# Patient Record
Sex: FEMALE | Race: WHITE | HISPANIC OR LATINO | Employment: FULL TIME | URBAN - METROPOLITAN AREA
[De-identification: names, ages, dates, MRNs, and addresses within clinical notes are randomized per-mention and may not be internally consistent; named-entity substitution may affect disease eponyms.]

---

## 2017-02-05 ENCOUNTER — APPOINTMENT (OUTPATIENT)
Dept: URGENT CARE | Age: 27
End: 2017-02-05
Payer: OTHER MISCELLANEOUS

## 2017-02-05 ENCOUNTER — TRANSCRIBE ORDERS (OUTPATIENT)
Dept: URGENT CARE | Age: 27
End: 2017-02-05

## 2017-02-05 ENCOUNTER — HOSPITAL ENCOUNTER (OUTPATIENT)
Dept: RADIOLOGY | Age: 27
Discharge: HOME | End: 2017-02-05
Attending: PHYSICIAN ASSISTANT
Payer: OTHER MISCELLANEOUS

## 2017-02-05 DIAGNOSIS — T14.90XA INJURY: ICD-10-CM

## 2017-02-05 DIAGNOSIS — T14.90XA INJURY: Primary | ICD-10-CM

## 2017-02-05 PROCEDURE — G0382 LEV 3 HOSP TYPE B ED VISIT: HCPCS

## 2017-02-05 PROCEDURE — 99283 EMERGENCY DEPT VISIT LOW MDM: CPT

## 2017-02-05 PROCEDURE — 73630 X-RAY EXAM OF FOOT: CPT

## 2017-02-08 ENCOUNTER — APPOINTMENT (OUTPATIENT)
Dept: URGENT CARE | Age: 27
End: 2017-02-08
Payer: OTHER MISCELLANEOUS

## 2017-02-08 PROCEDURE — 99213 OFFICE O/P EST LOW 20 MIN: CPT

## 2017-04-26 ENCOUNTER — APPOINTMENT (EMERGENCY)
Dept: CT IMAGING | Facility: HOSPITAL | Age: 27
End: 2017-04-26
Payer: COMMERCIAL

## 2017-04-26 ENCOUNTER — APPOINTMENT (EMERGENCY)
Dept: ULTRASOUND IMAGING | Facility: HOSPITAL | Age: 27
End: 2017-04-26
Payer: COMMERCIAL

## 2017-04-26 ENCOUNTER — HOSPITAL ENCOUNTER (EMERGENCY)
Facility: HOSPITAL | Age: 27
Discharge: HOME | End: 2017-04-26
Attending: EMERGENCY MEDICINE | Admitting: EMERGENCY MEDICINE
Payer: COMMERCIAL

## 2017-04-26 VITALS
RESPIRATION RATE: 20 BRPM | DIASTOLIC BLOOD PRESSURE: 65 MMHG | TEMPERATURE: 98.8 F | WEIGHT: 190 LBS | OXYGEN SATURATION: 99 % | HEART RATE: 77 BPM | SYSTOLIC BLOOD PRESSURE: 128 MMHG

## 2017-04-26 DIAGNOSIS — N73.0 PID (ACUTE PELVIC INFLAMMATORY DISEASE): ICD-10-CM

## 2017-04-26 DIAGNOSIS — R10.2 PELVIC PAIN: Primary | ICD-10-CM

## 2017-04-26 LAB
ALBUMIN SERPL BCP-MCNC: 3.7 G/DL (ref 3.5–5)
ALP SERPL-CCNC: 44 U/L (ref 46–116)
ALT SERPL W P-5'-P-CCNC: 16 U/L (ref 12–78)
ANION GAP SERPL CALCULATED.3IONS-SCNC: 8 MMOL/L (ref 4–13)
AST SERPL W P-5'-P-CCNC: 13 U/L (ref 5–45)
BACTERIA UR QL AUTO: ABNORMAL /HPF
BASOPHILS # BLD AUTO: 0.03 THOUSANDS/ΜL (ref 0–0.1)
BASOPHILS NFR BLD AUTO: 0 % (ref 0–1)
BILIRUB SERPL-MCNC: 0.38 MG/DL (ref 0.2–1)
BILIRUB UR QL STRIP: ABNORMAL
BUN SERPL-MCNC: 8 MG/DL (ref 5–25)
CALCIUM SERPL-MCNC: 8.5 MG/DL (ref 8.3–10.1)
CHLAMYDIA DNA CVX QL NAA+PROBE: NORMAL
CHLORIDE SERPL-SCNC: 105 MMOL/L (ref 100–108)
CLARITY UR: CLEAR
CLARITY, POC: CLEAR
CO2 SERPL-SCNC: 27 MMOL/L (ref 21–32)
COLOR UR: ABNORMAL
COLOR, POC: NORMAL
CREAT SERPL-MCNC: 0.67 MG/DL (ref 0.6–1.3)
EOSINOPHIL # BLD AUTO: 0.39 THOUSAND/ΜL (ref 0–0.61)
EOSINOPHIL NFR BLD AUTO: 4 % (ref 0–6)
ERYTHROCYTE [DISTWIDTH] IN BLOOD BY AUTOMATED COUNT: 17 % (ref 11.6–15.1)
GFR SERPL CREATININE-BSD FRML MDRD: >60 ML/MIN/1.73SQ M
GLUCOSE SERPL-MCNC: 113 MG/DL (ref 65–140)
GLUCOSE UR STRIP-MCNC: NEGATIVE MG/DL
HCG SERPL QL: NEGATIVE
HCG UR QL: NEGATIVE
HCT VFR BLD AUTO: 37.7 % (ref 34.8–46.1)
HGB BLD-MCNC: 12 G/DL (ref 11.5–15.4)
HGB UR QL STRIP.AUTO: ABNORMAL
KETONES UR STRIP-MCNC: NEGATIVE MG/DL
LACTATE SERPL-SCNC: 1.2 MMOL/L (ref 0.5–2)
LEUKOCYTE ESTERASE UR QL STRIP: NEGATIVE
LIPASE SERPL-CCNC: 170 U/L (ref 73–393)
LYMPHOCYTES # BLD AUTO: 3.67 THOUSANDS/ΜL (ref 0.6–4.47)
LYMPHOCYTES NFR BLD AUTO: 33 % (ref 14–44)
MCH RBC QN AUTO: 22.7 PG (ref 26.8–34.3)
MCHC RBC AUTO-ENTMCNC: 31.8 G/DL (ref 31.4–37.4)
MCV RBC AUTO: 71 FL (ref 82–98)
MONOCYTES # BLD AUTO: 0.77 THOUSAND/ΜL (ref 0.17–1.22)
MONOCYTES NFR BLD AUTO: 7 % (ref 4–12)
MUCOUS THREADS UR QL AUTO: ABNORMAL
N GONORRHOEA DNA GENITAL QL NAA+PROBE: NORMAL
NEUTROPHILS # BLD AUTO: 6.13 THOUSANDS/ΜL (ref 1.85–7.62)
NEUTS SEG NFR BLD AUTO: 56 % (ref 43–75)
NITRITE UR QL STRIP: NEGATIVE
NON-SQ EPI CELLS URNS QL MICRO: ABNORMAL /HPF
NRBC BLD AUTO-RTO: 0 /100 WBCS
PH UR STRIP.AUTO: 5.5 [PH] (ref 4.5–8)
PLATELET # BLD AUTO: 262 THOUSANDS/UL (ref 149–390)
PMV BLD AUTO: 11 FL (ref 8.9–12.7)
POTASSIUM SERPL-SCNC: 3.2 MMOL/L (ref 3.5–5.3)
PROT SERPL-MCNC: 7.2 G/DL (ref 6.4–8.2)
PROT UR STRIP-MCNC: ABNORMAL MG/DL
RBC # BLD AUTO: 5.29 MILLION/UL (ref 3.81–5.12)
RBC #/AREA URNS AUTO: ABNORMAL /HPF
SODIUM SERPL-SCNC: 140 MMOL/L (ref 136–145)
SP GR UR STRIP.AUTO: <=1.005 (ref 1–1.03)
UROBILINOGEN UR QL STRIP.AUTO: 0.2 E.U./DL
WBC # BLD AUTO: 10.99 THOUSAND/UL (ref 4.31–10.16)
WBC #/AREA URNS AUTO: ABNORMAL /HPF

## 2017-04-26 PROCEDURE — 87491 CHLMYD TRACH DNA AMP PROBE: CPT | Performed by: EMERGENCY MEDICINE

## 2017-04-26 PROCEDURE — 36415 COLL VENOUS BLD VENIPUNCTURE: CPT | Performed by: EMERGENCY MEDICINE

## 2017-04-26 PROCEDURE — 85025 COMPLETE CBC W/AUTO DIFF WBC: CPT | Performed by: EMERGENCY MEDICINE

## 2017-04-26 PROCEDURE — 96361 HYDRATE IV INFUSION ADD-ON: CPT

## 2017-04-26 PROCEDURE — 83690 ASSAY OF LIPASE: CPT | Performed by: EMERGENCY MEDICINE

## 2017-04-26 PROCEDURE — 83605 ASSAY OF LACTIC ACID: CPT | Performed by: EMERGENCY MEDICINE

## 2017-04-26 PROCEDURE — 87591 N.GONORRHOEAE DNA AMP PROB: CPT | Performed by: EMERGENCY MEDICINE

## 2017-04-26 PROCEDURE — 87147 CULTURE TYPE IMMUNOLOGIC: CPT

## 2017-04-26 PROCEDURE — 96375 TX/PRO/DX INJ NEW DRUG ADDON: CPT

## 2017-04-26 PROCEDURE — 80053 COMPREHEN METABOLIC PANEL: CPT | Performed by: EMERGENCY MEDICINE

## 2017-04-26 PROCEDURE — 81025 URINE PREGNANCY TEST: CPT | Performed by: EMERGENCY MEDICINE

## 2017-04-26 PROCEDURE — 81002 URINALYSIS NONAUTO W/O SCOPE: CPT | Performed by: EMERGENCY MEDICINE

## 2017-04-26 PROCEDURE — 93976 VASCULAR STUDY: CPT

## 2017-04-26 PROCEDURE — 87086 URINE CULTURE/COLONY COUNT: CPT

## 2017-04-26 PROCEDURE — 74177 CT ABD & PELVIS W/CONTRAST: CPT

## 2017-04-26 PROCEDURE — 76830 TRANSVAGINAL US NON-OB: CPT

## 2017-04-26 PROCEDURE — 96374 THER/PROPH/DIAG INJ IV PUSH: CPT

## 2017-04-26 PROCEDURE — 84703 CHORIONIC GONADOTROPIN ASSAY: CPT | Performed by: EMERGENCY MEDICINE

## 2017-04-26 PROCEDURE — 81001 URINALYSIS AUTO W/SCOPE: CPT

## 2017-04-26 PROCEDURE — 96372 THER/PROPH/DIAG INJ SC/IM: CPT

## 2017-04-26 PROCEDURE — 76856 US EXAM PELVIC COMPLETE: CPT

## 2017-04-26 PROCEDURE — 99284 EMERGENCY DEPT VISIT MOD MDM: CPT

## 2017-04-26 RX ORDER — KETOROLAC TROMETHAMINE 30 MG/ML
30 INJECTION, SOLUTION INTRAMUSCULAR; INTRAVENOUS ONCE
Status: COMPLETED | OUTPATIENT
Start: 2017-04-26 | End: 2017-04-26

## 2017-04-26 RX ORDER — DOXYCYCLINE HYCLATE 100 MG/1
100 CAPSULE ORAL ONCE
Status: COMPLETED | OUTPATIENT
Start: 2017-04-26 | End: 2017-04-26

## 2017-04-26 RX ORDER — CEFTRIAXONE SODIUM 250 MG/1
250 INJECTION, POWDER, FOR SOLUTION INTRAMUSCULAR; INTRAVENOUS ONCE
Status: COMPLETED | OUTPATIENT
Start: 2017-04-26 | End: 2017-04-26

## 2017-04-26 RX ORDER — MORPHINE SULFATE 4 MG/ML
4 INJECTION, SOLUTION INTRAMUSCULAR; INTRAVENOUS ONCE
Status: COMPLETED | OUTPATIENT
Start: 2017-04-26 | End: 2017-04-26

## 2017-04-26 RX ORDER — LIDOCAINE HYDROCHLORIDE 10 MG/ML
0.9 INJECTION, SOLUTION EPIDURAL; INFILTRATION; INTRACAUDAL; PERINEURAL ONCE
Status: COMPLETED | OUTPATIENT
Start: 2017-04-26 | End: 2017-04-26

## 2017-04-26 RX ORDER — TRAMADOL HYDROCHLORIDE 50 MG/1
50 TABLET ORAL EVERY 6 HOURS PRN
Qty: 20 TABLET | Refills: 0 | Status: SHIPPED | OUTPATIENT
Start: 2017-04-26 | End: 2017-05-01

## 2017-04-26 RX ORDER — ONDANSETRON 2 MG/ML
4 INJECTION INTRAMUSCULAR; INTRAVENOUS ONCE
Status: COMPLETED | OUTPATIENT
Start: 2017-04-26 | End: 2017-04-26

## 2017-04-26 RX ORDER — METRONIDAZOLE 500 MG/1
500 TABLET ORAL
Qty: 28 TABLET | Refills: 0 | Status: SHIPPED | OUTPATIENT
Start: 2017-04-26 | End: 2017-05-10

## 2017-04-26 RX ORDER — DOXYCYCLINE HYCLATE 100 MG/1
100 CAPSULE ORAL 2 TIMES DAILY
Qty: 28 CAPSULE | Refills: 0 | Status: SHIPPED | OUTPATIENT
Start: 2017-04-26 | End: 2017-05-10

## 2017-04-26 RX ORDER — IBUPROFEN 800 MG/1
800 TABLET ORAL EVERY 8 HOURS PRN
Qty: 30 TABLET | Refills: 0 | Status: SHIPPED | OUTPATIENT
Start: 2017-04-26 | End: 2017-10-05 | Stop reason: ALTCHOICE

## 2017-04-26 RX ORDER — METRONIDAZOLE 500 MG/1
500 TABLET ORAL ONCE
Status: COMPLETED | OUTPATIENT
Start: 2017-04-26 | End: 2017-04-26

## 2017-04-26 RX ADMIN — MORPHINE SULFATE 4 MG: 4 INJECTION, SOLUTION INTRAMUSCULAR; INTRAVENOUS at 03:38

## 2017-04-26 RX ADMIN — CEFTRIAXONE SODIUM 250 MG: 250 INJECTION, POWDER, FOR SOLUTION INTRAMUSCULAR; INTRAVENOUS at 06:55

## 2017-04-26 RX ADMIN — IOHEXOL 100 ML: 350 INJECTION, SOLUTION INTRAVENOUS at 04:29

## 2017-04-26 RX ADMIN — DOXYCYCLINE 100 MG: 100 CAPSULE ORAL at 06:55

## 2017-04-26 RX ADMIN — ONDANSETRON 4 MG: 2 INJECTION INTRAMUSCULAR; INTRAVENOUS at 03:38

## 2017-04-26 RX ADMIN — KETOROLAC TROMETHAMINE 30 MG: 30 INJECTION, SOLUTION INTRAMUSCULAR at 05:31

## 2017-04-26 RX ADMIN — LIDOCAINE HYDROCHLORIDE 0.9 ML: 10 INJECTION, SOLUTION EPIDURAL; INFILTRATION; INTRACAUDAL; PERINEURAL at 06:55

## 2017-04-26 RX ADMIN — METRONIDAZOLE 500 MG: 500 TABLET ORAL at 06:55

## 2017-04-26 RX ADMIN — SODIUM CHLORIDE 1000 ML: 0.9 INJECTION, SOLUTION INTRAVENOUS at 03:34

## 2017-04-30 LAB — BACTERIA UR CULT: NORMAL

## 2017-10-05 ENCOUNTER — HOSPITAL ENCOUNTER (EMERGENCY)
Facility: HOSPITAL | Age: 27
Discharge: HOME | End: 2017-10-05
Attending: EMERGENCY MEDICINE | Admitting: EMERGENCY MEDICINE
Payer: COMMERCIAL

## 2017-10-05 VITALS
OXYGEN SATURATION: 98 % | RESPIRATION RATE: 18 BRPM | HEART RATE: 74 BPM | DIASTOLIC BLOOD PRESSURE: 82 MMHG | TEMPERATURE: 98.1 F | WEIGHT: 194.22 LBS | SYSTOLIC BLOOD PRESSURE: 130 MMHG

## 2017-10-05 DIAGNOSIS — S76.311A RIGHT HAMSTRING MUSCLE STRAIN, INITIAL ENCOUNTER: Primary | ICD-10-CM

## 2017-10-05 PROCEDURE — 99283 EMERGENCY DEPT VISIT LOW MDM: CPT

## 2017-10-05 RX ORDER — METHOCARBAMOL 500 MG/1
1000 TABLET, FILM COATED ORAL 3 TIMES DAILY PRN
Qty: 30 TABLET | Refills: 0 | Status: SHIPPED | OUTPATIENT
Start: 2017-10-05 | End: 2018-06-27

## 2017-10-05 RX ORDER — IBUPROFEN 600 MG/1
600 TABLET ORAL EVERY 6 HOURS PRN
Qty: 30 TABLET | Refills: 0 | Status: SHIPPED | OUTPATIENT
Start: 2017-10-05 | End: 2018-06-27

## 2017-10-05 NOTE — DISCHARGE INSTRUCTIONS
Hamstring Injury   WHAT YOU NEED TO KNOW:   A hamstring injury is a bruise, strain, or tear to one of your hamstring muscles  Your hamstring muscles are in the back of your thigh and help bend and straighten your leg  DISCHARGE INSTRUCTIONS:   Medicines:   · Medicines  can help decrease pain and swelling  · Take your medicine as directed  Contact your healthcare provider if you think your medicine is not helping or if you have side effects  Tell him of her if you are allergic to any medicine  Keep a list of the medicines, vitamins, and herbs you take  Include the amounts, and when and why you take them  Bring the list or the pill bottles to follow-up visits  Carry your medicine list with you in case of an emergency  Self-care:   · Rest  your hamstring muscles as directed  · You may need to use crutches  until you can put weight on your injured leg without pain  This will help decrease stress and strain on your hamstring muscles  · Apply ice  on the back of your thigh for 15 to 20 minutes every hour or as directed  Use an ice pack, or put crushed ice in a plastic bag  Cover it with a towel  Ice helps prevent tissue damage and decreases swelling and pain  · Wear an elastic bandage  to help decrease swelling  It should be snug but not tight  · Elevate  your leg above the level of your heart as often as you can  This will help decrease swelling and pain  Prop your leg on pillows or blankets to keep it elevated comfortably  · Go to physical therapy  A physical therapist teaches you exercises to help improve movement and strength, and to decrease pain  Prevent another hamstring injury:   · Ask when you can return to your usual activities  You may injure your hamstring muscles more if you start activity too soon  · Warm up and stretch before and after you exercise  This helps loosen your muscles and decrease stress on your hamstring muscles   Slowly increase time, distance, and how often you train  A sudden increase may cause injury  Follow up with your healthcare provider as directed:  Write down your questions so you remember to ask them during your visits  Contact your healthcare provider if:   · You have a fever  · Your signs and symptoms do not improve with treatment  · You have questions or concerns about your condition or care  Return to the emergency department if:   · Your lower leg or foot is pale or blue, and feels cool when you touch it  · You have severe pain  · You cannot bend or straighten your leg  © 2017 [facility] [facility] Information is for End User's use only and may not be sold, redistributed or otherwise used for commercial purposes  All illustrations and images included in CareNotes® are the copyrighted property of A D A M , Inc  or [employer]  The above information is an  only  It is not intended as medical advice for individual conditions or treatments  Talk to your doctor, nurse or pharmacist before following any medical regimen to see if it is safe and effective for you

## 2017-10-05 NOTE — ED NOTES
Patient reports for the past 2 days has had pain in the right posterior thigh       Concepción Vega, RN  10/05/17 7916

## 2017-10-05 NOTE — ED PROVIDER NOTES
History  Chief Complaint   Patient presents with    Leg Pain     Right hamstring painful at rest and upon movement  started upon awakening two days ago  denies back or calf pain, radiation  25-year-old female presents for evaluation of 2 days of right hamstring pain  The patient states that her pain is moderate, nonradiating and worse with flexion of her right leg  The patient has some pain with ambulation  She denies any associated numbness or tingling  She denies associated rash  Denies fevers  Denies any bowel or bladder dysfunction  History provided by:  Patient  Leg Pain   Location:  Leg  Leg location:  R upper leg  Pain details:     Quality:  Aching and sharp    Radiates to:  Does not radiate    Severity:  Moderate    Onset quality:  Gradual    Duration:  2 days    Timing:  Constant    Progression:  Unchanged  Chronicity:  New  Dislocation: no    Prior injury to area:  No  Relieved by:  Nothing  Worsened by:  Flexion and bearing weight  Ineffective treatments:  None tried  Associated symptoms: no back pain, no fatigue, no fever, no muscle weakness, no numbness, no stiffness, no swelling and no tingling        None       Past Medical History:   Diagnosis Date    No known health problems        History reviewed  No pertinent surgical history  History reviewed  No pertinent family history  I have reviewed and agree with the history as documented  Social History   Substance Use Topics    Smoking status: Never Smoker    Smokeless tobacco: Never Used    Alcohol use No        Review of Systems   Constitutional: Negative for chills, fatigue and fever  HENT: Negative for sore throat  Eyes: Negative for visual disturbance  Respiratory: Negative for shortness of breath  Cardiovascular: Negative for chest pain  Gastrointestinal: Negative for abdominal pain, diarrhea, nausea and vomiting  Genitourinary: Negative for difficulty urinating, dysuria and pelvic pain  Musculoskeletal: Positive for gait problem  Negative for back pain and stiffness  Skin: Negative for rash  Neurological: Negative for syncope, weakness and headaches  All other systems reviewed and are negative  Physical Exam  ED Triage Vitals [10/05/17 1001]   Temperature Pulse Respirations Blood Pressure SpO2   98 1 °F (36 7 °C) 74 18 130/82 98 %      Temp Source Heart Rate Source Patient Position - Orthostatic VS BP Location FiO2 (%)   Oral -- -- -- --      Pain Score       6           Physical Exam   Constitutional: She is oriented to person, place, and time  She appears well-developed and well-nourished  No distress  HENT:   Head: Normocephalic and atraumatic  Right Ear: External ear normal    Left Ear: External ear normal    Eyes: Conjunctivae and EOM are normal  Pupils are equal, round, and reactive to light  No scleral icterus  Neck: Normal range of motion  Cardiovascular: Normal rate, regular rhythm and normal heart sounds  Pulmonary/Chest: Effort normal and breath sounds normal  No respiratory distress  Abdominal: Soft  Bowel sounds are normal  There is no tenderness  There is no rebound and no guarding  Musculoskeletal: Normal range of motion  She exhibits no edema  Right upper leg: She exhibits tenderness ( Belly of right hamstring)  She exhibits no bony tenderness and no swelling  Neurological: She is alert and oriented to person, place, and time  Patient with mildly antalgic gait secondary to pain  There is no foot drop or numbness   Skin: Skin is warm and dry  No rash noted  Psychiatric: She has a normal mood and affect  Nursing note and vitals reviewed        ED Medications  Medications - No data to display    Diagnostic Studies  Labs Reviewed - No data to display    No orders to display       Procedures  Procedures      Phone Contacts  ED Phone Contact    ED Course  ED Course                                MDM  Number of Diagnoses or Management Options  Right hamstring muscle strain, initial encounter: new and requires workup  Diagnosis management comments: The patient has isolated pain to the right hamstring  There is no bowel or bladder dysfunction to suggest this spinal cord involvement  There is no signs of sciatica  The patient does not have back pain or associated numbness or tingling  There is no foot drop  There is no rash to suggest cellulitis or shingles  The plan is for discharge and outpatient management  The patient will be prescribed NSAIDs and muscle relaxers  The patient (and any family present) verbalized understanding of the discharge instructions and warnings that would necessitate return to the Emergency Department  All questions were answered prior to discharge  CritCare Time    Disposition  Final diagnoses:   Right hamstring muscle strain, initial encounter     ED Disposition     ED Disposition Condition Comment    Discharge  [facility] discharge to home/self care  Condition at discharge: Stable        Follow-up Information     Follow up With Specialties Details Why [address], [place] Family Medicine In 1 week For further evaluation, if not improved [facility]  [address] [place] [address]  [address] [place] [address]          Patient's Medications   Discharge Prescriptions    IBUPROFEN (MOTRIN) 600 MG TABLET    Take 1 tablet by mouth every 6 (six) hours as needed for moderate pain       Start Date: 10/5/2017 End Date: --       Order Dose: 600 mg       Quantity: 30 tablet    Refills: 0    METHOCARBAMOL (ROBAXIN) 500 MG TABLET    Take 2 tablets by mouth 3 (three) times a day as needed for muscle spasms       Start Date: 10/5/2017 End Date: --       Order Dose: 1,000 mg       Quantity: 30 tablet    Refills: 0     No discharge procedures on file      ED Provider  Electronically Signed by       Dellar Apley, DO  10/05/17 6185

## 2018-01-10 NOTE — MISCELLANEOUS
Message  Dr Edith Cooper spoke directly with pt  K was 3 8 off K supplement  She does not need a K supplement  No need for further follow-up  Active Problems    1  Contraceptives (V25 02)   2  Eczema (692 9) (L30 9)   3  Encounter for routine gynecological examination with Papanicolaou smear of cervix   (V72 31,V76 2) (Z01 419,Z12 4)   4  Screen for STD (sexually transmitted disease) (V74 5) (Z11 3)   5  Screening for depression (V79 0) (Z13 89)    Current Meds   1  Mometasone Furoate 0 1 % External Cream; APPLY SPARINGLY TO THE AFFECTED   AREA(S) TWICE DAILY; Therapy: [id number] to (Evaluate:[id number])  Requested for: [id number]; Last   Rx:[id number] Ordered    Allergies    1  No Known Drug Allergies    2  No Known Environmental Allergies   3   No Known Food Allergies    Signatures   Electronically signed by : Georgie Espinosa, ; Feb 22 2016  2:01PM EST                       (Author)

## 2018-01-17 NOTE — MISCELLANEOUS
Provider Comments  Provider Comments:   Patient was a no show to today's appointment at 1500        Signatures   Electronically signed by : MARIBEL Iniguez; Sep 27 2016  8:33PM EST                       (Author)

## 2018-06-18 ENCOUNTER — OFFICE VISIT (OUTPATIENT)
Dept: OBGYN CLINIC | Facility: CLINIC | Age: 28
End: 2018-06-18
Payer: COMMERCIAL

## 2018-06-18 VITALS
SYSTOLIC BLOOD PRESSURE: 128 MMHG | BODY MASS INDEX: 34.31 KG/M2 | DIASTOLIC BLOOD PRESSURE: 83 MMHG | HEART RATE: 93 BPM | HEIGHT: 64 IN | WEIGHT: 201 LBS

## 2018-06-18 DIAGNOSIS — Z01.419 ENCOUNTER FOR ANNUAL ROUTINE GYNECOLOGICAL EXAMINATION: Primary | ICD-10-CM

## 2018-06-18 DIAGNOSIS — A64 STD (SEXUALLY TRANSMITTED DISEASE): ICD-10-CM

## 2018-06-18 DIAGNOSIS — Z72.51 HIGH RISK SEXUAL BEHAVIOR: ICD-10-CM

## 2018-06-18 PROCEDURE — 87591 N.GONORRHOEAE DNA AMP PROB: CPT | Performed by: NURSE PRACTITIONER

## 2018-06-18 PROCEDURE — 87491 CHLMYD TRACH DNA AMP PROBE: CPT | Performed by: NURSE PRACTITIONER

## 2018-06-18 PROCEDURE — 88175 CYTOPATH C/V AUTO FLUID REDO: CPT | Performed by: NURSE PRACTITIONER

## 2018-06-18 PROCEDURE — 99214 OFFICE O/P EST MOD 30 MIN: CPT | Performed by: NURSE PRACTITIONER

## 2018-06-18 NOTE — PROGRESS NOTES
Assessment          1  Encounter for annual routine gynecological examination  Liquid-based pap, diagnostic   2  High risk sexual behavior  Chlamydia/GC amplified DNA by PCR    HIV 1/2 Antigen/Antibody,Fourth Generation W/Rfl    RPR    Hepatitis B surface antigen   3  STD (sexually transmitted disease)  Chlamydia/GC amplified DNA by PCR    HIV 1/2 Antigen/Antibody,Fourth Generation W/Rfl    RPR    Hepatitis B surface antigen        Plan      All questions answered  Breast self exam technique reviewed and patient encouraged to perform self-exam monthly  Chlamydia specimen  Contraception: none  Discussed healthy lifestyle modifications  Follow up in 1 years  GC specimen  HIV, RPR, Hep B   Complete STD testing as directed, results can take up to 2 weeks  PAP results will be available in 2 week  Folic acid 726 mcg daily while trying to become pregnant  Call with needs or concerns  Return in 1 year  Pt verbalized understanding of all discussed  Nicki Meek is a 28 y o  female who presents for annual exam  Periods are regular every 28-30 days, lasting 3 days  Dysmenorrhea:mild, occurring premenstrually  Cyclic symptoms include none  No intermenstrual bleeding, spotting, or discharge  The patient reports that there is not domestic violence in her life  1 Partner x 4 years  Requested STD testing just in case, pt is asymptomatic  Desires pregnancy, discussed healthy life style and folic acid intake  Current contraception: none  History of abnormal Pap smear: no  Family history of uterine or ovarian cancer: no  Regular self breast exam: yes  History of abnormal mammogram: N/A  Family history of breast cancer: no  History of abnormal lipids: unknown  Menstrual History:  OB History      Para Term  AB Living    6 3 3   2 3    SAB TAB Ectopic Multiple Live Births    1       1         Menarche age: 5  Patient's last menstrual period was 2018 (approximate)    Period Duration (Days): 3-4  Period Pattern: Regular  Menstrual Flow: Heavy, Light (painful)  Menstrual Control: Tampon, Maxi pad    The following portions of the patient's history were reviewed and updated as appropriate: allergies, current medications, past family history, past medical history, past social history, past surgical history and problem list     Review of Systems  Pertinent items are noted in HPI        Objective      /83   Pulse 93   Ht 5' 4" (1 626 m)   Wt 91 2 kg (201 lb)   LMP 05/18/2018 (Approximate)   BMI 34 50 kg/m²     General:   alert and oriented, in no acute distress, alert, appears stated age and cooperative   Heart: regular rate and rhythm, S1, S2 normal, no murmur, click, rub or gallop   Lungs: clear to auscultation bilaterally   Thyroid negative masses   Abdomen: soft, non-tender, without masses or organomegaly   Vulva: Normal, negative lesions or erythema   Vagina: normal mucosa   Cervix: no bleeding following Pap, no cervical motion tenderness and no lesions   Uterus: normal size, normal shape and consistency   Adnexa: normal adnexa   Breasts NT, negative masses, discharge or dimpling

## 2018-06-18 NOTE — PATIENT INSTRUCTIONS
Complete STD testing as directed, results can take up to 2 weeks  PAP results will be available in 2 week  Folic acid 993 mcg daily while trying to become pregnant  Call with needs or concerns  Return in 1 year

## 2018-06-20 LAB
CHLAMYDIA DNA CVX QL NAA+PROBE: NORMAL
N GONORRHOEA DNA GENITAL QL NAA+PROBE: NORMAL

## 2018-06-21 LAB
LAB AP GYN PRIMARY INTERPRETATION: NORMAL
Lab: NORMAL

## 2018-06-27 ENCOUNTER — HOSPITAL ENCOUNTER (EMERGENCY)
Facility: HOSPITAL | Age: 28
Discharge: HOME | End: 2018-06-27
Attending: EMERGENCY MEDICINE | Admitting: EMERGENCY MEDICINE
Payer: COMMERCIAL

## 2018-06-27 ENCOUNTER — APPOINTMENT (EMERGENCY)
Dept: RADIOLOGY | Facility: HOSPITAL | Age: 28
End: 2018-06-27
Payer: COMMERCIAL

## 2018-06-27 VITALS
SYSTOLIC BLOOD PRESSURE: 130 MMHG | HEIGHT: 64 IN | HEART RATE: 95 BPM | DIASTOLIC BLOOD PRESSURE: 80 MMHG | RESPIRATION RATE: 16 BRPM | WEIGHT: 203 LBS | TEMPERATURE: 98.5 F | OXYGEN SATURATION: 100 % | BODY MASS INDEX: 34.66 KG/M2

## 2018-06-27 DIAGNOSIS — M79.674 PAIN OF TOE OF RIGHT FOOT: Primary | ICD-10-CM

## 2018-06-27 PROCEDURE — 99283 EMERGENCY DEPT VISIT LOW MDM: CPT

## 2018-06-27 PROCEDURE — 73660 X-RAY EXAM OF TOE(S): CPT

## 2018-06-27 RX ORDER — ACETAMINOPHEN 325 MG/1
650 TABLET ORAL EVERY 6 HOURS PRN
Qty: 30 TABLET | Refills: 0 | Status: SHIPPED | OUTPATIENT
Start: 2018-06-27 | End: 2020-04-19

## 2018-06-27 RX ORDER — NAPROXEN 375 MG/1
375 TABLET ORAL 2 TIMES DAILY WITH MEALS
Qty: 20 TABLET | Refills: 0 | Status: SHIPPED | OUTPATIENT
Start: 2018-06-27 | End: 2019-06-19 | Stop reason: ALTCHOICE

## 2018-06-27 NOTE — DISCHARGE INSTRUCTIONS
Arthralgia   WHAT YOU NEED TO KNOW:   Arthralgia is pain in one or more joints, with no inflammation  It may be short-term and get better within 6 to 8 weeks  Arthralgia can be an early sign of arthritis  Arthralgia may be caused by a medical condition, such as a hormone disorder or a tumor  It may also be caused by an infection or injury  DISCHARGE INSTRUCTIONS:   Medicines: The following medicines may  be ordered for you:  · Acetaminophen  decreases pain  Ask how much to take and how often to take it  Follow directions  Acetaminophen can cause liver damage if not taken correctly  · NSAIDs  decrease pain and prevent swelling  Ask your healthcare provider which medicine is right for you  Ask how much to take and when to take it  Take as directed  NSAIDs can cause stomach bleeding and kidney problems if not taken correctly  · Pain relief cream  decreases pain  Use this cream as directed  · Take your medicine as directed  Contact your healthcare provider if you think your medicine is not helping or if you have side effects  Tell him of her if you are allergic to any medicine  Keep a list of the medicines, vitamins, and herbs you take  Include the amounts, and when and why you take them  Bring the list or the pill bottles to follow-up visits  Carry your medicine list with you in case of an emergency  Follow up with your healthcare provider or specialist as directed:  Write down your questions so you remember to ask them during your visits  Self-care:   · Apply heat  to help decrease pain  Use a heating pad or heat wrap  Apply heat for 20 to 30 minutes every 2 hours for as many days as directed  · Rest  as much as possible  Avoid activities that cause joint pain  · Apply ice  to help decrease swelling and pain  Ice may also help prevent tissue damage  Use an ice pack, or put crushed ice in a plastic bag   Cover it with a towel and place it on your painful joint for 15 to 20 minutes every hour or as directed  · Support  the joint with a brace or elastic wrap as directed  · Elevate  your joint above the level of your heart as often as you can to help decrease swelling and pain  Prop your painful joint on pillows or blankets to keep it elevated comfortably  · Lose weight  if you are overweight  Extra weight can put pressure on your joints and cause more pain  Ask your healthcare provider how much you should weigh  Ask him to help you create a weight loss plan  · Exercise  regularly to help improve joint movement and to decrease pain  Ask about the best exercise plan for you  Low-impact exercises can help take the pressure off your joints  Examples are walking, swimming, and water aerobics  Physical therapy:  A physical therapist teaches you exercises to help improve movement and strength, and to decrease pain  Ask your healthcare provider if physical therapy is right for you  Contact your healthcare provider or specialist if:   · You have a fever  · You continue to have joint pain that cannot be relieved with heat, ice, or medicine  · You have pain and inflammation around your joint  · You have questions or concerns about your condition or care  Return to the emergency department if:   · You have sudden, severe pain when you move your joint  · You have a fever and shaking chills  · You cannot move your joint  · You lose feeling on the side of your body where you have the painful joint  © 2017 [address] [employer]  Information is for End User's use only and may not be sold, redistributed or otherwise used for commercial purposes  All illustrations and images included in CareNotes® are the copyrighted property of A D A M , Inc  or [employer]  The above information is an  only  It is not intended as medical advice for individual conditions or treatments   Talk to your doctor, nurse or pharmacist before following any medical regimen to see if it is safe and effective for you

## 2018-06-27 NOTE — ED PROVIDER NOTES
Final Diagnosis:  1  Pain of toe of right foot      Chief Complaint   Patient presents with    Toe Pain     Pt reports pain to 2nd and 3rd toes of right foot, reports some swelling  No redness or injury  This is a 28year old female presenting for base of 2nd toe pain  The patient works as a CRNA  On Monday after work she remembers that after she was getting to her car she started knows severe pain at the base of the 2nd toe on the right foot  She has never had similar pain in the past apart from when she had sprains various joints  She did not notice any redness  She does not remember any specific injury  She has no new shoes  She wear sneakers to work which is the same 1 she has been aware for quite a while  She does not think that they are very tight fitting  Denies fevers chills nausea vomiting  She took Motrin for pain with minimal improvement in symptoms  Because of the continued pain especially with walking and bearing weight she has come in for evaluation  Every time she bends the toes she has severe pain  If she has no weight on it she has minimal pain  No history of gout  No history of pseudogout  PMH:  - none  PSH:  - none  No smoking, drinking, drugs  PE:   Vitals:    06/27/18 0954   BP: 130/80   Pulse: 95   Resp: 16   Temp: 98 5 °F (36 9 °C)   TempSrc: Temporal   SpO2: 100%   Weight: 92 1 kg (203 lb)   Height: 5' 4" (1 626 m)   General: VS reviewed  Appears in NAD  awake, alert  Well-nourished, well-developed  Appears stated age  Speaking normally in full sentences  Head: Normocephalic, atraumatic, nontender  Eyes: EOM-I  No diplopia  No hyphema  No subconjunctival hemorrhages  Symmetrical lids  ENT: Atraumatic external nose and ears  MMM  No malocclusion  No stridor  Normal phonation  No drooling  Normal swallowing  Neck: No JVD  CV: No pallor noted  Peripheral pulses +2 throughout  No chest wall tenderness     Lungs:   No tachypnea  No respiratory distress  MSK: FROM   RIGHT:  EHL/FHL/PF/DF/KF/KE/HF/HE 5/5  No saddle anesthesia  2+ patellar reflexes  No tenderness of the 5th metatarsal, no tenderness of fibular head, no cog-sensation with rotation along joint of LisFranc  Base of the second toe: has moderate tenderness with palpation  When I lightly stroke it, there is no pain  There is no redness  Skin: Dry, intact  Neuro: Awake, alert, GCS15, CN II-XII grossly intact  Motor grossly intact  Psychiatric/Behavioral: Appropriate mood and affect   Exam: deferred  A:  - 2nd right toe pain  P:  - XR for fx  - NSAIDs/Tylenol  - Surgical boot  - f/u podiatry  - 13 point ROS was performed and all are normal unless stated in the history above  - Nursing note reviewed  Vitals reviewed  - Orders placed by myself and/or advanced practitioner / resident     - Previous chart was not reviewed  - No language barrier    - History obtained from patient  - There are no limitations to the history obtained  - Critical care time: Not applicable for this patient  ED Course as of Jun 27 1115   Wed Jun 27, 2018   1114 I reviewed all testing with the patient: XR read  I gave oral return precautions for what to return for in addition to the written return precautions  The patient verbalized understanding of the discharge instructions and warnings that would necessitate return to the Emergency Department  I specifically highlighted areas of special concern regarding the written and verbal discharge instructions and return precautions  All questions were answered prior to discharge  Medications - No data to display  XR toe second min 2 views RIGHT   ED Interpretation   The TOE was ordered by me and interpreted by me independently  On my read, it appears:   - no obvious fx      Final Result      No acute osseous abnormality              Workstation performed: [id number]           Orders Placed This Encounter   Procedures    XR toe second min 2 views RIGHT    Apply surgical shoe to affected extremity     Labs Reviewed - No data to display  Time reflects when diagnosis was documented in both MDM as applicable and the Disposition within this note     Time User Action Codes Description Comment    6/27/2018 10:05 AM Timleta Mcbride Add [C48 824] Pain of toe of right foot       ED Disposition     ED Disposition Condition Comment    Discharge  [address] [facility] discharge to home/self care  Condition at discharge: Good        Follow-up Information     Follow up With Specialties Details Why Contact Info Additional [facility] Emergency Department Emergency Medicine Go to If symptoms worsen [address] [facility]  [phone] [place] ED, [address] [place], [place], [address], [facility] Podiatry Call in 1 day To make appointment for re-evaluation in 1 week [address] [place] [address]  [phone]           Patient's Medications   Discharge Prescriptions    ACETAMINOPHEN (TYLENOL) 325 MG TABLET    Take 2 tablets (650 mg total) by mouth every 6 (six) hours as needed for mild pain or fever       Start Date: 6/27/2018 End Date: --       Order Dose: 650 mg       Quantity: 30 tablet    Refills: 0    NAPROXEN (NAPROSYN) 375 MG TABLET    Take 1 tablet (375 mg total) by mouth 2 (two) times a day with meals       Start Date: 6/27/2018 End Date: --       Order Dose: 375 mg       Quantity: 20 tablet    Refills: 0     No discharge procedures on file  None       Portions of the record may have been created with voice recognition software  Occasional wrong word or "sound a like" substitutions may have occurred due to the inherent limitations of voice recognition software  Read the chart carefully and recognize, using context, where substitutions have occurred      Electronically signed by:  Charito Yi MD  06/27/18 9963

## 2019-05-17 ENCOUNTER — APPOINTMENT (OUTPATIENT)
Dept: RADIOLOGY | Age: 29
End: 2019-05-17
Attending: PREVENTIVE MEDICINE
Payer: OTHER MISCELLANEOUS

## 2019-05-17 ENCOUNTER — APPOINTMENT (OUTPATIENT)
Dept: URGENT CARE | Age: 29
End: 2019-05-17
Payer: OTHER MISCELLANEOUS

## 2019-05-17 DIAGNOSIS — M25.512 ACUTE PAIN OF LEFT SHOULDER: Primary | ICD-10-CM

## 2019-05-17 DIAGNOSIS — M25.512 ACUTE PAIN OF LEFT SHOULDER: ICD-10-CM

## 2019-05-17 PROCEDURE — 73030 X-RAY EXAM OF SHOULDER: CPT

## 2019-05-17 PROCEDURE — 99283 EMERGENCY DEPT VISIT LOW MDM: CPT | Performed by: PREVENTIVE MEDICINE

## 2019-05-17 PROCEDURE — G0382 LEV 3 HOSP TYPE B ED VISIT: HCPCS | Performed by: PREVENTIVE MEDICINE

## 2019-05-20 ENCOUNTER — APPOINTMENT (OUTPATIENT)
Dept: URGENT CARE | Age: 29
End: 2019-05-20
Payer: OTHER MISCELLANEOUS

## 2019-05-20 PROCEDURE — 99214 OFFICE O/P EST MOD 30 MIN: CPT | Performed by: PREVENTIVE MEDICINE

## 2019-06-19 ENCOUNTER — ANNUAL EXAM (OUTPATIENT)
Dept: OBGYN CLINIC | Facility: CLINIC | Age: 29
End: 2019-06-19

## 2019-06-19 VITALS
HEART RATE: 84 BPM | DIASTOLIC BLOOD PRESSURE: 81 MMHG | SYSTOLIC BLOOD PRESSURE: 121 MMHG | HEIGHT: 64 IN | WEIGHT: 193.8 LBS | BODY MASS INDEX: 33.09 KG/M2

## 2019-06-19 DIAGNOSIS — Z01.419 ENCOUNTER FOR ANNUAL ROUTINE GYNECOLOGICAL EXAMINATION: Primary | ICD-10-CM

## 2019-06-19 PROCEDURE — 3725F SCREEN DEPRESSION PERFORMED: CPT | Performed by: NURSE PRACTITIONER

## 2019-06-19 PROCEDURE — 99395 PREV VISIT EST AGE 18-39: CPT | Performed by: NURSE PRACTITIONER

## 2020-02-04 ENCOUNTER — HOSPITAL ENCOUNTER (EMERGENCY)
Facility: HOSPITAL | Age: 30
Discharge: HOME | End: 2020-02-04
Attending: EMERGENCY MEDICINE | Admitting: EMERGENCY MEDICINE
Payer: COMMERCIAL

## 2020-02-04 VITALS
TEMPERATURE: 100.3 F | HEART RATE: 114 BPM | RESPIRATION RATE: 19 BRPM | SYSTOLIC BLOOD PRESSURE: 156 MMHG | OXYGEN SATURATION: 99 % | DIASTOLIC BLOOD PRESSURE: 94 MMHG

## 2020-02-04 DIAGNOSIS — R68.89 FLU-LIKE SYMPTOMS: Primary | ICD-10-CM

## 2020-02-04 DIAGNOSIS — R19.7 DIARRHEA: ICD-10-CM

## 2020-02-04 LAB
AMORPH URATE CRY URNS QL MICRO: ABNORMAL /HPF
BACTERIA UR QL AUTO: ABNORMAL /HPF
BILIRUB UR QL STRIP: NEGATIVE
CLARITY UR: CLEAR
COLOR UR: YELLOW
EXT PREG TEST URINE: NEGATIVE
EXT. CONTROL ED NAV: NORMAL
FLUAV RNA NPH QL NAA+PROBE: NORMAL
FLUBV RNA NPH QL NAA+PROBE: NORMAL
GLUCOSE UR STRIP-MCNC: NEGATIVE MG/DL
HGB UR QL STRIP.AUTO: ABNORMAL
KETONES UR STRIP-MCNC: ABNORMAL MG/DL
LEUKOCYTE ESTERASE UR QL STRIP: NEGATIVE
NITRITE UR QL STRIP: NEGATIVE
NON-SQ EPI CELLS URNS QL MICRO: ABNORMAL /HPF
PH UR STRIP.AUTO: 6.5 [PH] (ref 4.5–8)
PROT UR STRIP-MCNC: ABNORMAL MG/DL
RBC #/AREA URNS AUTO: ABNORMAL /HPF
RSV RNA NPH QL NAA+PROBE: NORMAL
SP GR UR STRIP.AUTO: 1.02 (ref 1–1.03)
UROBILINOGEN UR QL STRIP.AUTO: 1 E.U./DL
WBC #/AREA URNS AUTO: ABNORMAL /HPF

## 2020-02-04 PROCEDURE — 99284 EMERGENCY DEPT VISIT MOD MDM: CPT | Performed by: PHYSICIAN ASSISTANT

## 2020-02-04 PROCEDURE — 87631 RESP VIRUS 3-5 TARGETS: CPT | Performed by: PHYSICIAN ASSISTANT

## 2020-02-04 PROCEDURE — 99283 EMERGENCY DEPT VISIT LOW MDM: CPT

## 2020-02-04 PROCEDURE — 81001 URINALYSIS AUTO W/SCOPE: CPT

## 2020-02-04 PROCEDURE — 81025 URINE PREGNANCY TEST: CPT | Performed by: PHYSICIAN ASSISTANT

## 2020-02-04 RX ORDER — DICYCLOMINE HCL 20 MG
20 TABLET ORAL 2 TIMES DAILY
Qty: 20 TABLET | Refills: 0 | Status: SHIPPED | OUTPATIENT
Start: 2020-02-04 | End: 2020-04-19

## 2020-02-04 RX ORDER — ONDANSETRON 4 MG/1
4 TABLET, ORALLY DISINTEGRATING ORAL EVERY 6 HOURS PRN
Qty: 20 TABLET | Refills: 0 | Status: SHIPPED | OUTPATIENT
Start: 2020-02-04 | End: 2020-04-19

## 2020-02-04 RX ORDER — FLUTICASONE PROPIONATE 50 MCG
1 SPRAY, SUSPENSION (ML) NASAL DAILY
Qty: 16 G | Refills: 0 | Status: SHIPPED | OUTPATIENT
Start: 2020-02-04 | End: 2020-04-19

## 2020-02-04 RX ORDER — ACETAMINOPHEN 325 MG/1
650 TABLET ORAL ONCE
Status: COMPLETED | OUTPATIENT
Start: 2020-02-04 | End: 2020-02-04

## 2020-02-04 RX ADMIN — ACETAMINOPHEN 650 MG: 325 TABLET ORAL at 13:15

## 2020-02-04 NOTE — ED PROVIDER NOTES
History  Chief Complaint   Patient presents with    Flu Symptoms     pt c/o subjective fever, body aches, congestion  daughter dx with flu  Patient is a 26-year-old female with no significant past medical history who presents with fever, body aches, congestion and cough for 1 day  Patient states that today she started with diffuse body aches, subjective fever, congestion, dry, nonproductive cough  She denies any stridor, wheezing, shortness of breath, leg pain or swelling, recent travel, chest pain, palpitations  She also notes sore throat, worse with coughing, and better with cold drinks  She denies any stridor, drooling, voice change, throat swelling, neck pain or stiffness  Patient notes intermittent nausea, and 1 episode of nonbloody diarrhea this morning, but denies any vomiting, abdominal pain, urinary changes, recent travel, recent new foods or medications, recent antibiotics  Patient has been able to eat drink without issue  Patient also notes intermittent gradual onset aching headache, but denies any vision changes, neck pain or stiffness, dizziness, lightheadedness, numbness, tingling, weakness  Patient has not tried anything to help alleviate her symptoms  Patient states that her daughter was just diagnosed with flu and has similar symptoms  Patient denies any chills, diaphoresis, ear pain, or rash  Patient did not get the flu shot this year  Prior to Admission Medications   Prescriptions Last Dose Informant Patient Reported?  Taking?   acetaminophen (TYLENOL) 325 mg tablet   No No   Sig: Take 2 tablets (650 mg total) by mouth every 6 (six) hours as needed for mild pain or fever      Facility-Administered Medications: None       Past Medical History:   Diagnosis Date    No known health problems        Past Surgical History:   Procedure Laterality Date    NO PAST SURGERIES         Family History   Problem Relation Age of Onset    Cancer Mother 48        brain cancer     I have reviewed and agree with the history as documented  Social History     Tobacco Use    Smoking status: Never Smoker    Smokeless tobacco: Never Used   Substance Use Topics    Alcohol use: No    Drug use: No        Review of Systems   Constitutional: Positive for fever  Negative for chills and diaphoresis  HENT: Positive for congestion and sore throat  Negative for drooling, ear pain, rhinorrhea, trouble swallowing and voice change  Eyes: Negative for visual disturbance  Respiratory: Positive for cough  Negative for shortness of breath, wheezing and stridor  Cardiovascular: Negative for chest pain, palpitations and leg swelling  Gastrointestinal: Positive for nausea  Negative for abdominal pain, diarrhea and vomiting  Genitourinary: Negative for difficulty urinating, dysuria, frequency, hematuria and urgency  Musculoskeletal: Positive for myalgias  Negative for neck pain and neck stiffness  Skin: Negative for color change, pallor and rash  Neurological: Positive for headaches  Negative for dizziness, weakness, light-headedness and numbness  All other systems reviewed and are negative  Physical Exam  Physical Exam   Constitutional: She is oriented to person, place, and time  She appears well-developed and well-nourished  She is active and cooperative  Non-toxic appearance  She does not have a sickly appearance  She does not appear ill  No distress  Patient appears ill, no acute distress, nontoxic-appearing  HENT:   Head: Normocephalic and atraumatic  Right Ear: Hearing, tympanic membrane, external ear and ear canal normal    Left Ear: Hearing, tympanic membrane, external ear and ear canal normal    Nose: Nose normal    Mouth/Throat: Uvula is midline, oropharynx is clear and moist and mucous membranes are normal  No oropharyngeal exudate  Eyes: Pupils are equal, round, and reactive to light  Conjunctivae and EOM are normal    Neck: Normal range of motion  Neck supple  Cardiovascular: Normal rate, regular rhythm, S1 normal, S2 normal, normal heart sounds, intact distal pulses and normal pulses  Pulmonary/Chest: Effort normal and breath sounds normal  No stridor  No respiratory distress  She has no decreased breath sounds  She has no wheezes  She has no rhonchi  She has no rales  Abdominal: Soft  Normal appearance and bowel sounds are normal  She exhibits no distension  There is no tenderness  Musculoskeletal: Normal range of motion  Lymphadenopathy:     She has no cervical adenopathy  Neurological: She is alert and oriented to person, place, and time  She has normal strength  No cranial nerve deficit or sensory deficit  Coordination normal  GCS eye subscore is 4  GCS verbal subscore is 5  GCS motor subscore is 6  Skin: Skin is warm and dry  Capillary refill takes less than 2 seconds  She is not diaphoretic  Nursing note and vitals reviewed        Vital Signs  ED Triage Vitals [02/04/20 1211]   Temperature Pulse Respirations Blood Pressure SpO2   100 3 °F (37 9 °C) (!) 114 19 156/94 99 %      Temp Source Heart Rate Source Patient Position - Orthostatic VS BP Location FiO2 (%)   Oral Monitor Sitting Right arm --      Pain Score       --           Vitals:    02/04/20 1211   BP: 156/94   Pulse: (!) 114   Patient Position - Orthostatic VS: Sitting         Visual Acuity      ED Medications  Medications   acetaminophen (TYLENOL) tablet 650 mg (650 mg Oral Given 2/4/20 1315)       Diagnostic Studies  Results Reviewed     Procedure Component Value Units Date/Time    Urine Microscopic [[id number]]  (Abnormal) Collected:  02/04/20 1325    Lab Status:  Final result Specimen:  Urine, Clean Catch Updated:  02/04/20 1504     RBC, UA 2-4 /hpf      WBC, UA None Seen /hpf      Epithelial Cells Occasional /hpf      Bacteria, UA Occasional /hpf      AMORPH URATES Occasional /hpf     Influenza A/B and RSV PCR [[id number]]  (Normal) Collected:  02/04/20 1315    Lab Status:  Edited Result - FINAL Specimen:  Nasopharyngeal Swab Updated:  02/04/20 1417     INFLUENZA A PCR None Detected     INFLUENZA B PCR None Detected     RSV PCR None Detected    Narrative:       Called corrected result to [facility]    POCT urinalysis dipstick [[id number]]  (Abnormal) Resulted:  02/04/20 1328    Lab Status:  Final result Specimen:  Urine Updated:  02/04/20 1328    POCT pregnancy, urine [[id number]]  (Normal) Resulted:  02/04/20 1327    Lab Status:  Final result Updated:  02/04/20 1328     EXT PREG TEST UR (Ref: Negative) negative     Control valid    Urine Macroscopic, POC [[id number]]  (Abnormal) Collected:  02/04/20 1325    Lab Status:  Final result Specimen:  Urine Updated:  02/04/20 1326     Color, UA Yellow     Clarity, UA Clear     pH, UA 6 5     Leukocytes, UA Negative     Nitrite, UA Negative     Protein,  (2+) mg/dl      Glucose, UA Negative mg/dl      Ketones, UA Trace mg/dl      Urobilinogen, UA 1 0 E U /dl      Bilirubin, UA Negative     Blood, UA Trace     Specific [place], UA 1 025    Narrative:       CLINITEK RESULT                 No orders to display              Procedures  Procedures         ED Course  ED Course as of Feb 05 2104   Judy Estrada Feb 04, 2020   1335 Patient is just finishing her period   Blood, UA(!): Trace   1414 Informed by lab this is incorrect  Patient is not positive  INFLU B PCR(!): Detected   1441 Patient notes resolution of headache  Patient is tolerating p o  Without issue  Patient is stable for discharge  MDM  Number of Diagnoses or Management Options  Diarrhea:   Flu-like symptoms:   Diagnosis management comments: Discussed likely viral etiology of patient's symptoms and reviewed treatment at home  Provided with Flonase, Zofran, Bentyl, reviewed medication education, encouraged hydration and reviewed BRAT diet  Recommended follow-up with PCP in 5-7 days for persistent symptoms  Reviewed red flags symptoms and strict return to ED instructions  Patient notes understanding and agrees to plan  Disposition  Final diagnoses:   Flu-like symptoms   Diarrhea     Time reflects when diagnosis was documented in both MDM as applicable and the Disposition within this note     Time User Action Codes Description Comment    2/4/2020  2:40 PM Marco Antonio Sandoval Add [R68 89] Flu-like symptoms     2/4/2020  2:41 PM Chong Villanueva, [address] [R19 7] Diarrhea       ED Disposition     ED Disposition Condition Date/Time Comment    Discharge Stable Tue Feb 4, 2020  2:40 PM Binnie Child discharge to home/self care              Follow-up Information     Follow up With Specialties Details Why [address] [facility] Emergency Department Emergency Medicine  If symptoms worsen [facility] [id number] [phone] [place] ED, [address] [facility] , [facility], [address] [facility], [address]    Follow-up with PCP in 5-7 days for persistent symptoms         [address] [facility]   [address] [facility] [address], [address] [facility] [id number]  [address], [address] [facility] [address], [address] [facility], [address] [place] [address], [address]          Discharge Medication List as of 2/4/2020  2:44 PM      START taking these medications    Details   dicyclomine (BENTYL) 20 mg tablet Take 1 tablet (20 mg total) by mouth 2 (two) times a day, Starting Tue 2/4/2020, Print      fluticasone (FLONASE) 50 mcg/act nasal spray 1 spray into each nostril daily, Starting Tue 2/4/2020, Print      ondansetron (ZOFRAN-ODT) 4 mg disintegrating tablet Take 1 tablet (4 mg total) by mouth every 6 (six) hours as needed for nausea or vomiting, Starting Tue 2/4/2020, Print         CONTINUE these medications which have NOT CHANGED    Details   acetaminophen (TYLENOL) 325 mg tablet Take 2 tablets (650 mg total) by mouth every 6 (six) hours as needed for mild pain or fever, Starting Wed 6/27/2018, Print No discharge procedures on file      ED Provider  Electronically Signed by           Yovany Hernandez PA-C  02/05/20 9930

## 2020-02-04 NOTE — DISCHARGE INSTRUCTIONS
Start using nasal spray daily for congestion  Take Zofran as prescribed as needed for nausea and vomiting  Take Bentyl as prescribed as needed for abdominal cramping and diarrhea  Take Motrin or Tylenol for pain and fevers  Salt water gargles, hot tea, cold drinks, throat lozenges or sprays may help alleviate sore throat  Rest and drink plenty of fluids  Slow introduction of foods like broth, bread, rice, and other bland foods  Follow-up with PCP in 5 days for persistent symptoms  Return to ED if symptoms worsen including inability tolerate food or fluid, worsening sore throat, stridor, difficulty breathing, fevers that do not get better with medication, blood in stool or vomit

## 2020-04-19 ENCOUNTER — APPOINTMENT (EMERGENCY)
Dept: ULTRASOUND IMAGING | Facility: HOSPITAL | Age: 30
End: 2020-04-19
Payer: COMMERCIAL

## 2020-04-19 ENCOUNTER — APPOINTMENT (EMERGENCY)
Dept: CT IMAGING | Facility: HOSPITAL | Age: 30
End: 2020-04-19
Payer: COMMERCIAL

## 2020-04-19 ENCOUNTER — HOSPITAL ENCOUNTER (EMERGENCY)
Facility: HOSPITAL | Age: 30
Discharge: HOME | End: 2020-04-19
Attending: EMERGENCY MEDICINE | Admitting: EMERGENCY MEDICINE
Payer: COMMERCIAL

## 2020-04-19 VITALS
OXYGEN SATURATION: 100 % | SYSTOLIC BLOOD PRESSURE: 124 MMHG | TEMPERATURE: 99.2 F | RESPIRATION RATE: 14 BRPM | DIASTOLIC BLOOD PRESSURE: 75 MMHG | HEART RATE: 102 BPM

## 2020-04-19 DIAGNOSIS — N73.9 PELVIC INFLAMMATORY DISEASE (PID): Primary | ICD-10-CM

## 2020-04-19 LAB
ABO GROUP BLD: NORMAL
ALBUMIN SERPL BCP-MCNC: 3.7 G/DL (ref 3.5–5)
ALP SERPL-CCNC: 52 U/L (ref 46–116)
ALT SERPL W P-5'-P-CCNC: 18 U/L (ref 12–78)
ANION GAP SERPL CALCULATED.3IONS-SCNC: 10 MMOL/L (ref 4–13)
AST SERPL W P-5'-P-CCNC: 14 U/L (ref 5–45)
B-HCG SERPL-ACNC: <2 MIU/ML
BACTERIA UR QL AUTO: ABNORMAL /HPF
BASOPHILS # BLD AUTO: 0.05 THOUSANDS/ΜL (ref 0–0.1)
BASOPHILS NFR BLD AUTO: 0 % (ref 0–1)
BILIRUB SERPL-MCNC: 1.07 MG/DL (ref 0.2–1)
BILIRUB UR QL STRIP: NEGATIVE
BLD GP AB SCN SERPL QL: NEGATIVE
BUN SERPL-MCNC: 7 MG/DL (ref 5–25)
CALCIUM SERPL-MCNC: 8.7 MG/DL (ref 8.3–10.1)
CHLORIDE SERPL-SCNC: 100 MMOL/L (ref 100–108)
CLARITY UR: ABNORMAL
CO2 SERPL-SCNC: 26 MMOL/L (ref 21–32)
COLOR UR: ABNORMAL
CREAT SERPL-MCNC: 0.76 MG/DL (ref 0.6–1.3)
EOSINOPHIL # BLD AUTO: 0.01 THOUSAND/ΜL (ref 0–0.61)
EOSINOPHIL NFR BLD AUTO: 0 % (ref 0–6)
ERYTHROCYTE [DISTWIDTH] IN BLOOD BY AUTOMATED COUNT: 17.3 % (ref 11.6–15.1)
GFR SERPL CREATININE-BSD FRML MDRD: 106 ML/MIN/1.73SQ M
GLUCOSE SERPL-MCNC: 108 MG/DL (ref 65–140)
GLUCOSE UR STRIP-MCNC: NEGATIVE MG/DL
HCT VFR BLD AUTO: 37.3 % (ref 34.8–46.1)
HGB BLD-MCNC: 10.7 G/DL (ref 11.5–15.4)
HGB UR QL STRIP.AUTO: ABNORMAL
IMM GRANULOCYTES # BLD AUTO: 0.08 THOUSAND/UL (ref 0–0.2)
IMM GRANULOCYTES NFR BLD AUTO: 0 % (ref 0–2)
KETONES UR STRIP-MCNC: ABNORMAL MG/DL
LEUKOCYTE ESTERASE UR QL STRIP: ABNORMAL
LIPASE SERPL-CCNC: 118 U/L (ref 73–393)
LYMPHOCYTES # BLD AUTO: 1.11 THOUSANDS/ΜL (ref 0.6–4.47)
LYMPHOCYTES NFR BLD AUTO: 6 % (ref 14–44)
MCH RBC QN AUTO: 19.7 PG (ref 26.8–34.3)
MCHC RBC AUTO-ENTMCNC: 28.7 G/DL (ref 31.4–37.4)
MCV RBC AUTO: 69 FL (ref 82–98)
MONOCYTES # BLD AUTO: 0.72 THOUSAND/ΜL (ref 0.17–1.22)
MONOCYTES NFR BLD AUTO: 4 % (ref 4–12)
NEUTROPHILS # BLD AUTO: 16.98 THOUSANDS/ΜL (ref 1.85–7.62)
NEUTS SEG NFR BLD AUTO: 90 % (ref 43–75)
NITRITE UR QL STRIP: POSITIVE
NON-SQ EPI CELLS URNS QL MICRO: ABNORMAL /HPF
NRBC BLD AUTO-RTO: 0 /100 WBCS
PH UR STRIP.AUTO: 7.5 [PH]
PLATELET # BLD AUTO: 317 THOUSANDS/UL (ref 149–390)
PMV BLD AUTO: 11 FL (ref 8.9–12.7)
POTASSIUM SERPL-SCNC: 3.6 MMOL/L (ref 3.5–5.3)
PROT SERPL-MCNC: 8.1 G/DL (ref 6.4–8.2)
PROT UR STRIP-MCNC: ABNORMAL MG/DL
RBC # BLD AUTO: 5.43 MILLION/UL (ref 3.81–5.12)
RBC #/AREA URNS AUTO: ABNORMAL /HPF
RH BLD: NEGATIVE
SODIUM SERPL-SCNC: 136 MMOL/L (ref 136–145)
SP GR UR STRIP.AUTO: 1.02 (ref 1–1.03)
SPECIMEN EXPIRATION DATE: NORMAL
UROBILINOGEN UR QL STRIP.AUTO: 2 E.U./DL
WBC # BLD AUTO: 18.95 THOUSAND/UL (ref 4.31–10.16)
WBC #/AREA URNS AUTO: ABNORMAL /HPF

## 2020-04-19 PROCEDURE — 76830 TRANSVAGINAL US NON-OB: CPT

## 2020-04-19 PROCEDURE — 96375 TX/PRO/DX INJ NEW DRUG ADDON: CPT

## 2020-04-19 PROCEDURE — 87491 CHLMYD TRACH DNA AMP PROBE: CPT | Performed by: PHYSICIAN ASSISTANT

## 2020-04-19 PROCEDURE — 99284 EMERGENCY DEPT VISIT MOD MDM: CPT | Performed by: PHYSICIAN ASSISTANT

## 2020-04-19 PROCEDURE — 36415 COLL VENOUS BLD VENIPUNCTURE: CPT | Performed by: PHYSICIAN ASSISTANT

## 2020-04-19 PROCEDURE — 83690 ASSAY OF LIPASE: CPT | Performed by: PHYSICIAN ASSISTANT

## 2020-04-19 PROCEDURE — 76856 US EXAM PELVIC COMPLETE: CPT

## 2020-04-19 PROCEDURE — 86901 BLOOD TYPING SEROLOGIC RH(D): CPT | Performed by: PHYSICIAN ASSISTANT

## 2020-04-19 PROCEDURE — 74177 CT ABD & PELVIS W/CONTRAST: CPT

## 2020-04-19 PROCEDURE — 96372 THER/PROPH/DIAG INJ SC/IM: CPT

## 2020-04-19 PROCEDURE — 86900 BLOOD TYPING SEROLOGIC ABO: CPT | Performed by: PHYSICIAN ASSISTANT

## 2020-04-19 PROCEDURE — 85025 COMPLETE CBC W/AUTO DIFF WBC: CPT | Performed by: PHYSICIAN ASSISTANT

## 2020-04-19 PROCEDURE — 96374 THER/PROPH/DIAG INJ IV PUSH: CPT

## 2020-04-19 PROCEDURE — 87591 N.GONORRHOEAE DNA AMP PROB: CPT | Performed by: PHYSICIAN ASSISTANT

## 2020-04-19 PROCEDURE — 84702 CHORIONIC GONADOTROPIN TEST: CPT | Performed by: PHYSICIAN ASSISTANT

## 2020-04-19 PROCEDURE — 81001 URINALYSIS AUTO W/SCOPE: CPT | Performed by: EMERGENCY MEDICINE

## 2020-04-19 PROCEDURE — 96361 HYDRATE IV INFUSION ADD-ON: CPT

## 2020-04-19 PROCEDURE — 80053 COMPREHEN METABOLIC PANEL: CPT | Performed by: PHYSICIAN ASSISTANT

## 2020-04-19 PROCEDURE — 99284 EMERGENCY DEPT VISIT MOD MDM: CPT

## 2020-04-19 PROCEDURE — 86850 RBC ANTIBODY SCREEN: CPT | Performed by: PHYSICIAN ASSISTANT

## 2020-04-19 RX ORDER — METRONIDAZOLE 500 MG/1
500 TABLET ORAL EVERY 12 HOURS SCHEDULED
Qty: 28 TABLET | Refills: 0 | Status: SHIPPED | OUTPATIENT
Start: 2020-04-19 | End: 2020-05-03

## 2020-04-19 RX ORDER — ONDANSETRON 4 MG/1
4 TABLET, ORALLY DISINTEGRATING ORAL ONCE
Status: COMPLETED | OUTPATIENT
Start: 2020-04-19 | End: 2020-04-19

## 2020-04-19 RX ORDER — ONDANSETRON 2 MG/ML
4 INJECTION INTRAMUSCULAR; INTRAVENOUS ONCE
Status: COMPLETED | OUTPATIENT
Start: 2020-04-19 | End: 2020-04-19

## 2020-04-19 RX ORDER — NAPROXEN 500 MG/1
500 TABLET ORAL 2 TIMES DAILY PRN
Qty: 30 TABLET | Refills: 0 | Status: SHIPPED | OUTPATIENT
Start: 2020-04-19 | End: 2020-04-29 | Stop reason: SDUPTHER

## 2020-04-19 RX ORDER — ONDANSETRON 4 MG/1
4 TABLET, FILM COATED ORAL EVERY 6 HOURS PRN
Qty: 12 TABLET | Refills: 0 | Status: SHIPPED | OUTPATIENT
Start: 2020-04-19 | End: 2020-07-08

## 2020-04-19 RX ORDER — KETOROLAC TROMETHAMINE 30 MG/ML
15 INJECTION, SOLUTION INTRAMUSCULAR; INTRAVENOUS ONCE
Status: COMPLETED | OUTPATIENT
Start: 2020-04-19 | End: 2020-04-19

## 2020-04-19 RX ORDER — DOXYCYCLINE HYCLATE 100 MG/1
100 CAPSULE ORAL EVERY 12 HOURS SCHEDULED
Qty: 28 CAPSULE | Refills: 0 | Status: SHIPPED | OUTPATIENT
Start: 2020-04-19 | End: 2020-05-03

## 2020-04-19 RX ADMIN — ONDANSETRON 4 MG: 4 TABLET, ORALLY DISINTEGRATING ORAL at 16:49

## 2020-04-19 RX ADMIN — ONDANSETRON 4 MG: 2 INJECTION INTRAMUSCULAR; INTRAVENOUS at 12:19

## 2020-04-19 RX ADMIN — KETOROLAC TROMETHAMINE 15 MG: 30 INJECTION, SOLUTION INTRAMUSCULAR at 16:50

## 2020-04-19 RX ADMIN — CEFTRIAXONE SODIUM 250 MG: 250 INJECTION, POWDER, FOR SOLUTION INTRAMUSCULAR; INTRAVENOUS at 16:37

## 2020-04-19 RX ADMIN — IOHEXOL 100 ML: 350 INJECTION, SOLUTION INTRAVENOUS at 13:10

## 2020-04-19 RX ADMIN — SODIUM CHLORIDE 1000 ML: 0.9 INJECTION, SOLUTION INTRAVENOUS at 12:17

## 2020-04-19 RX ADMIN — MORPHINE SULFATE 2 MG: 2 INJECTION, SOLUTION INTRAMUSCULAR; INTRAVENOUS at 12:21

## 2020-04-20 LAB
C TRACH DNA SPEC QL NAA+PROBE: NEGATIVE
N GONORRHOEA DNA SPEC QL NAA+PROBE: NEGATIVE

## 2020-04-29 ENCOUNTER — TELEMEDICINE (OUTPATIENT)
Dept: OBGYN CLINIC | Facility: CLINIC | Age: 30
End: 2020-04-29

## 2020-04-29 DIAGNOSIS — N73.9 PELVIC INFLAMMATORY DISEASE (PID): ICD-10-CM

## 2020-04-29 DIAGNOSIS — Z31.9 PATIENT DESIRES PREGNANCY: ICD-10-CM

## 2020-04-29 DIAGNOSIS — D50.9 IRON DEFICIENCY ANEMIA, UNSPECIFIED IRON DEFICIENCY ANEMIA TYPE: ICD-10-CM

## 2020-04-29 DIAGNOSIS — N94.6 DYSMENORRHEA: Primary | ICD-10-CM

## 2020-04-29 PROCEDURE — 99213 OFFICE O/P EST LOW 20 MIN: CPT | Performed by: OBSTETRICS & GYNECOLOGY

## 2020-04-29 RX ORDER — PNV NO.95/FERROUS FUM/FOLIC AC 28MG-0.8MG
1 TABLET ORAL DAILY
Qty: 90 TABLET | Refills: 3 | Status: SHIPPED | OUTPATIENT
Start: 2020-04-29 | End: 2020-04-30

## 2020-04-29 RX ORDER — FERROUS SULFATE TAB EC 324 MG (65 MG FE EQUIVALENT) 324 (65 FE) MG
324 TABLET DELAYED RESPONSE ORAL
Qty: 30 TABLET | Refills: 11 | Status: SHIPPED | OUTPATIENT
Start: 2020-04-29 | End: 2021-12-28

## 2020-04-29 RX ORDER — NAPROXEN 500 MG/1
500 TABLET ORAL 2 TIMES DAILY PRN
Qty: 30 TABLET | Refills: 3 | Status: SHIPPED | OUTPATIENT
Start: 2020-04-29 | End: 2021-02-24 | Stop reason: HOSPADM

## 2020-04-30 RX ORDER — PNV,CALCIUM 72/IRON/FOLIC ACID 27 MG-1 MG
TABLET ORAL
Qty: 90 TABLET | Refills: 3 | Status: SHIPPED | OUTPATIENT
Start: 2020-04-30 | End: 2021-12-28

## 2020-07-07 ENCOUNTER — TELEPHONE (OUTPATIENT)
Dept: OBGYN CLINIC | Facility: CLINIC | Age: 30
End: 2020-07-07

## 2020-07-08 ENCOUNTER — ANNUAL EXAM (OUTPATIENT)
Dept: OBGYN CLINIC | Facility: CLINIC | Age: 30
End: 2020-07-08

## 2020-07-08 VITALS
BODY MASS INDEX: 51.91 KG/M2 | HEART RATE: 90 BPM | HEIGHT: 63 IN | SYSTOLIC BLOOD PRESSURE: 126 MMHG | TEMPERATURE: 97.3 F | WEIGHT: 293 LBS | DIASTOLIC BLOOD PRESSURE: 85 MMHG

## 2020-07-08 DIAGNOSIS — Z01.419 ENCOUNTER FOR GYNECOLOGICAL EXAMINATION (GENERAL) (ROUTINE) WITHOUT ABNORMAL FINDINGS: Primary | ICD-10-CM

## 2020-07-08 DIAGNOSIS — D50.9 IRON DEFICIENCY ANEMIA, UNSPECIFIED IRON DEFICIENCY ANEMIA TYPE: ICD-10-CM

## 2020-07-08 PROCEDURE — 99395 PREV VISIT EST AGE 18-39: CPT | Performed by: OBSTETRICS & GYNECOLOGY

## 2020-07-08 NOTE — PROGRESS NOTES
Assessment/Plan:     No problem-specific Assessment & Plan notes found for this encounter  Diagnoses and all orders for this visit:    Encounter for gynecological examination (general) (routine) without abnormal findings    Iron deficiency anemia, unspecified iron deficiency anemia type  -     CBC and differential; Future    Depression Screening Follow-up Plan: Patient's depression screening was positive with a PHQ-2 score of 0  Their PHQ-9 score was 0  Clinically patient does not have depression  No treatment is required  RTO prn or upon pregnancy  Subjective:      Patient ID: Karyn nAgeles is a 26 y o  female presents for annual exam   Her last pap was 06-18-18 and was negative  She has been trying to conceive with a new partner x 3 years  Her periods are regular but painful  Her partner went for semen analysis but it needs to be interpreted per urology  She herself has 3 living children  They will go to [facility] pending urology evaluation  She had STD testing on 04-19-20 which was normal      HPI    The following portions of the patient's history were reviewed and updated as appropriate: allergies, current medications, past family history, past medical history, past social history, past surgical history and problem list     Review of Systems      Objective:      /85   Pulse 90   Temp (!) 97 3 °F (36 3 °C)   Ht 5' 3" (1 6 m)   Wt 133 kg (293 lb 6 4 oz)   LMP 06/11/2020 (Exact Date)   BMI 51 97 kg/m²          Physical Exam   Constitutional: She is oriented to person, place, and time  She appears well-developed and well-nourished  No distress  HENT:   Head: Normocephalic  Neck: No thyromegaly present  Cardiovascular: Normal rate, regular rhythm and normal heart sounds  No murmur heard  Pulmonary/Chest: Effort normal and breath sounds normal  No respiratory distress  She has no wheezes  She has no rales  She exhibits no tenderness   No breast tenderness, discharge or bleeding  Abdominal: Soft  Bowel sounds are normal  She exhibits no distension and no mass  There is no tenderness  There is no rebound and no guarding  Genitourinary: Uterus normal  Rectal exam shows no external hemorrhoid  No breast tenderness, discharge or bleeding  No labial fusion  There is no rash, tenderness, lesion or injury on the right labia  There is no rash, tenderness, lesion or injury on the left labia  Cervix exhibits no motion tenderness, no discharge and no friability  Right adnexum displays no mass, no tenderness and no fullness  Left adnexum displays no mass, no tenderness and no fullness  Musculoskeletal: She exhibits no edema  Lymphadenopathy:        Right: No inguinal adenopathy present  Left: No inguinal adenopathy present  Neurological: She is alert and oriented to person, place, and time  Skin: Skin is warm and dry  Psychiatric: She has a normal mood and affect  Her behavior is normal  Judgment and thought content normal    Nursing note and vitals reviewed

## 2020-07-20 ENCOUNTER — HOSPITAL ENCOUNTER (EMERGENCY)
Facility: HOSPITAL | Age: 30
Discharge: HOME | End: 2020-07-20
Attending: EMERGENCY MEDICINE | Admitting: EMERGENCY MEDICINE
Payer: COMMERCIAL

## 2020-07-20 VITALS
DIASTOLIC BLOOD PRESSURE: 81 MMHG | RESPIRATION RATE: 16 BRPM | BODY MASS INDEX: 35.03 KG/M2 | HEART RATE: 98 BPM | SYSTOLIC BLOOD PRESSURE: 139 MMHG | TEMPERATURE: 97.8 F | WEIGHT: 197.75 LBS | OXYGEN SATURATION: 96 %

## 2020-07-20 DIAGNOSIS — R10.31 RIGHT LOWER QUADRANT ABDOMINAL PAIN: Primary | ICD-10-CM

## 2020-07-20 LAB
BACTERIA UR QL AUTO: ABNORMAL /HPF
BILIRUB UR QL STRIP: NEGATIVE
CLARITY UR: CLEAR
COLOR UR: YELLOW
COLOR, POC: YELLOW
EXT PREG TEST URINE: NORMAL
EXT. CONTROL ED NAV: NORMAL
GLUCOSE UR STRIP-MCNC: NEGATIVE MG/DL
HGB UR QL STRIP.AUTO: ABNORMAL
KETONES UR STRIP-MCNC: NEGATIVE MG/DL
LEUKOCYTE ESTERASE UR QL STRIP: NEGATIVE
NITRITE UR QL STRIP: NEGATIVE
NON-SQ EPI CELLS URNS QL MICRO: ABNORMAL /HPF
PH UR STRIP.AUTO: 6.5 [PH] (ref 4.5–8)
PROT UR STRIP-MCNC: NEGATIVE MG/DL
RBC #/AREA URNS AUTO: ABNORMAL /HPF
SP GR UR STRIP.AUTO: >=1.03 (ref 1–1.03)
UROBILINOGEN UR QL STRIP.AUTO: 0.2 E.U./DL
WBC #/AREA URNS AUTO: ABNORMAL /HPF

## 2020-07-20 PROCEDURE — 81001 URINALYSIS AUTO W/SCOPE: CPT

## 2020-07-20 PROCEDURE — 99284 EMERGENCY DEPT VISIT MOD MDM: CPT

## 2020-07-20 PROCEDURE — 99282 EMERGENCY DEPT VISIT SF MDM: CPT | Performed by: EMERGENCY MEDICINE

## 2020-07-20 PROCEDURE — 81025 URINE PREGNANCY TEST: CPT | Performed by: EMERGENCY MEDICINE

## 2020-07-20 RX ORDER — MORPHINE SULFATE 4 MG/ML
4 INJECTION, SOLUTION INTRAMUSCULAR; INTRAVENOUS ONCE
Status: DISCONTINUED | OUTPATIENT
Start: 2020-07-20 | End: 2020-07-20

## 2020-07-20 NOTE — ED PROVIDER NOTES
History  Chief Complaint   Patient presents with    Abdominal Pain     c/o sudden onset RLQ abdominal pain  denies n/v/d  last BM this morning  27-year-old female presents to the ED for approximately 1-2 hours of sudden onset abdominal pain she describes as the right lower quadrant and periumbilical area rated as a 10/10 and sharp  She said it is made worse with movement or sitting forward  States she has never had pain like this before  States this pain is not similar to her prior ovarian pathology she has had including cysts  States last menstrual period ended 4 days ago and she is usually regular  Denies any fever, night sweats, chills, sore throat, cough, chest pain, shortness of breath, nausea vomiting, diarrhea constipation dysuria, hematuria  Patient denies any prior abdominal surgeries  Not take anything for the discomfort at home  Prior to Admission Medications   Prescriptions Last Dose Informant Patient Reported? Taking? Prenatal Vit-Fe Fumarate-FA (PREPLUS) 27-1 MG TABS   No No   Sig: TAKE 1 TABLET BY MOUTH EVERY DAY   ferrous sulfate 324 (65 Fe) mg   No No   Sig: Take 1 tablet (324 mg total) by mouth daily before breakfast   naproxen (NAPROSYN) 500 mg tablet   No No   Sig: Take 1 tablet (500 mg total) by mouth 2 (two) times a day as needed for moderate pain      Facility-Administered Medications: None       Past Medical History:   Diagnosis Date    No known health problems        Past Surgical History:   Procedure Laterality Date    NO PAST SURGERIES         Family History   Problem Relation Age of Onset    Cancer Mother 48        brain cancer     I have reviewed and agree with the history as documented      E-Cigarette/Vaping     E-Cigarette/Vaping Substances     Social History     Tobacco Use    Smoking status: Never Smoker    Smokeless tobacco: Never Used   Substance Use Topics    Alcohol use: No    Drug use: No        Review of Systems   Gastrointestinal: Positive for abdominal pain  Negative for abdominal distention, anal bleeding, blood in stool, constipation, diarrhea, nausea and vomiting  All other systems reviewed and are negative  Physical Exam  ED Triage Vitals [07/20/20 1817]   Temperature Pulse Respirations Blood Pressure SpO2   97 8 °F (36 6 °C) 98 16 139/81 96 %      Temp Source Heart Rate Source Patient Position - Orthostatic VS BP Location FiO2 (%)   Temporal Monitor Standing Right arm --      Pain Score       --             Orthostatic Vital Signs  Vitals:    07/20/20 1817   BP: 139/81   Pulse: 98   Patient Position - Orthostatic VS: Standing       Physical Exam   Constitutional: She is oriented to person, place, and time  She appears well-developed and well-nourished  Non-toxic appearance  She does not appear ill  No distress  HENT:   Head: Normocephalic and atraumatic  Right Ear: External ear normal    Left Ear: External ear normal    Nose: Nose normal    Mouth/Throat: Oropharynx is clear and moist  No oropharyngeal exudate  Eyes: Pupils are equal, round, and reactive to light  Conjunctivae and EOM are normal  Right eye exhibits no discharge  Left eye exhibits no discharge  No scleral icterus  Neck: Normal range of motion  Neck supple  No JVD present  No tracheal deviation present  Cardiovascular: Normal rate, regular rhythm, normal heart sounds and intact distal pulses  Exam reveals no gallop and no friction rub  No murmur heard  Pulmonary/Chest: Effort normal and breath sounds normal  No stridor  No respiratory distress  She has no wheezes  She has no rales  Abdominal: Soft  Normal appearance and bowel sounds are normal  She exhibits no distension and no mass  There is no hepatosplenomegaly  There is tenderness in the right lower quadrant, periumbilical area and suprapubic area  There is tenderness at McBurney's point  There is no rigidity, no rebound, no guarding, no CVA tenderness and negative Delgado's sign     Musculoskeletal: Normal range of motion  She exhibits no edema, tenderness or deformity  Neurological: She is alert and oriented to person, place, and time  No cranial nerve deficit or sensory deficit  She exhibits normal muscle tone  Skin: Skin is warm and dry  No rash noted  She is not diaphoretic  No erythema  No pallor  Psychiatric: She has a normal mood and affect  Her behavior is normal  Judgment and thought content normal    Nursing note and vitals reviewed        ED Medications  Medications - No data to display    Diagnostic Studies  Results Reviewed     Procedure Component Value Units Date/Time    Urine Microscopic [[id number]]  (Abnormal) Collected:  07/20/20 1900    Lab Status:  Final result Specimen:  Urine, Clean Catch Updated:  07/20/20 1914     RBC, UA 1-2 /hpf      WBC, UA 0-1 /hpf      Epithelial Cells Occasional /hpf      Bacteria, UA Occasional /hpf     POCT pregnancy, urine [[id number]]  (Normal) Resulted:  07/20/20 1904    Lab Status:  Final result Updated:  07/20/20 1904     EXT PREG TEST UR (Ref: Negative) NEG (-)     Control Valid    POCT urinalysis dipstick [[id number]]  (Normal) Resulted:  07/20/20 1904    Lab Status:  Final result Updated:  07/20/20 1904     Color, UA yellow    Urine Macroscopic, POC [[id number]]  (Abnormal) Collected:  07/20/20 1900    Lab Status:  Final result Specimen:  Urine Updated:  07/20/20 1901     Color, UA Yellow     Clarity, UA Clear     pH, UA 6 5     Leukocytes, UA Negative     Nitrite, UA Negative     Protein, UA Negative mg/dl      Glucose, UA Negative mg/dl      Ketones, UA Negative mg/dl      Urobilinogen, UA 0 2 E U /dl      Bilirubin, UA Negative     Blood, UA Trace     Specific Gravity, UA >=1 030    Narrative:       CLINITEK RESULT                 No orders to display         Procedures  Procedures      ED Course  ED Course as of Jul 21 0058   Mon Jul 20, 2020   7678 Prior to medication administration patient's pain completely resolved at this time, will monitor for a little while      1928 Patient continuing to not have pain at this time, will discharge with strict return precautions to the ED          US AUDIT      Most Recent Value   Initial Alcohol Screen: US AUDIT-C    1  How often do you have a drink containing alcohol?  0 Filed at: 07/20/2020 1817   2  How many drinks containing alcohol do you have on a typical day you are drinking? 0 Filed at: 07/20/2020 1817   3b  FEMALE Any Age, or MALE 65+: How often do you have 4 or more drinks on one occassion? 0 Filed at: 07/20/2020 1817   Audit-C Score  0 Filed at: 07/20/2020 1817                  VILMA/DAST-10      Most Recent Value   How many times in the past year have you    Used an illegal drug or used a prescription medication for non-medical reasons? Never Filed at: 07/20/2020 1817                              [facility]  Number of Diagnoses or Management Options  Right lower quadrant abdominal pain:   Diagnosis management comments: Will check abdominal labs, UA, U preg, CT abdomen pelvis to evaluate for possible appendicitis  Treat patient's pain  She is not nauseous at this time  Initial workup changed as patient had complete resolution of symptoms prior to starting IV line  Patient evaluated for approximately 1 more hour in the ED watching for return of symptoms  Discussed return precautions with patient patient's   Disposition  Final diagnoses:   Right lower quadrant abdominal pain     Time reflects when diagnosis was documented in both MDM as applicable and the Disposition within this note     Time User Action Codes Description Comment    7/20/2020  7:28 PM Keena Joshi Add [R10 31] Right lower quadrant abdominal pain       ED Disposition     ED Disposition Condition Date/Time Comment    Discharge Stable Mon Jul 20, 2020  7:28 PM Matthew Rowley discharge to home/self care              Follow-up Information     Follow up With Specialties Details Why Contact Info Additional Information    [employer]  Call For a Primary Care Doctor [address] [facility] Emergency Department Emergency Medicine Go to  As needed, If symptoms worsen [facility] [id number] [phone] [place] ED, [address]  , [place], [place], [address]          Discharge Medication List as of 7/20/2020  7:29 PM      CONTINUE these medications which have NOT CHANGED    Details   ferrous sulfate 324 (65 Fe) mg Take 1 tablet (324 mg total) by mouth daily before breakfast, Starting Wed 4/29/2020, Normal      naproxen (NAPROSYN) 500 mg tablet Take 1 tablet (500 mg total) by mouth 2 (two) times a day as needed for moderate pain, Starting Wed 4/29/2020, Normal      Prenatal Vit-Fe Fumarate-FA (PREPLUS) 27-1 MG TABS TAKE 1 TABLET BY MOUTH EVERY DAY, Normal           No discharge procedures on file  PDMP Review     None           ED Provider  Attending physically available and evaluated Natasha Arriaga I managed the patient along with the ED Attending      Electronically Signed by         Smiona Strong DO  07/21/20 3213

## 2020-07-20 NOTE — DISCHARGE INSTRUCTIONS
If you develop any new or worsening symptoms please immediately return to your nearest emergency department

## 2020-07-20 NOTE — ED ATTENDING ATTESTATION
7/20/2020  Rg VANCE DO, saw and evaluated the patient  I have discussed the patient with the resident/non-physician practitioner and agree with the resident's/non-physician practitioner's findings, Plan of Care, and MDM as documented in the resident's/non-physician practitioner's note, except where noted  All available labs and Radiology studies were reviewed  I was present for key portions of any procedure(s) performed by the resident/non-physician practitioner and I was immediately available to provide assistance  At this point I agree with the current assessment done in the Emergency Department  I have conducted an independent evaluation of this patient a history and physical is as follows:    ED Course         Critical Care Time  Procedures    58-year-old female presents to the emergency department complaining of sudden onset of right lower quadrant pain and feeling like her abdomen was hard around the umbilicus  No nausea or vomiting  No diarrhea  No urinary complaints  On my exam, the patient now states the pain is completely gone  She has absolutely no abdominal tenderness on my exam at this point  Patient is agreeable for observation to make sure her pain does not return  Will check urine to rule out UTI/pregnancy  Possibly this could have been a kidney stone that has now passed  Highly doubt acute appendicitis or ruptured cyst or ovarian torsion given that her pain is completely gone  This could also have been a hernia that she reduced but states she never had a hernia before  Will reassess

## 2020-10-04 ENCOUNTER — HOSPITAL ENCOUNTER (EMERGENCY)
Facility: HOSPITAL | Age: 30
Discharge: HOME | End: 2020-10-04
Attending: EMERGENCY MEDICINE | Admitting: EMERGENCY MEDICINE
Payer: COMMERCIAL

## 2020-10-04 ENCOUNTER — APPOINTMENT (EMERGENCY)
Dept: CT IMAGING | Facility: HOSPITAL | Age: 30
End: 2020-10-04
Payer: COMMERCIAL

## 2020-10-04 ENCOUNTER — APPOINTMENT (EMERGENCY)
Dept: ULTRASOUND IMAGING | Facility: HOSPITAL | Age: 30
End: 2020-10-04
Payer: COMMERCIAL

## 2020-10-04 VITALS
HEART RATE: 87 BPM | DIASTOLIC BLOOD PRESSURE: 63 MMHG | RESPIRATION RATE: 16 BRPM | TEMPERATURE: 98 F | OXYGEN SATURATION: 100 % | BODY MASS INDEX: 35.03 KG/M2 | WEIGHT: 197.75 LBS | SYSTOLIC BLOOD PRESSURE: 117 MMHG

## 2020-10-04 DIAGNOSIS — R93.89 ABNORMAL CT SCAN: ICD-10-CM

## 2020-10-04 DIAGNOSIS — N83.209 OVARIAN CYST: ICD-10-CM

## 2020-10-04 DIAGNOSIS — N73.0 PID (ACUTE PELVIC INFLAMMATORY DISEASE): Primary | ICD-10-CM

## 2020-10-04 LAB
ALBUMIN SERPL BCP-MCNC: 4.2 G/DL (ref 3.5–5)
ALP SERPL-CCNC: 49 U/L (ref 46–116)
ALT SERPL W P-5'-P-CCNC: 22 U/L (ref 12–78)
ANION GAP SERPL CALCULATED.3IONS-SCNC: 11 MMOL/L (ref 4–13)
AST SERPL W P-5'-P-CCNC: 17 U/L (ref 5–45)
BACTERIA UR QL AUTO: ABNORMAL /HPF
BASOPHILS # BLD AUTO: 0.05 THOUSANDS/ΜL (ref 0–0.1)
BASOPHILS NFR BLD AUTO: 0 % (ref 0–1)
BILIRUB SERPL-MCNC: 0.58 MG/DL (ref 0.2–1)
BILIRUB UR QL STRIP: ABNORMAL
BUN SERPL-MCNC: 7 MG/DL (ref 5–25)
CALCIUM SERPL-MCNC: 8.6 MG/DL (ref 8.3–10.1)
CHLORIDE SERPL-SCNC: 101 MMOL/L (ref 100–108)
CLARITY UR: ABNORMAL
CO2 SERPL-SCNC: 25 MMOL/L (ref 21–32)
COLOR UR: ABNORMAL
CREAT SERPL-MCNC: 0.72 MG/DL (ref 0.6–1.3)
EOSINOPHIL # BLD AUTO: 0.02 THOUSAND/ΜL (ref 0–0.61)
EOSINOPHIL NFR BLD AUTO: 0 % (ref 0–6)
ERYTHROCYTE [DISTWIDTH] IN BLOOD BY AUTOMATED COUNT: 17.4 % (ref 11.6–15.1)
EXT PREG TEST URINE: NORMAL
EXT. CONTROL ED NAV: NORMAL
GFR SERPL CREATININE-BSD FRML MDRD: 113 ML/MIN/1.73SQ M
GLUCOSE SERPL-MCNC: 107 MG/DL (ref 65–140)
GLUCOSE UR STRIP-MCNC: NEGATIVE MG/DL
HCT VFR BLD AUTO: 36.4 % (ref 34.8–46.1)
HGB BLD-MCNC: 10.3 G/DL (ref 11.5–15.4)
HGB UR QL STRIP.AUTO: ABNORMAL
IMM GRANULOCYTES # BLD AUTO: 0.06 THOUSAND/UL (ref 0–0.2)
IMM GRANULOCYTES NFR BLD AUTO: 1 % (ref 0–2)
KETONES UR STRIP-MCNC: NEGATIVE MG/DL
LEUKOCYTE ESTERASE UR QL STRIP: ABNORMAL
LIPASE SERPL-CCNC: 96 U/L (ref 73–393)
LYMPHOCYTES # BLD AUTO: 1.42 THOUSANDS/ΜL (ref 0.6–4.47)
LYMPHOCYTES NFR BLD AUTO: 11 % (ref 14–44)
MCH RBC QN AUTO: 19.1 PG (ref 26.8–34.3)
MCHC RBC AUTO-ENTMCNC: 28.3 G/DL (ref 31.4–37.4)
MCV RBC AUTO: 67 FL (ref 82–98)
MONOCYTES # BLD AUTO: 0.59 THOUSAND/ΜL (ref 0.17–1.22)
MONOCYTES NFR BLD AUTO: 5 % (ref 4–12)
NEUTROPHILS # BLD AUTO: 10.93 THOUSANDS/ΜL (ref 1.85–7.62)
NEUTS SEG NFR BLD AUTO: 83 % (ref 43–75)
NITRITE UR QL STRIP: NEGATIVE
NON-SQ EPI CELLS URNS QL MICRO: ABNORMAL /HPF
NRBC BLD AUTO-RTO: 0 /100 WBCS
PH UR STRIP.AUTO: 5.5 [PH]
PLATELET # BLD AUTO: 306 THOUSANDS/UL (ref 149–390)
PMV BLD AUTO: 11.4 FL (ref 8.9–12.7)
POTASSIUM SERPL-SCNC: 3.6 MMOL/L (ref 3.5–5.3)
PROT SERPL-MCNC: 8.2 G/DL (ref 6.4–8.2)
PROT UR STRIP-MCNC: ABNORMAL MG/DL
RBC # BLD AUTO: 5.4 MILLION/UL (ref 3.81–5.12)
RBC #/AREA URNS AUTO: ABNORMAL /HPF
SODIUM SERPL-SCNC: 137 MMOL/L (ref 136–145)
SP GR UR STRIP.AUTO: >=1.03 (ref 1–1.03)
UROBILINOGEN UR QL STRIP.AUTO: 0.2 E.U./DL
WBC # BLD AUTO: 13.07 THOUSAND/UL (ref 4.31–10.16)
WBC #/AREA URNS AUTO: ABNORMAL /HPF

## 2020-10-04 PROCEDURE — 96374 THER/PROPH/DIAG INJ IV PUSH: CPT

## 2020-10-04 PROCEDURE — 99284 EMERGENCY DEPT VISIT MOD MDM: CPT

## 2020-10-04 PROCEDURE — 81025 URINE PREGNANCY TEST: CPT | Performed by: PHYSICIAN ASSISTANT

## 2020-10-04 PROCEDURE — 96372 THER/PROPH/DIAG INJ SC/IM: CPT

## 2020-10-04 PROCEDURE — 99285 EMERGENCY DEPT VISIT HI MDM: CPT | Performed by: EMERGENCY MEDICINE

## 2020-10-04 PROCEDURE — G1004 CDSM NDSC: HCPCS

## 2020-10-04 PROCEDURE — 76856 US EXAM PELVIC COMPLETE: CPT

## 2020-10-04 PROCEDURE — 87086 URINE CULTURE/COLONY COUNT: CPT | Performed by: PHYSICIAN ASSISTANT

## 2020-10-04 PROCEDURE — 81001 URINALYSIS AUTO W/SCOPE: CPT | Performed by: PHYSICIAN ASSISTANT

## 2020-10-04 PROCEDURE — 74177 CT ABD & PELVIS W/CONTRAST: CPT

## 2020-10-04 PROCEDURE — 36415 COLL VENOUS BLD VENIPUNCTURE: CPT | Performed by: PHYSICIAN ASSISTANT

## 2020-10-04 PROCEDURE — 96361 HYDRATE IV INFUSION ADD-ON: CPT

## 2020-10-04 PROCEDURE — 96375 TX/PRO/DX INJ NEW DRUG ADDON: CPT

## 2020-10-04 PROCEDURE — 85025 COMPLETE CBC W/AUTO DIFF WBC: CPT | Performed by: PHYSICIAN ASSISTANT

## 2020-10-04 PROCEDURE — 76830 TRANSVAGINAL US NON-OB: CPT

## 2020-10-04 PROCEDURE — 83690 ASSAY OF LIPASE: CPT | Performed by: PHYSICIAN ASSISTANT

## 2020-10-04 PROCEDURE — NC001 PR NO CHARGE: Performed by: OBSTETRICS & GYNECOLOGY

## 2020-10-04 PROCEDURE — 80053 COMPREHEN METABOLIC PANEL: CPT | Performed by: PHYSICIAN ASSISTANT

## 2020-10-04 RX ORDER — KETOROLAC TROMETHAMINE 30 MG/ML
15 INJECTION, SOLUTION INTRAMUSCULAR; INTRAVENOUS ONCE
Status: COMPLETED | OUTPATIENT
Start: 2020-10-04 | End: 2020-10-04

## 2020-10-04 RX ORDER — DOXYCYCLINE HYCLATE 100 MG/1
100 TABLET, DELAYED RELEASE ORAL 2 TIMES DAILY
Qty: 28 TABLET | Refills: 0 | Status: SHIPPED | OUTPATIENT
Start: 2020-10-04 | End: 2020-10-18

## 2020-10-04 RX ORDER — ONDANSETRON 2 MG/ML
4 INJECTION INTRAMUSCULAR; INTRAVENOUS ONCE
Status: COMPLETED | OUTPATIENT
Start: 2020-10-04 | End: 2020-10-04

## 2020-10-04 RX ORDER — METRONIDAZOLE 500 MG/1
500 TABLET ORAL EVERY 12 HOURS SCHEDULED
Qty: 28 TABLET | Refills: 0 | Status: SHIPPED | OUTPATIENT
Start: 2020-10-04 | End: 2020-10-18

## 2020-10-04 RX ORDER — OXYCODONE HYDROCHLORIDE 5 MG/1
5 TABLET ORAL ONCE
Status: DISCONTINUED | OUTPATIENT
Start: 2020-10-04 | End: 2020-10-05 | Stop reason: HOSPADM

## 2020-10-04 RX ORDER — METRONIDAZOLE 500 MG/1
500 TABLET ORAL ONCE
Status: COMPLETED | OUTPATIENT
Start: 2020-10-04 | End: 2020-10-04

## 2020-10-04 RX ORDER — DOXYCYCLINE HYCLATE 100 MG/1
100 CAPSULE ORAL ONCE
Status: COMPLETED | OUTPATIENT
Start: 2020-10-04 | End: 2020-10-04

## 2020-10-04 RX ADMIN — KETOROLAC TROMETHAMINE 15 MG: 30 INJECTION, SOLUTION INTRAMUSCULAR at 16:34

## 2020-10-04 RX ADMIN — SODIUM CHLORIDE 1000 ML: 0.9 INJECTION, SOLUTION INTRAVENOUS at 16:38

## 2020-10-04 RX ADMIN — METRONIDAZOLE 500 MG: 500 TABLET, FILM COATED ORAL at 21:50

## 2020-10-04 RX ADMIN — IOHEXOL 100 ML: 350 INJECTION, SOLUTION INTRAVENOUS at 17:55

## 2020-10-04 RX ADMIN — ONDANSETRON 4 MG: 2 INJECTION INTRAMUSCULAR; INTRAVENOUS at 16:33

## 2020-10-04 RX ADMIN — DOXYCYCLINE 100 MG: 100 CAPSULE ORAL at 21:50

## 2020-10-04 RX ADMIN — LIDOCAINE HYDROCHLORIDE 250 MG: 10 INJECTION, SOLUTION EPIDURAL; INFILTRATION; INTRACAUDAL; PERINEURAL at 21:52

## 2020-10-04 RX ADMIN — MORPHINE SULFATE 2 MG: 2 INJECTION, SOLUTION INTRAMUSCULAR; INTRAVENOUS at 16:33

## 2020-10-05 LAB — BACTERIA UR CULT: NORMAL

## 2020-10-06 LAB
C TRACH DNA SPEC QL NAA+PROBE: ABNORMAL
N GONORRHOEA DNA SPEC QL NAA+PROBE: ABNORMAL

## 2020-10-21 ENCOUNTER — OFFICE VISIT (OUTPATIENT)
Dept: OBGYN CLINIC | Facility: CLINIC | Age: 30
End: 2020-10-21

## 2020-10-21 VITALS
HEART RATE: 98 BPM | TEMPERATURE: 98.4 F | SYSTOLIC BLOOD PRESSURE: 133 MMHG | BODY MASS INDEX: 35.14 KG/M2 | WEIGHT: 198.4 LBS | DIASTOLIC BLOOD PRESSURE: 88 MMHG

## 2020-10-21 DIAGNOSIS — N70.11 HYDROSALPINX: ICD-10-CM

## 2020-10-21 DIAGNOSIS — N89.8 VAGINAL DISCHARGE: Primary | ICD-10-CM

## 2020-10-21 DIAGNOSIS — N83.201 HEMORRHAGIC CYST OF RIGHT OVARY: ICD-10-CM

## 2020-10-21 PROCEDURE — 99213 OFFICE O/P EST LOW 20 MIN: CPT | Performed by: OBSTETRICS & GYNECOLOGY

## 2020-10-21 PROCEDURE — 87591 N.GONORRHOEAE DNA AMP PROB: CPT | Performed by: STUDENT IN AN ORGANIZED HEALTH CARE EDUCATION/TRAINING PROGRAM

## 2020-10-21 PROCEDURE — 87070 CULTURE OTHR SPECIMN AEROBIC: CPT | Performed by: STUDENT IN AN ORGANIZED HEALTH CARE EDUCATION/TRAINING PROGRAM

## 2020-10-21 PROCEDURE — 87106 FUNGI IDENTIFICATION YEAST: CPT | Performed by: STUDENT IN AN ORGANIZED HEALTH CARE EDUCATION/TRAINING PROGRAM

## 2020-10-21 PROCEDURE — 87491 CHLMYD TRACH DNA AMP PROBE: CPT | Performed by: STUDENT IN AN ORGANIZED HEALTH CARE EDUCATION/TRAINING PROGRAM

## 2020-10-23 LAB
C TRACH DNA SPEC QL NAA+PROBE: NEGATIVE
N GONORRHOEA DNA SPEC QL NAA+PROBE: NEGATIVE

## 2020-10-25 LAB
BACTERIA GENITAL AEROBE CULT: ABNORMAL
BACTERIA GENITAL AEROBE CULT: ABNORMAL

## 2020-10-26 ENCOUNTER — TELEPHONE (OUTPATIENT)
Dept: OBGYN CLINIC | Facility: CLINIC | Age: 30
End: 2020-10-26

## 2020-10-26 DIAGNOSIS — B37.9 CANDIDA GLABRATA INFECTION: Primary | ICD-10-CM

## 2020-10-26 RX ORDER — FLUCONAZOLE 200 MG/1
200 TABLET ORAL DAILY
Qty: 14 TABLET | Refills: 0 | Status: SHIPPED | OUTPATIENT
Start: 2020-10-26 | End: 2020-11-09

## 2020-11-04 ENCOUNTER — APPOINTMENT (EMERGENCY)
Dept: CT IMAGING | Facility: HOSPITAL | Age: 30
DRG: 532 | End: 2020-11-04
Payer: COMMERCIAL

## 2020-11-04 ENCOUNTER — APPOINTMENT (INPATIENT)
Dept: RADIOLOGY | Facility: HOSPITAL | Age: 30
DRG: 532 | End: 2020-11-04
Payer: COMMERCIAL

## 2020-11-04 ENCOUNTER — HOSPITAL ENCOUNTER (INPATIENT)
Facility: HOSPITAL | Age: 30
LOS: 2 days | Discharge: HOME | DRG: 532 | End: 2020-11-06
Attending: EMERGENCY MEDICINE | Admitting: OBSTETRICS & GYNECOLOGY
Payer: COMMERCIAL

## 2020-11-04 DIAGNOSIS — R10.9 ABDOMINAL PAIN: ICD-10-CM

## 2020-11-04 DIAGNOSIS — N94.9 ADNEXAL CYST: ICD-10-CM

## 2020-11-04 DIAGNOSIS — R50.9 FEVER: Primary | ICD-10-CM

## 2020-11-04 DIAGNOSIS — N73.9 PID (PELVIC INFLAMMATORY DISEASE): ICD-10-CM

## 2020-11-04 LAB
ALBUMIN SERPL BCP-MCNC: 3.8 G/DL (ref 3.5–5)
ALP SERPL-CCNC: 52 U/L (ref 46–116)
ALT SERPL W P-5'-P-CCNC: 18 U/L (ref 12–78)
ANION GAP SERPL CALCULATED.3IONS-SCNC: 8 MMOL/L (ref 4–13)
APTT PPP: 35 SECONDS (ref 23–37)
AST SERPL W P-5'-P-CCNC: 10 U/L (ref 5–45)
BACTERIA UR QL AUTO: ABNORMAL /HPF
BASOPHILS # BLD AUTO: 0.05 THOUSANDS/ΜL (ref 0–0.1)
BASOPHILS NFR BLD AUTO: 0 % (ref 0–1)
BILIRUB DIRECT SERPL-MCNC: 0.16 MG/DL (ref 0–0.2)
BILIRUB SERPL-MCNC: 0.85 MG/DL (ref 0.2–1)
BILIRUB UR QL STRIP: NEGATIVE
BUN SERPL-MCNC: 6 MG/DL (ref 5–25)
CALCIUM SERPL-MCNC: 8.4 MG/DL (ref 8.3–10.1)
CHLORIDE SERPL-SCNC: 101 MMOL/L (ref 100–108)
CLARITY UR: CLEAR
CO2 SERPL-SCNC: 26 MMOL/L (ref 21–32)
COLOR UR: YELLOW
CREAT SERPL-MCNC: 0.56 MG/DL (ref 0.6–1.3)
EOSINOPHIL # BLD AUTO: 0.08 THOUSAND/ΜL (ref 0–0.61)
EOSINOPHIL NFR BLD AUTO: 1 % (ref 0–6)
ERYTHROCYTE [DISTWIDTH] IN BLOOD BY AUTOMATED COUNT: 17 % (ref 11.6–15.1)
EXT PREG TEST URINE: NEGATIVE
EXT. CONTROL ED NAV: NORMAL
GFR SERPL CREATININE-BSD FRML MDRD: 125 ML/MIN/1.73SQ M
GLUCOSE SERPL-MCNC: 96 MG/DL (ref 65–140)
GLUCOSE UR STRIP-MCNC: NEGATIVE MG/DL
HCT VFR BLD AUTO: 33.7 % (ref 34.8–46.1)
HGB BLD-MCNC: 9.7 G/DL (ref 11.5–15.4)
HGB UR QL STRIP.AUTO: ABNORMAL
IMM GRANULOCYTES # BLD AUTO: 0.08 THOUSAND/UL (ref 0–0.2)
IMM GRANULOCYTES NFR BLD AUTO: 1 % (ref 0–2)
INR PPP: 1.05 (ref 0.84–1.19)
KETONES UR STRIP-MCNC: NEGATIVE MG/DL
LACTATE SERPL-SCNC: 1 MMOL/L (ref 0.5–2)
LEUKOCYTE ESTERASE UR QL STRIP: ABNORMAL
LYMPHOCYTES # BLD AUTO: 1.36 THOUSANDS/ΜL (ref 0.6–4.47)
LYMPHOCYTES NFR BLD AUTO: 10 % (ref 14–44)
MCH RBC QN AUTO: 19.2 PG (ref 26.8–34.3)
MCHC RBC AUTO-ENTMCNC: 28.8 G/DL (ref 31.4–37.4)
MCV RBC AUTO: 67 FL (ref 82–98)
MONOCYTES # BLD AUTO: 0.81 THOUSAND/ΜL (ref 0.17–1.22)
MONOCYTES NFR BLD AUTO: 6 % (ref 4–12)
NEUTROPHILS # BLD AUTO: 11.75 THOUSANDS/ΜL (ref 1.85–7.62)
NEUTS SEG NFR BLD AUTO: 82 % (ref 43–75)
NITRITE UR QL STRIP: NEGATIVE
NON-SQ EPI CELLS URNS QL MICRO: ABNORMAL /HPF
NRBC BLD AUTO-RTO: 0 /100 WBCS
PH UR STRIP.AUTO: 7 [PH] (ref 4.5–8)
PLATELET # BLD AUTO: 269 THOUSANDS/UL (ref 149–390)
PMV BLD AUTO: 10.7 FL (ref 8.9–12.7)
POTASSIUM SERPL-SCNC: 3.3 MMOL/L (ref 3.5–5.3)
PROCALCITONIN SERPL-MCNC: <0.05 NG/ML
PROT SERPL-MCNC: 8.1 G/DL (ref 6.4–8.2)
PROT UR STRIP-MCNC: NEGATIVE MG/DL
PROTHROMBIN TIME: 13.5 SECONDS (ref 11.6–14.5)
RBC # BLD AUTO: 5.06 MILLION/UL (ref 3.81–5.12)
RBC #/AREA URNS AUTO: ABNORMAL /HPF
SARS-COV-2 RNA RESP QL NAA+PROBE: NEGATIVE
SODIUM SERPL-SCNC: 135 MMOL/L (ref 136–145)
SP GR UR STRIP.AUTO: 1.02 (ref 1–1.03)
UROBILINOGEN UR QL STRIP.AUTO: 0.2 E.U./DL
WBC # BLD AUTO: 14.13 THOUSAND/UL (ref 4.31–10.16)
WBC #/AREA URNS AUTO: ABNORMAL /HPF

## 2020-11-04 PROCEDURE — G1004 CDSM NDSC: HCPCS

## 2020-11-04 PROCEDURE — 80048 BASIC METABOLIC PNL TOTAL CA: CPT | Performed by: EMERGENCY MEDICINE

## 2020-11-04 PROCEDURE — 96360 HYDRATION IV INFUSION INIT: CPT

## 2020-11-04 PROCEDURE — 87086 URINE CULTURE/COLONY COUNT: CPT

## 2020-11-04 PROCEDURE — 99285 EMERGENCY DEPT VISIT HI MDM: CPT

## 2020-11-04 PROCEDURE — 87040 BLOOD CULTURE FOR BACTERIA: CPT | Performed by: EMERGENCY MEDICINE

## 2020-11-04 PROCEDURE — 96365 THER/PROPH/DIAG IV INF INIT: CPT

## 2020-11-04 PROCEDURE — 83605 ASSAY OF LACTIC ACID: CPT | Performed by: EMERGENCY MEDICINE

## 2020-11-04 PROCEDURE — 71045 X-RAY EXAM CHEST 1 VIEW: CPT

## 2020-11-04 PROCEDURE — 87491 CHLMYD TRACH DNA AMP PROBE: CPT | Performed by: EMERGENCY MEDICINE

## 2020-11-04 PROCEDURE — 99285 EMERGENCY DEPT VISIT HI MDM: CPT | Performed by: EMERGENCY MEDICINE

## 2020-11-04 PROCEDURE — 80076 HEPATIC FUNCTION PANEL: CPT | Performed by: EMERGENCY MEDICINE

## 2020-11-04 PROCEDURE — 74177 CT ABD & PELVIS W/CONTRAST: CPT

## 2020-11-04 PROCEDURE — 85025 COMPLETE CBC W/AUTO DIFF WBC: CPT | Performed by: EMERGENCY MEDICINE

## 2020-11-04 PROCEDURE — 85610 PROTHROMBIN TIME: CPT | Performed by: EMERGENCY MEDICINE

## 2020-11-04 PROCEDURE — 84145 PROCALCITONIN (PCT): CPT | Performed by: EMERGENCY MEDICINE

## 2020-11-04 PROCEDURE — 81025 URINE PREGNANCY TEST: CPT | Performed by: EMERGENCY MEDICINE

## 2020-11-04 PROCEDURE — 96361 HYDRATE IV INFUSION ADD-ON: CPT

## 2020-11-04 PROCEDURE — 81001 URINALYSIS AUTO W/SCOPE: CPT

## 2020-11-04 PROCEDURE — 87635 SARS-COV-2 COVID-19 AMP PRB: CPT | Performed by: EMERGENCY MEDICINE

## 2020-11-04 PROCEDURE — 87591 N.GONORRHOEAE DNA AMP PROB: CPT | Performed by: EMERGENCY MEDICINE

## 2020-11-04 PROCEDURE — 96375 TX/PRO/DX INJ NEW DRUG ADDON: CPT

## 2020-11-04 PROCEDURE — 85730 THROMBOPLASTIN TIME PARTIAL: CPT | Performed by: EMERGENCY MEDICINE

## 2020-11-04 PROCEDURE — NC001 PR NO CHARGE: Performed by: OBSTETRICS & GYNECOLOGY

## 2020-11-04 PROCEDURE — 36415 COLL VENOUS BLD VENIPUNCTURE: CPT | Performed by: EMERGENCY MEDICINE

## 2020-11-04 RX ORDER — SODIUM CHLORIDE, SODIUM LACTATE, POTASSIUM CHLORIDE, CALCIUM CHLORIDE 600; 310; 30; 20 MG/100ML; MG/100ML; MG/100ML; MG/100ML
125 INJECTION, SOLUTION INTRAVENOUS CONTINUOUS
Status: DISCONTINUED | OUTPATIENT
Start: 2020-11-04 | End: 2020-11-06 | Stop reason: HOSPADM

## 2020-11-04 RX ORDER — DOXYCYCLINE HYCLATE 100 MG/1
100 CAPSULE ORAL EVERY 12 HOURS SCHEDULED
Status: DISCONTINUED | OUTPATIENT
Start: 2020-11-05 | End: 2020-11-05

## 2020-11-04 RX ORDER — KETOROLAC TROMETHAMINE 30 MG/ML
15 INJECTION, SOLUTION INTRAMUSCULAR; INTRAVENOUS ONCE
Status: COMPLETED | OUTPATIENT
Start: 2020-11-04 | End: 2020-11-04

## 2020-11-04 RX ORDER — MORPHINE SULFATE 4 MG/ML
8 INJECTION, SOLUTION INTRAMUSCULAR; INTRAVENOUS ONCE
Status: COMPLETED | OUTPATIENT
Start: 2020-11-04 | End: 2020-11-04

## 2020-11-04 RX ADMIN — IOHEXOL 100 ML: 350 INJECTION, SOLUTION INTRAVENOUS at 18:53

## 2020-11-04 RX ADMIN — MORPHINE SULFATE 4 MG: 4 INJECTION INTRAVENOUS at 17:12

## 2020-11-04 RX ADMIN — SODIUM CHLORIDE 1000 ML: 0.9 INJECTION, SOLUTION INTRAVENOUS at 17:11

## 2020-11-04 RX ADMIN — SODIUM CHLORIDE, SODIUM LACTATE, POTASSIUM CHLORIDE, AND CALCIUM CHLORIDE 125 ML/HR: .6; .31; .03; .02 INJECTION, SOLUTION INTRAVENOUS at 21:51

## 2020-11-04 RX ADMIN — SODIUM CHLORIDE 3 G: 9 INJECTION, SOLUTION INTRAVENOUS at 20:39

## 2020-11-04 RX ADMIN — KETOROLAC TROMETHAMINE 15 MG: 30 INJECTION, SOLUTION INTRAMUSCULAR at 18:11

## 2020-11-04 RX ADMIN — DOXYCYCLINE 100 MG: 100 INJECTION, POWDER, LYOPHILIZED, FOR SOLUTION INTRAVENOUS at 21:07

## 2020-11-05 LAB
BACTERIA UR CULT: NORMAL
BASOPHILS # BLD AUTO: 0.04 THOUSANDS/ΜL (ref 0–0.1)
BASOPHILS NFR BLD AUTO: 0 % (ref 0–1)
C TRACH DNA SPEC QL NAA+PROBE: NEGATIVE
EOSINOPHIL # BLD AUTO: 0.04 THOUSAND/ΜL (ref 0–0.61)
EOSINOPHIL NFR BLD AUTO: 0 % (ref 0–6)
ERYTHROCYTE [DISTWIDTH] IN BLOOD BY AUTOMATED COUNT: 16.9 % (ref 11.6–15.1)
HCT VFR BLD AUTO: 33.3 % (ref 34.8–46.1)
HGB BLD-MCNC: 9.5 G/DL (ref 11.5–15.4)
IMM GRANULOCYTES # BLD AUTO: 0.03 THOUSAND/UL (ref 0–0.2)
IMM GRANULOCYTES NFR BLD AUTO: 0 % (ref 0–2)
LYMPHOCYTES # BLD AUTO: 1.36 THOUSANDS/ΜL (ref 0.6–4.47)
LYMPHOCYTES NFR BLD AUTO: 10 % (ref 14–44)
MCH RBC QN AUTO: 19.2 PG (ref 26.8–34.3)
MCHC RBC AUTO-ENTMCNC: 28.5 G/DL (ref 31.4–37.4)
MCV RBC AUTO: 67 FL (ref 82–98)
MONOCYTES # BLD AUTO: 0.75 THOUSAND/ΜL (ref 0.17–1.22)
MONOCYTES NFR BLD AUTO: 6 % (ref 4–12)
N GONORRHOEA DNA SPEC QL NAA+PROBE: NEGATIVE
NEUTROPHILS # BLD AUTO: 10.99 THOUSANDS/ΜL (ref 1.85–7.62)
NEUTS SEG NFR BLD AUTO: 84 % (ref 43–75)
NRBC BLD AUTO-RTO: 0 /100 WBCS
PLATELET # BLD AUTO: 251 THOUSANDS/UL (ref 149–390)
PMV BLD AUTO: 10.5 FL (ref 8.9–12.7)
PROCALCITONIN SERPL-MCNC: <0.05 NG/ML
RBC # BLD AUTO: 4.95 MILLION/UL (ref 3.81–5.12)
WBC # BLD AUTO: 13.21 THOUSAND/UL (ref 4.31–10.16)

## 2020-11-05 PROCEDURE — 99232 SBSQ HOSP IP/OBS MODERATE 35: CPT | Performed by: OBSTETRICS & GYNECOLOGY

## 2020-11-05 PROCEDURE — 85025 COMPLETE CBC W/AUTO DIFF WBC: CPT | Performed by: OBSTETRICS & GYNECOLOGY

## 2020-11-05 PROCEDURE — 84145 PROCALCITONIN (PCT): CPT | Performed by: EMERGENCY MEDICINE

## 2020-11-05 RX ORDER — ACETAMINOPHEN 325 MG/1
650 TABLET ORAL EVERY 6 HOURS PRN
Status: DISCONTINUED | OUTPATIENT
Start: 2020-11-04 | End: 2020-11-06 | Stop reason: HOSPADM

## 2020-11-05 RX ORDER — OXYCODONE HYDROCHLORIDE 5 MG/1
5 TABLET ORAL EVERY 4 HOURS PRN
Status: DISCONTINUED | OUTPATIENT
Start: 2020-11-04 | End: 2020-11-06 | Stop reason: HOSPADM

## 2020-11-05 RX ORDER — OXYCODONE HYDROCHLORIDE 10 MG/1
10 TABLET ORAL EVERY 4 HOURS PRN
Status: DISCONTINUED | OUTPATIENT
Start: 2020-11-04 | End: 2020-11-06 | Stop reason: HOSPADM

## 2020-11-05 RX ORDER — ONDANSETRON 2 MG/ML
4 INJECTION INTRAMUSCULAR; INTRAVENOUS EVERY 4 HOURS PRN
Status: DISCONTINUED | OUTPATIENT
Start: 2020-11-05 | End: 2020-11-06 | Stop reason: HOSPADM

## 2020-11-05 RX ORDER — DOXYCYCLINE HYCLATE 100 MG/1
100 CAPSULE ORAL EVERY 12 HOURS SCHEDULED
Status: DISCONTINUED | OUTPATIENT
Start: 2020-11-05 | End: 2020-11-06 | Stop reason: HOSPADM

## 2020-11-05 RX ORDER — IBUPROFEN 600 MG/1
600 TABLET ORAL EVERY 6 HOURS PRN
Status: DISCONTINUED | OUTPATIENT
Start: 2020-11-05 | End: 2020-11-06 | Stop reason: HOSPADM

## 2020-11-05 RX ADMIN — IBUPROFEN 600 MG: 600 TABLET ORAL at 09:49

## 2020-11-05 RX ADMIN — CEFOXITIN SODIUM 2000 MG: 1 POWDER, FOR SOLUTION INTRAVENOUS at 02:06

## 2020-11-05 RX ADMIN — DOXYCYCLINE 100 MG: 100 CAPSULE ORAL at 09:49

## 2020-11-05 RX ADMIN — OXYCODONE HYDROCHLORIDE 10 MG: 10 TABLET ORAL at 05:37

## 2020-11-05 RX ADMIN — IRON SUCROSE 200 MG: 20 INJECTION, SOLUTION INTRAVENOUS at 12:53

## 2020-11-05 RX ADMIN — DOXYCYCLINE 100 MG: 100 CAPSULE ORAL at 01:55

## 2020-11-05 RX ADMIN — ONDANSETRON 4 MG: 2 INJECTION INTRAMUSCULAR; INTRAVENOUS at 03:23

## 2020-11-05 RX ADMIN — OXYCODONE HYDROCHLORIDE 5 MG: 5 TABLET ORAL at 00:15

## 2020-11-05 RX ADMIN — CEFOXITIN SODIUM 2000 MG: 1 POWDER, FOR SOLUTION INTRAVENOUS at 09:49

## 2020-11-05 RX ADMIN — DOXYCYCLINE 100 MG: 100 CAPSULE ORAL at 20:19

## 2020-11-05 RX ADMIN — CEFOXITIN SODIUM 2000 MG: 1 POWDER, FOR SOLUTION INTRAVENOUS at 13:48

## 2020-11-05 RX ADMIN — SODIUM CHLORIDE, SODIUM LACTATE, POTASSIUM CHLORIDE, AND CALCIUM CHLORIDE 125 ML/HR: .6; .31; .03; .02 INJECTION, SOLUTION INTRAVENOUS at 05:47

## 2020-11-05 RX ADMIN — CEFOXITIN SODIUM 2000 MG: 1 POWDER, FOR SOLUTION INTRAVENOUS at 20:17

## 2020-11-06 VITALS
HEART RATE: 99 BPM | SYSTOLIC BLOOD PRESSURE: 123 MMHG | OXYGEN SATURATION: 98 % | TEMPERATURE: 98.8 F | RESPIRATION RATE: 18 BRPM | WEIGHT: 199.08 LBS | DIASTOLIC BLOOD PRESSURE: 70 MMHG | BODY MASS INDEX: 35.26 KG/M2

## 2020-11-06 LAB
BASOPHILS # BLD AUTO: 0.04 THOUSANDS/ΜL (ref 0–0.1)
BASOPHILS NFR BLD AUTO: 0 % (ref 0–1)
EOSINOPHIL # BLD AUTO: 0.06 THOUSAND/ΜL (ref 0–0.61)
EOSINOPHIL NFR BLD AUTO: 1 % (ref 0–6)
ERYTHROCYTE [DISTWIDTH] IN BLOOD BY AUTOMATED COUNT: 16.9 % (ref 11.6–15.1)
HCT VFR BLD AUTO: 29.7 % (ref 34.8–46.1)
HGB BLD-MCNC: 8.3 G/DL (ref 11.5–15.4)
IMM GRANULOCYTES # BLD AUTO: 0.04 THOUSAND/UL (ref 0–0.2)
IMM GRANULOCYTES NFR BLD AUTO: 0 % (ref 0–2)
LYMPHOCYTES # BLD AUTO: 1.82 THOUSANDS/ΜL (ref 0.6–4.47)
LYMPHOCYTES NFR BLD AUTO: 15 % (ref 14–44)
MCH RBC QN AUTO: 18.9 PG (ref 26.8–34.3)
MCHC RBC AUTO-ENTMCNC: 27.9 G/DL (ref 31.4–37.4)
MCV RBC AUTO: 68 FL (ref 82–98)
MONOCYTES # BLD AUTO: 0.73 THOUSAND/ΜL (ref 0.17–1.22)
MONOCYTES NFR BLD AUTO: 6 % (ref 4–12)
NEUTROPHILS # BLD AUTO: 9.23 THOUSANDS/ΜL (ref 1.85–7.62)
NEUTS SEG NFR BLD AUTO: 78 % (ref 43–75)
NRBC BLD AUTO-RTO: 0 /100 WBCS
PLATELET # BLD AUTO: 253 THOUSANDS/UL (ref 149–390)
PMV BLD AUTO: 11 FL (ref 8.9–12.7)
RBC # BLD AUTO: 4.39 MILLION/UL (ref 3.81–5.12)
WBC # BLD AUTO: 11.92 THOUSAND/UL (ref 4.31–10.16)

## 2020-11-06 PROCEDURE — NC001 PR NO CHARGE: Performed by: OBSTETRICS & GYNECOLOGY

## 2020-11-06 PROCEDURE — 99238 HOSP IP/OBS DSCHRG MGMT 30/<: CPT | Performed by: OBSTETRICS & GYNECOLOGY

## 2020-11-06 PROCEDURE — 85025 COMPLETE CBC W/AUTO DIFF WBC: CPT | Performed by: OBSTETRICS & GYNECOLOGY

## 2020-11-06 RX ADMIN — CEFOXITIN SODIUM 2000 MG: 1 POWDER, FOR SOLUTION INTRAVENOUS at 09:00

## 2020-11-06 RX ADMIN — IBUPROFEN 600 MG: 600 TABLET ORAL at 10:06

## 2020-11-06 RX ADMIN — DOXYCYCLINE 100 MG: 100 CAPSULE ORAL at 09:00

## 2020-11-06 RX ADMIN — SODIUM CHLORIDE, SODIUM LACTATE, POTASSIUM CHLORIDE, AND CALCIUM CHLORIDE 125 ML/HR: .6; .31; .03; .02 INJECTION, SOLUTION INTRAVENOUS at 01:59

## 2020-11-06 RX ADMIN — IRON SUCROSE 200 MG: 20 INJECTION, SOLUTION INTRAVENOUS at 10:02

## 2020-11-06 RX ADMIN — OXYCODONE HYDROCHLORIDE 10 MG: 10 TABLET ORAL at 00:36

## 2020-11-06 RX ADMIN — CEFOXITIN SODIUM 2000 MG: 1 POWDER, FOR SOLUTION INTRAVENOUS at 01:59

## 2020-11-09 LAB
BACTERIA BLD CULT: NORMAL
BACTERIA BLD CULT: NORMAL

## 2020-11-13 ENCOUNTER — OFFICE VISIT (OUTPATIENT)
Dept: OBGYN CLINIC | Facility: CLINIC | Age: 30
End: 2020-11-13

## 2020-11-13 ENCOUNTER — TELEPHONE (OUTPATIENT)
Dept: OTHER | Facility: OTHER | Age: 30
End: 2020-11-13

## 2020-11-13 VITALS
TEMPERATURE: 98.6 F | HEART RATE: 74 BPM | DIASTOLIC BLOOD PRESSURE: 87 MMHG | SYSTOLIC BLOOD PRESSURE: 114 MMHG | WEIGHT: 196.8 LBS | BODY MASS INDEX: 34.86 KG/M2

## 2020-11-13 DIAGNOSIS — N80.9 ENDOMETRIOSIS: Primary | ICD-10-CM

## 2020-11-13 DIAGNOSIS — N73.9 PID (PELVIC INFLAMMATORY DISEASE): ICD-10-CM

## 2020-11-13 PROCEDURE — 99204 OFFICE O/P NEW MOD 45 MIN: CPT | Performed by: STUDENT IN AN ORGANIZED HEALTH CARE EDUCATION/TRAINING PROGRAM

## 2020-11-13 RX ORDER — NORETHINDRONE ACETATE AND ETHINYL ESTRADIOL 1MG-20(21)
1 KIT ORAL DAILY
Qty: 28 TABLET | Refills: 5 | Status: SHIPPED | OUTPATIENT
Start: 2020-11-13 | End: 2021-02-24 | Stop reason: HOSPADM

## 2020-11-13 RX ORDER — DOXYCYCLINE 100 MG/1
100 TABLET ORAL 2 TIMES DAILY
Qty: 28 TABLET | Refills: 0 | Status: SHIPPED | OUTPATIENT
Start: 2020-11-13 | End: 2020-11-27

## 2020-11-13 RX ORDER — METRONIDAZOLE 500 MG/1
500 TABLET ORAL 2 TIMES DAILY
Qty: 28 TABLET | Refills: 0 | Status: SHIPPED | OUTPATIENT
Start: 2020-11-13 | End: 2020-11-27

## 2020-12-01 ENCOUNTER — TELEPHONE (OUTPATIENT)
Dept: OBGYN CLINIC | Facility: CLINIC | Age: 30
End: 2020-12-01

## 2020-12-04 ENCOUNTER — OFFICE VISIT (OUTPATIENT)
Dept: OBGYN CLINIC | Facility: CLINIC | Age: 30
End: 2020-12-04

## 2020-12-04 VITALS
WEIGHT: 196 LBS | DIASTOLIC BLOOD PRESSURE: 70 MMHG | SYSTOLIC BLOOD PRESSURE: 143 MMHG | BODY MASS INDEX: 34.72 KG/M2 | HEART RATE: 90 BPM

## 2020-12-04 DIAGNOSIS — R10.2 PELVIC PAIN: Primary | ICD-10-CM

## 2020-12-04 PROCEDURE — 99214 OFFICE O/P EST MOD 30 MIN: CPT | Performed by: STUDENT IN AN ORGANIZED HEALTH CARE EDUCATION/TRAINING PROGRAM

## 2020-12-05 ENCOUNTER — HOSPITAL ENCOUNTER (EMERGENCY)
Facility: HOSPITAL | Age: 30
Discharge: HOME | End: 2020-12-05
Attending: EMERGENCY MEDICINE
Payer: COMMERCIAL

## 2020-12-05 ENCOUNTER — HOSPITAL ENCOUNTER (OUTPATIENT)
Dept: ULTRASOUND IMAGING | Facility: HOSPITAL | Age: 30
Discharge: HOME | End: 2020-12-05
Attending: STUDENT IN AN ORGANIZED HEALTH CARE EDUCATION/TRAINING PROGRAM
Payer: COMMERCIAL

## 2020-12-05 VITALS
RESPIRATION RATE: 16 BRPM | HEART RATE: 89 BPM | BODY MASS INDEX: 34.72 KG/M2 | SYSTOLIC BLOOD PRESSURE: 126 MMHG | WEIGHT: 196 LBS | DIASTOLIC BLOOD PRESSURE: 74 MMHG | TEMPERATURE: 98.1 F | OXYGEN SATURATION: 100 %

## 2020-12-05 DIAGNOSIS — N83.201 HEMORRHAGIC CYST OF RIGHT OVARY: ICD-10-CM

## 2020-12-05 DIAGNOSIS — N94.89 ADNEXAL MASS: Primary | ICD-10-CM

## 2020-12-05 DIAGNOSIS — N70.11 HYDROSALPINX: ICD-10-CM

## 2020-12-05 DIAGNOSIS — R10.2 PELVIC PAIN: ICD-10-CM

## 2020-12-05 LAB
ANION GAP SERPL CALCULATED.3IONS-SCNC: 12 MMOL/L (ref 4–13)
BASOPHILS # BLD AUTO: 0.03 THOUSANDS/ΜL (ref 0–0.1)
BASOPHILS NFR BLD AUTO: 0 % (ref 0–1)
BUN SERPL-MCNC: 8 MG/DL (ref 5–25)
CALCIUM SERPL-MCNC: 8.7 MG/DL (ref 8.3–10.1)
CHLORIDE SERPL-SCNC: 103 MMOL/L (ref 100–108)
CO2 SERPL-SCNC: 22 MMOL/L (ref 21–32)
CREAT SERPL-MCNC: 0.65 MG/DL (ref 0.6–1.3)
EOSINOPHIL # BLD AUTO: 0.05 THOUSAND/ΜL (ref 0–0.61)
EOSINOPHIL NFR BLD AUTO: 1 % (ref 0–6)
ERYTHROCYTE [DISTWIDTH] IN BLOOD BY AUTOMATED COUNT: 22.8 % (ref 11.6–15.1)
GFR SERPL CREATININE-BSD FRML MDRD: 119 ML/MIN/1.73SQ M
GLUCOSE SERPL-MCNC: 87 MG/DL (ref 65–140)
HCT VFR BLD AUTO: 36.2 % (ref 34.8–46.1)
HGB BLD-MCNC: 10.8 G/DL (ref 11.5–15.4)
IMM GRANULOCYTES # BLD AUTO: 0.04 THOUSAND/UL (ref 0–0.2)
IMM GRANULOCYTES NFR BLD AUTO: 0 % (ref 0–2)
LYMPHOCYTES # BLD AUTO: 1.69 THOUSANDS/ΜL (ref 0.6–4.47)
LYMPHOCYTES NFR BLD AUTO: 16 % (ref 14–44)
MCH RBC QN AUTO: 21 PG (ref 26.8–34.3)
MCHC RBC AUTO-ENTMCNC: 29.8 G/DL (ref 31.4–37.4)
MCV RBC AUTO: 70 FL (ref 82–98)
MONOCYTES # BLD AUTO: 0.63 THOUSAND/ΜL (ref 0.17–1.22)
MONOCYTES NFR BLD AUTO: 6 % (ref 4–12)
NEUTROPHILS # BLD AUTO: 8.22 THOUSANDS/ΜL (ref 1.85–7.62)
NEUTS SEG NFR BLD AUTO: 77 % (ref 43–75)
NRBC BLD AUTO-RTO: 0 /100 WBCS
PLATELET # BLD AUTO: 268 THOUSANDS/UL (ref 149–390)
PMV BLD AUTO: 11.5 FL (ref 8.9–12.7)
POTASSIUM SERPL-SCNC: 3.2 MMOL/L (ref 3.5–5.3)
RBC # BLD AUTO: 5.15 MILLION/UL (ref 3.81–5.12)
SODIUM SERPL-SCNC: 137 MMOL/L (ref 136–145)
WBC # BLD AUTO: 10.66 THOUSAND/UL (ref 4.31–10.16)

## 2020-12-05 PROCEDURE — 96374 THER/PROPH/DIAG INJ IV PUSH: CPT

## 2020-12-05 PROCEDURE — 99244 OFF/OP CNSLTJ NEW/EST MOD 40: CPT | Performed by: OBSTETRICS & GYNECOLOGY

## 2020-12-05 PROCEDURE — 85025 COMPLETE CBC W/AUTO DIFF WBC: CPT | Performed by: EMERGENCY MEDICINE

## 2020-12-05 PROCEDURE — 80048 BASIC METABOLIC PNL TOTAL CA: CPT | Performed by: EMERGENCY MEDICINE

## 2020-12-05 PROCEDURE — 96376 TX/PRO/DX INJ SAME DRUG ADON: CPT

## 2020-12-05 PROCEDURE — 99284 EMERGENCY DEPT VISIT MOD MDM: CPT

## 2020-12-05 PROCEDURE — 36415 COLL VENOUS BLD VENIPUNCTURE: CPT | Performed by: EMERGENCY MEDICINE

## 2020-12-05 PROCEDURE — 76830 TRANSVAGINAL US NON-OB: CPT

## 2020-12-05 PROCEDURE — 76856 US EXAM PELVIC COMPLETE: CPT

## 2020-12-05 PROCEDURE — 96375 TX/PRO/DX INJ NEW DRUG ADDON: CPT

## 2020-12-05 PROCEDURE — 99285 EMERGENCY DEPT VISIT HI MDM: CPT | Performed by: EMERGENCY MEDICINE

## 2020-12-05 RX ORDER — MORPHINE SULFATE 4 MG/ML
4 INJECTION, SOLUTION INTRAMUSCULAR; INTRAVENOUS ONCE
Status: COMPLETED | OUTPATIENT
Start: 2020-12-05 | End: 2020-12-05

## 2020-12-05 RX ORDER — OXYCODONE HYDROCHLORIDE 5 MG/1
5 TABLET ORAL EVERY 4 HOURS PRN
Qty: 9 TABLET | Refills: 0 | Status: SHIPPED | OUTPATIENT
Start: 2020-12-05 | End: 2020-12-08

## 2020-12-05 RX ORDER — CHLORHEXIDINE GLUCONATE 4 G/100ML
2 SOLUTION TOPICAL DAILY PRN
Qty: 120 ML | Refills: 0 | Status: SHIPPED | OUTPATIENT
Start: 2020-12-05 | End: 2020-12-07

## 2020-12-05 RX ORDER — KETOROLAC TROMETHAMINE 30 MG/ML
15 INJECTION, SOLUTION INTRAMUSCULAR; INTRAVENOUS ONCE
Status: COMPLETED | OUTPATIENT
Start: 2020-12-05 | End: 2020-12-05

## 2020-12-05 RX ADMIN — MORPHINE SULFATE 4 MG: 4 INJECTION INTRAVENOUS at 13:13

## 2020-12-05 RX ADMIN — KETOROLAC TROMETHAMINE 15 MG: 30 INJECTION, SOLUTION INTRAMUSCULAR at 13:09

## 2020-12-05 RX ADMIN — MORPHINE SULFATE 4 MG: 4 INJECTION INTRAVENOUS at 17:03

## 2020-12-07 ENCOUNTER — ANESTHESIA (OUTPATIENT)
Dept: PERIOP | Facility: HOSPITAL | Age: 30
End: 2020-12-07
Payer: COMMERCIAL

## 2020-12-07 ENCOUNTER — ANESTHESIA EVENT (OUTPATIENT)
Dept: PERIOP | Facility: HOSPITAL | Age: 30
End: 2020-12-07
Payer: COMMERCIAL

## 2020-12-07 ENCOUNTER — HOSPITAL ENCOUNTER (OUTPATIENT)
Facility: HOSPITAL | Age: 30
Setting detail: OUTPATIENT SURGERY
Discharge: HOME | End: 2020-12-07
Attending: OBSTETRICS & GYNECOLOGY | Admitting: OBSTETRICS & GYNECOLOGY
Payer: COMMERCIAL

## 2020-12-07 VITALS
BODY MASS INDEX: 34.73 KG/M2 | HEIGHT: 63 IN | SYSTOLIC BLOOD PRESSURE: 148 MMHG | OXYGEN SATURATION: 97 % | WEIGHT: 196 LBS | HEART RATE: 95 BPM | RESPIRATION RATE: 18 BRPM | DIASTOLIC BLOOD PRESSURE: 89 MMHG | TEMPERATURE: 99.7 F

## 2020-12-07 VITALS — HEART RATE: 112 BPM

## 2020-12-07 DIAGNOSIS — N70.11 HYDROSALPINX: ICD-10-CM

## 2020-12-07 DIAGNOSIS — N83.201 HEMORRHAGIC CYST OF RIGHT OVARY: ICD-10-CM

## 2020-12-07 DIAGNOSIS — R10.2 PELVIC PAIN: ICD-10-CM

## 2020-12-07 DIAGNOSIS — N94.89 ADNEXAL MASS: ICD-10-CM

## 2020-12-07 PROBLEM — N80.9 ENDOMETRIOSIS: Status: ACTIVE | Noted: 2020-12-07

## 2020-12-07 LAB
EXT PREGNANCY TEST URINE: NEGATIVE
EXT. CONTROL: NORMAL

## 2020-12-07 PROCEDURE — 88305 TISSUE EXAM BY PATHOLOGIST: CPT | Performed by: PATHOLOGY

## 2020-12-07 PROCEDURE — 81025 URINE PREGNANCY TEST: CPT | Performed by: STUDENT IN AN ORGANIZED HEALTH CARE EDUCATION/TRAINING PROGRAM

## 2020-12-07 PROCEDURE — 58661 LAPAROSCOPY REMOVE ADNEXA: CPT | Performed by: OBSTETRICS & GYNECOLOGY

## 2020-12-07 PROCEDURE — 99214 OFFICE O/P EST MOD 30 MIN: CPT | Performed by: OBSTETRICS & GYNECOLOGY

## 2020-12-07 RX ORDER — CEFAZOLIN SODIUM 2 G/50ML
2000 SOLUTION INTRAVENOUS ONCE
Status: COMPLETED | OUTPATIENT
Start: 2020-12-07 | End: 2020-12-07

## 2020-12-07 RX ORDER — PROPOFOL 10 MG/ML
INJECTION, EMULSION INTRAVENOUS AS NEEDED
Status: DISCONTINUED | OUTPATIENT
Start: 2020-12-07 | End: 2020-12-07

## 2020-12-07 RX ORDER — SODIUM CHLORIDE 9 MG/ML
125 INJECTION, SOLUTION INTRAVENOUS CONTINUOUS
Status: DISCONTINUED | OUTPATIENT
Start: 2020-12-07 | End: 2020-12-07 | Stop reason: HOSPADM

## 2020-12-07 RX ORDER — IBUPROFEN 600 MG/1
600 TABLET ORAL EVERY 6 HOURS PRN
Status: DISCONTINUED | OUTPATIENT
Start: 2020-12-07 | End: 2020-12-07 | Stop reason: HOSPADM

## 2020-12-07 RX ORDER — MIDAZOLAM HYDROCHLORIDE 2 MG/2ML
INJECTION, SOLUTION INTRAMUSCULAR; INTRAVENOUS AS NEEDED
Status: DISCONTINUED | OUTPATIENT
Start: 2020-12-07 | End: 2020-12-07

## 2020-12-07 RX ORDER — OXYCODONE HYDROCHLORIDE 5 MG/1
10 TABLET ORAL EVERY 4 HOURS PRN
Status: DISCONTINUED | OUTPATIENT
Start: 2020-12-07 | End: 2020-12-07 | Stop reason: HOSPADM

## 2020-12-07 RX ORDER — OXYCODONE HYDROCHLORIDE 5 MG/1
5 TABLET ORAL EVERY 4 HOURS PRN
Status: DISCONTINUED | OUTPATIENT
Start: 2020-12-07 | End: 2020-12-07 | Stop reason: HOSPADM

## 2020-12-07 RX ORDER — KETOROLAC TROMETHAMINE 30 MG/ML
INJECTION, SOLUTION INTRAMUSCULAR; INTRAVENOUS AS NEEDED
Status: DISCONTINUED | OUTPATIENT
Start: 2020-12-07 | End: 2020-12-07

## 2020-12-07 RX ORDER — MEPERIDINE HYDROCHLORIDE 25 MG/ML
12.5 INJECTION INTRAMUSCULAR; INTRAVENOUS; SUBCUTANEOUS
Status: DISCONTINUED | OUTPATIENT
Start: 2020-12-07 | End: 2020-12-07 | Stop reason: HOSPADM

## 2020-12-07 RX ORDER — MAGNESIUM HYDROXIDE 1200 MG/15ML
LIQUID ORAL AS NEEDED
Status: DISCONTINUED | OUTPATIENT
Start: 2020-12-07 | End: 2020-12-07 | Stop reason: HOSPADM

## 2020-12-07 RX ORDER — ONDANSETRON 2 MG/ML
4 INJECTION INTRAMUSCULAR; INTRAVENOUS ONCE AS NEEDED
Status: COMPLETED | OUTPATIENT
Start: 2020-12-07 | End: 2020-12-07

## 2020-12-07 RX ORDER — FENTANYL CITRATE 50 UG/ML
INJECTION, SOLUTION INTRAMUSCULAR; INTRAVENOUS AS NEEDED
Status: DISCONTINUED | OUTPATIENT
Start: 2020-12-07 | End: 2020-12-07

## 2020-12-07 RX ORDER — LIDOCAINE HYDROCHLORIDE 10 MG/ML
INJECTION, SOLUTION EPIDURAL; INFILTRATION; INTRACAUDAL; PERINEURAL AS NEEDED
Status: DISCONTINUED | OUTPATIENT
Start: 2020-12-07 | End: 2020-12-07

## 2020-12-07 RX ORDER — ONDANSETRON 2 MG/ML
INJECTION INTRAMUSCULAR; INTRAVENOUS AS NEEDED
Status: DISCONTINUED | OUTPATIENT
Start: 2020-12-07 | End: 2020-12-07

## 2020-12-07 RX ORDER — ACETAMINOPHEN 325 MG/1
650 TABLET ORAL EVERY 6 HOURS PRN
Status: DISCONTINUED | OUTPATIENT
Start: 2020-12-07 | End: 2020-12-07 | Stop reason: HOSPADM

## 2020-12-07 RX ORDER — ROCURONIUM BROMIDE 10 MG/ML
INJECTION, SOLUTION INTRAVENOUS AS NEEDED
Status: DISCONTINUED | OUTPATIENT
Start: 2020-12-07 | End: 2020-12-07

## 2020-12-07 RX ORDER — OXYCODONE HYDROCHLORIDE AND ACETAMINOPHEN 5; 325 MG/1; MG/1
1 TABLET ORAL EVERY 6 HOURS PRN
Qty: 10 TABLET | Refills: 0 | Status: SHIPPED | OUTPATIENT
Start: 2020-12-07 | End: 2021-02-24 | Stop reason: HOSPADM

## 2020-12-07 RX ORDER — ONDANSETRON 2 MG/ML
4 INJECTION INTRAMUSCULAR; INTRAVENOUS EVERY 6 HOURS PRN
Status: DISCONTINUED | OUTPATIENT
Start: 2020-12-07 | End: 2020-12-07 | Stop reason: HOSPADM

## 2020-12-07 RX ORDER — HYDROMORPHONE HCL/PF 1 MG/ML
0.5 SYRINGE (ML) INJECTION
Status: DISCONTINUED | OUTPATIENT
Start: 2020-12-07 | End: 2020-12-07 | Stop reason: HOSPADM

## 2020-12-07 RX ORDER — DEXAMETHASONE SODIUM PHOSPHATE 10 MG/ML
INJECTION, SOLUTION INTRAMUSCULAR; INTRAVENOUS AS NEEDED
Status: DISCONTINUED | OUTPATIENT
Start: 2020-12-07 | End: 2020-12-07

## 2020-12-07 RX ORDER — FENTANYL CITRATE/PF 50 MCG/ML
25 SYRINGE (ML) INJECTION
Status: DISCONTINUED | OUTPATIENT
Start: 2020-12-07 | End: 2020-12-07

## 2020-12-07 RX ADMIN — SODIUM CHLORIDE: 0.9 INJECTION, SOLUTION INTRAVENOUS at 11:28

## 2020-12-07 RX ADMIN — DEXAMETHASONE SODIUM PHOSPHATE 8 MG: 10 INJECTION, SOLUTION INTRAMUSCULAR; INTRAVENOUS at 11:35

## 2020-12-07 RX ADMIN — CEFAZOLIN SODIUM 2000 MG: 2 SOLUTION INTRAVENOUS at 11:23

## 2020-12-07 RX ADMIN — HYDROMORPHONE HYDROCHLORIDE 0.5 MG: 1 INJECTION, SOLUTION INTRAMUSCULAR; INTRAVENOUS; SUBCUTANEOUS at 13:47

## 2020-12-07 RX ADMIN — PROPOFOL 200 MG: 10 INJECTION, EMULSION INTRAVENOUS at 11:35

## 2020-12-07 RX ADMIN — SODIUM CHLORIDE: 0.9 INJECTION, SOLUTION INTRAVENOUS at 12:03

## 2020-12-07 RX ADMIN — LIDOCAINE HYDROCHLORIDE 100 MG: 10 INJECTION, SOLUTION EPIDURAL; INFILTRATION; INTRACAUDAL; PERINEURAL at 11:35

## 2020-12-07 RX ADMIN — MIDAZOLAM 4 MG: 1 INJECTION INTRAMUSCULAR; INTRAVENOUS at 11:31

## 2020-12-07 RX ADMIN — FENTANYL CITRATE 50 MCG: 50 INJECTION, SOLUTION INTRAMUSCULAR; INTRAVENOUS at 11:47

## 2020-12-07 RX ADMIN — ROCURONIUM BROMIDE 30 MG: 10 INJECTION, SOLUTION INTRAVENOUS at 11:35

## 2020-12-07 RX ADMIN — ONDANSETRON 4 MG: 2 INJECTION INTRAMUSCULAR; INTRAVENOUS at 13:29

## 2020-12-07 RX ADMIN — FENTANYL CITRATE 50 MCG: 50 INJECTION, SOLUTION INTRAMUSCULAR; INTRAVENOUS at 12:18

## 2020-12-07 RX ADMIN — ACETAMINOPHEN 650 MG: 325 TABLET ORAL at 14:25

## 2020-12-07 RX ADMIN — ONDANSETRON 4 MG: 2 INJECTION INTRAMUSCULAR; INTRAVENOUS at 11:35

## 2020-12-07 RX ADMIN — SODIUM CHLORIDE 125 ML/HR: 0.9 INJECTION, SOLUTION INTRAVENOUS at 13:42

## 2020-12-07 RX ADMIN — KETOROLAC TROMETHAMINE 30 MG: 30 INJECTION, SOLUTION INTRAMUSCULAR at 12:56

## 2020-12-07 RX ADMIN — FENTANYL CITRATE 50 MCG: 50 INJECTION, SOLUTION INTRAMUSCULAR; INTRAVENOUS at 11:35

## 2020-12-07 RX ADMIN — FENTANYL CITRATE 50 MCG: 50 INJECTION, SOLUTION INTRAMUSCULAR; INTRAVENOUS at 12:49

## 2020-12-08 ENCOUNTER — HOSPITAL ENCOUNTER (EMERGENCY)
Facility: HOSPITAL | Age: 30
Discharge: HOME | End: 2020-12-08
Attending: EMERGENCY MEDICINE | Admitting: EMERGENCY MEDICINE
Payer: COMMERCIAL

## 2020-12-08 ENCOUNTER — TELEPHONE (OUTPATIENT)
Dept: OBGYN CLINIC | Facility: CLINIC | Age: 30
End: 2020-12-08

## 2020-12-08 VITALS
OXYGEN SATURATION: 98 % | SYSTOLIC BLOOD PRESSURE: 127 MMHG | TEMPERATURE: 99.2 F | RESPIRATION RATE: 18 BRPM | HEART RATE: 125 BPM | BODY MASS INDEX: 35.66 KG/M2 | DIASTOLIC BLOOD PRESSURE: 93 MMHG | WEIGHT: 201.28 LBS

## 2020-12-08 DIAGNOSIS — G89.18 POST-OPERATIVE PAIN: Primary | ICD-10-CM

## 2020-12-08 DIAGNOSIS — R10.9 ABDOMINAL PAIN: ICD-10-CM

## 2020-12-08 LAB
ATRIAL RATE: 128 BPM
BACTERIA UR QL AUTO: ABNORMAL /HPF
BILIRUB UR QL STRIP: NEGATIVE
CLARITY UR: ABNORMAL
COLOR UR: ABNORMAL
EXT PREG TEST URINE: NEGATIVE
EXT. CONTROL ED NAV: NORMAL
GLUCOSE UR STRIP-MCNC: ABNORMAL MG/DL
HGB UR QL STRIP.AUTO: ABNORMAL
KETONES UR STRIP-MCNC: ABNORMAL MG/DL
LEUKOCYTE ESTERASE UR QL STRIP: ABNORMAL
NITRITE UR QL STRIP: POSITIVE
NON-SQ EPI CELLS URNS QL MICRO: ABNORMAL /HPF
P AXIS: 73 DEGREES
PH UR STRIP.AUTO: 8 [PH]
PR INTERVAL: 146 MS
PROT UR STRIP-MCNC: ABNORMAL MG/DL
QRS AXIS: 74 DEGREES
QRSD INTERVAL: 86 MS
QT INTERVAL: 280 MS
QTC INTERVAL: 408 MS
RBC #/AREA URNS AUTO: ABNORMAL /HPF
SP GR UR STRIP.AUTO: 1.02 (ref 1–1.03)
T WAVE AXIS: 0 DEGREES
UROBILINOGEN UR QL STRIP.AUTO: 4 E.U./DL
VENTRICULAR RATE: 128 BPM
WBC #/AREA URNS AUTO: ABNORMAL /HPF

## 2020-12-08 PROCEDURE — 99285 EMERGENCY DEPT VISIT HI MDM: CPT

## 2020-12-08 PROCEDURE — 81025 URINE PREGNANCY TEST: CPT | Performed by: EMERGENCY MEDICINE

## 2020-12-08 PROCEDURE — 93005 ELECTROCARDIOGRAM TRACING: CPT

## 2020-12-08 PROCEDURE — 99285 EMERGENCY DEPT VISIT HI MDM: CPT | Performed by: EMERGENCY MEDICINE

## 2020-12-08 PROCEDURE — 93010 ELECTROCARDIOGRAM REPORT: CPT

## 2020-12-08 PROCEDURE — 81001 URINALYSIS AUTO W/SCOPE: CPT | Performed by: EMERGENCY MEDICINE

## 2020-12-08 RX ORDER — ONDANSETRON 2 MG/ML
4 INJECTION INTRAMUSCULAR; INTRAVENOUS ONCE
Status: DISCONTINUED | OUTPATIENT
Start: 2020-12-08 | End: 2020-12-08 | Stop reason: HOSPADM

## 2020-12-08 RX ORDER — MORPHINE SULFATE 4 MG/ML
4 INJECTION, SOLUTION INTRAMUSCULAR; INTRAVENOUS ONCE
Status: DISCONTINUED | OUTPATIENT
Start: 2020-12-08 | End: 2020-12-08 | Stop reason: HOSPADM

## 2020-12-10 ENCOUNTER — TELEPHONE (OUTPATIENT)
Dept: GYNECOLOGY | Facility: CLINIC | Age: 30
End: 2020-12-10

## 2020-12-10 ENCOUNTER — TELEPHONE (OUTPATIENT)
Dept: OBGYN CLINIC | Facility: CLINIC | Age: 30
End: 2020-12-10

## 2020-12-11 ENCOUNTER — DOCUMENTATION (OUTPATIENT)
Dept: GYNECOLOGY | Facility: CLINIC | Age: 30
End: 2020-12-11

## 2020-12-11 ENCOUNTER — OFFICE VISIT (OUTPATIENT)
Dept: OBGYN CLINIC | Facility: CLINIC | Age: 30
End: 2020-12-11

## 2020-12-11 VITALS
BODY MASS INDEX: 33.48 KG/M2 | DIASTOLIC BLOOD PRESSURE: 93 MMHG | WEIGHT: 189 LBS | HEART RATE: 93 BPM | SYSTOLIC BLOOD PRESSURE: 142 MMHG

## 2020-12-11 DIAGNOSIS — Z48.89 ENCOUNTER FOR POSTOPERATIVE WOUND CHECK: Primary | ICD-10-CM

## 2020-12-11 PROCEDURE — 99024 POSTOP FOLLOW-UP VISIT: CPT | Performed by: STUDENT IN AN ORGANIZED HEALTH CARE EDUCATION/TRAINING PROGRAM

## 2020-12-28 ENCOUNTER — TELEPHONE (OUTPATIENT)
Dept: OBGYN CLINIC | Facility: CLINIC | Age: 30
End: 2020-12-28

## 2021-01-14 ENCOUNTER — OFFICE VISIT (OUTPATIENT)
Dept: OBGYN CLINIC | Facility: CLINIC | Age: 31
End: 2021-01-14

## 2021-01-14 VITALS
SYSTOLIC BLOOD PRESSURE: 120 MMHG | HEART RATE: 100 BPM | WEIGHT: 194 LBS | BODY MASS INDEX: 34.37 KG/M2 | DIASTOLIC BLOOD PRESSURE: 79 MMHG

## 2021-01-14 DIAGNOSIS — Z48.89 ENCOUNTER FOR POSTOPERATIVE WOUND CHECK: Primary | ICD-10-CM

## 2021-01-14 PROCEDURE — 99024 POSTOP FOLLOW-UP VISIT: CPT | Performed by: STUDENT IN AN ORGANIZED HEALTH CARE EDUCATION/TRAINING PROGRAM

## 2021-01-14 NOTE — PROGRESS NOTES
Urogynecology - Follow-up clinic note  Ann Smith   [id number]    Kiswahili-speaking: no Phone  used: no    Chief complaint: Post Op Check    HPI: 31 y o  [id number] presents as post op from Diagnostic Laparoscopy, Right Salpingoophorectomy, Left ovarian cystectomy, cystoscopy on 12/7/20  Patient reports feeling very well  Denies any pain during menses  States that her only symptom during menses was bloating and denies any heavy vaginal bleeding  Ambulating without difficulty, denies any voiding or defecation dysfunction  Medical history and medication list reviewed and no significant changes since last visit  yes      Physical exam:  Abdomen: soft, non-tender, without masses or organomegaly Trocar incisions clean, dry and intact, well healed  Pelvic: BUS normal  Introitus normal  Normal appearing vaginal epithelium  Extremities: No edema, No calf tenderness and No venous stasis  Neurologic:  alert, oriented, normal speech, no focal findings or movement disorder noted   Vulvovaginal atrophy:  no none  Urethral caruncle:  no none      Assessment:  31 y o  with history of Endometriosis s/p  Diagnostic Laparoscopy, Right Salpingoophorectomy, Left ovarian cystectomy, cystoscopy on 12/7/20 doing well    Plan:  -Patient doing well, physical restrictions lifted for patient  -Pelvic rest restrictions lifted for patient  -Patient desires future fertility, in the setting of severe endometriosis, patient advised to follow up with [facility] specialist for fertility consultation    -Information to Dr Norma Lira given to patient     Follow up when clinically indicated      Kat Joyce MD

## 2021-02-22 ENCOUNTER — APPOINTMENT (EMERGENCY)
Dept: ULTRASOUND IMAGING | Facility: HOSPITAL | Age: 31
DRG: 532 | End: 2021-02-22
Payer: COMMERCIAL

## 2021-02-22 ENCOUNTER — APPOINTMENT (EMERGENCY)
Dept: CT IMAGING | Facility: HOSPITAL | Age: 31
DRG: 532 | End: 2021-02-22
Payer: COMMERCIAL

## 2021-02-22 ENCOUNTER — HOSPITAL ENCOUNTER (EMERGENCY)
Facility: HOSPITAL | Age: 31
Discharge: HOME | DRG: 532 | End: 2021-02-22
Admitting: EMERGENCY MEDICINE
Payer: COMMERCIAL

## 2021-02-22 VITALS
DIASTOLIC BLOOD PRESSURE: 89 MMHG | HEART RATE: 90 BPM | TEMPERATURE: 97.9 F | OXYGEN SATURATION: 100 % | RESPIRATION RATE: 16 BRPM | SYSTOLIC BLOOD PRESSURE: 128 MMHG

## 2021-02-22 DIAGNOSIS — D64.9 ANEMIA: ICD-10-CM

## 2021-02-22 DIAGNOSIS — K42.9 UMBILICAL HERNIA: ICD-10-CM

## 2021-02-22 DIAGNOSIS — N80.9 ENDOMETRIOMA: ICD-10-CM

## 2021-02-22 DIAGNOSIS — N83.202 LEFT OVARIAN CYST: Primary | ICD-10-CM

## 2021-02-22 DIAGNOSIS — D21.9 LEIOMYOMA: ICD-10-CM

## 2021-02-22 DIAGNOSIS — E87.6 HYPOKALEMIA: ICD-10-CM

## 2021-02-22 LAB
ANION GAP SERPL CALCULATED.3IONS-SCNC: 9 MMOL/L (ref 4–13)
BACTERIA UR QL AUTO: ABNORMAL /HPF
BASOPHILS # BLD AUTO: 0.04 THOUSANDS/ΜL (ref 0–0.1)
BASOPHILS NFR BLD AUTO: 0 % (ref 0–1)
BILIRUB UR QL STRIP: NEGATIVE
BUN SERPL-MCNC: 5 MG/DL (ref 5–25)
CALCIUM SERPL-MCNC: 8.4 MG/DL (ref 8.3–10.1)
CHLORIDE SERPL-SCNC: 104 MMOL/L (ref 100–108)
CLARITY UR: CLEAR
CO2 SERPL-SCNC: 24 MMOL/L (ref 21–32)
COLOR UR: YELLOW
CREAT SERPL-MCNC: 0.57 MG/DL (ref 0.6–1.3)
EOSINOPHIL # BLD AUTO: 0.12 THOUSAND/ΜL (ref 0–0.61)
EOSINOPHIL NFR BLD AUTO: 1 % (ref 0–6)
ERYTHROCYTE [DISTWIDTH] IN BLOOD BY AUTOMATED COUNT: 15.9 % (ref 11.6–15.1)
EXT PREG TEST URINE: NEGATIVE
EXT. CONTROL ED NAV: NORMAL
GFR SERPL CREATININE-BSD FRML MDRD: 124 ML/MIN/1.73SQ M
GLUCOSE SERPL-MCNC: 105 MG/DL (ref 65–140)
GLUCOSE UR STRIP-MCNC: NEGATIVE MG/DL
HCT VFR BLD AUTO: 34.6 % (ref 34.8–46.1)
HGB BLD-MCNC: 9.9 G/DL (ref 11.5–15.4)
HGB UR QL STRIP.AUTO: ABNORMAL
IMM GRANULOCYTES # BLD AUTO: 0.03 THOUSAND/UL (ref 0–0.2)
IMM GRANULOCYTES NFR BLD AUTO: 0 % (ref 0–2)
KETONES UR STRIP-MCNC: NEGATIVE MG/DL
LEUKOCYTE ESTERASE UR QL STRIP: ABNORMAL
LYMPHOCYTES # BLD AUTO: 2.79 THOUSANDS/ΜL (ref 0.6–4.47)
LYMPHOCYTES NFR BLD AUTO: 31 % (ref 14–44)
MCH RBC QN AUTO: 20.1 PG (ref 26.8–34.3)
MCHC RBC AUTO-ENTMCNC: 28.6 G/DL (ref 31.4–37.4)
MCV RBC AUTO: 70 FL (ref 82–98)
MONOCYTES # BLD AUTO: 0.65 THOUSAND/ΜL (ref 0.17–1.22)
MONOCYTES NFR BLD AUTO: 7 % (ref 4–12)
NEUTROPHILS # BLD AUTO: 5.46 THOUSANDS/ΜL (ref 1.85–7.62)
NEUTS SEG NFR BLD AUTO: 61 % (ref 43–75)
NITRITE UR QL STRIP: NEGATIVE
NON-SQ EPI CELLS URNS QL MICRO: ABNORMAL /HPF
NRBC BLD AUTO-RTO: 0 /100 WBCS
PH UR STRIP.AUTO: 6.5 [PH] (ref 4.5–8)
PLATELET # BLD AUTO: 390 THOUSANDS/UL (ref 149–390)
PMV BLD AUTO: 11.4 FL (ref 8.9–12.7)
POTASSIUM SERPL-SCNC: 3.3 MMOL/L (ref 3.5–5.3)
PROT UR STRIP-MCNC: NEGATIVE MG/DL
RBC # BLD AUTO: 4.93 MILLION/UL (ref 3.81–5.12)
RBC #/AREA URNS AUTO: ABNORMAL /HPF
SODIUM SERPL-SCNC: 137 MMOL/L (ref 136–145)
SP GR UR STRIP.AUTO: 1.02 (ref 1–1.03)
UROBILINOGEN UR QL STRIP.AUTO: 0.2 E.U./DL
WBC # BLD AUTO: 9.09 THOUSAND/UL (ref 4.31–10.16)
WBC #/AREA URNS AUTO: ABNORMAL /HPF

## 2021-02-22 PROCEDURE — 99243 OFF/OP CNSLTJ NEW/EST LOW 30: CPT | Performed by: OBSTETRICS & GYNECOLOGY

## 2021-02-22 PROCEDURE — G1004 CDSM NDSC: HCPCS

## 2021-02-22 PROCEDURE — 76856 US EXAM PELVIC COMPLETE: CPT

## 2021-02-22 PROCEDURE — 76830 TRANSVAGINAL US NON-OB: CPT

## 2021-02-22 PROCEDURE — 36415 COLL VENOUS BLD VENIPUNCTURE: CPT | Performed by: PHYSICIAN ASSISTANT

## 2021-02-22 PROCEDURE — 96374 THER/PROPH/DIAG INJ IV PUSH: CPT

## 2021-02-22 PROCEDURE — 81001 URINALYSIS AUTO W/SCOPE: CPT

## 2021-02-22 PROCEDURE — 96375 TX/PRO/DX INJ NEW DRUG ADDON: CPT

## 2021-02-22 PROCEDURE — 81025 URINE PREGNANCY TEST: CPT | Performed by: PHYSICIAN ASSISTANT

## 2021-02-22 PROCEDURE — 80048 BASIC METABOLIC PNL TOTAL CA: CPT | Performed by: PHYSICIAN ASSISTANT

## 2021-02-22 PROCEDURE — 74177 CT ABD & PELVIS W/CONTRAST: CPT

## 2021-02-22 PROCEDURE — 99284 EMERGENCY DEPT VISIT MOD MDM: CPT

## 2021-02-22 PROCEDURE — 85025 COMPLETE CBC W/AUTO DIFF WBC: CPT | Performed by: PHYSICIAN ASSISTANT

## 2021-02-22 PROCEDURE — 99285 EMERGENCY DEPT VISIT HI MDM: CPT | Performed by: PHYSICIAN ASSISTANT

## 2021-02-22 RX ORDER — NAPROXEN 500 MG/1
500 TABLET ORAL 2 TIMES DAILY WITH MEALS
Qty: 30 TABLET | Refills: 0 | Status: SHIPPED | OUTPATIENT
Start: 2021-02-22 | End: 2021-02-24 | Stop reason: HOSPADM

## 2021-02-22 RX ORDER — KETOROLAC TROMETHAMINE 30 MG/ML
15 INJECTION, SOLUTION INTRAMUSCULAR; INTRAVENOUS ONCE
Status: COMPLETED | OUTPATIENT
Start: 2021-02-22 | End: 2021-02-22

## 2021-02-22 RX ORDER — HYDROMORPHONE HCL/PF 1 MG/ML
1 SYRINGE (ML) INJECTION ONCE
Status: DISCONTINUED | OUTPATIENT
Start: 2021-02-22 | End: 2021-02-22

## 2021-02-22 RX ORDER — HYDROMORPHONE HCL/PF 1 MG/ML
1 SYRINGE (ML) INJECTION ONCE
Status: COMPLETED | OUTPATIENT
Start: 2021-02-22 | End: 2021-02-22

## 2021-02-22 RX ORDER — ACETAMINOPHEN 325 MG/1
650 TABLET ORAL ONCE
Status: COMPLETED | OUTPATIENT
Start: 2021-02-22 | End: 2021-02-22

## 2021-02-22 RX ADMIN — HYDROMORPHONE HYDROCHLORIDE 1 MG: 1 INJECTION, SOLUTION INTRAMUSCULAR; INTRAVENOUS; SUBCUTANEOUS at 07:10

## 2021-02-22 RX ADMIN — KETOROLAC TROMETHAMINE 15 MG: 30 INJECTION, SOLUTION INTRAMUSCULAR; INTRAVENOUS at 11:08

## 2021-02-22 RX ADMIN — ACETAMINOPHEN 650 MG: 325 TABLET ORAL at 12:06

## 2021-02-22 RX ADMIN — IOHEXOL 100 ML: 350 INJECTION, SOLUTION INTRAVENOUS at 08:03

## 2021-02-22 NOTE — CONSULTS
Consultation - Gynecology  Ann Smith 31 y o  female MRN: [id number]  Unit/Bed#: ED 15 Encounter: [id number]      Inpatient consult to Obstetrics / Gynecology  Consult performed by: Ubaldo Frazier MD  Consult ordered by: Debi Rodriguez PA-C            Chief Complaint   Patient presents with    Abdominal Pain     pt reports lower abdominal pain that started a few days ago  History of Present Illness   Physician Requesting Consult: No att  providers found  Reason for Consult / Principal Problem: pelvic pain   Subspeciality: General GYN  HPI: Ann Smith is a 31 y o  female w/ hx endometriosis and recurrent ovarian cysts, recently s/p RSO in Dec 2020, who presents with severe pelvic pain for the last few days  She denies fever, chills, nausea, vomiting  LMP was Feb 4 2020  She denies current vaginal bleeding or irregular bleeding since her surgery on 12/7/2020  Surgery was for large ovarian cyst that was causing her pain; RSO was performed  Final pathology returned as hemorrhagic cyst but endometriosis was also appreciated at the time of her surgery  Since surgery she notes her pain improved she did not have any problems until the last few days  She is worried as she has been TTC for 6 years now with her current partner and now that she only has a left tube and ovary remaining, she is concerned about her future fertility  She does have an upcoming appt with Dr Margrett Sacks for infertility evaluation  Her partner previously had a sperm count that was low  She has had 3 prior term SVDs with a different FOB  For the last 6 years she has been TTC - efforts have been inconsistent within this time as intercourse is painful for her  Review of Systems   Constitutional: Negative for appetite change, chills and fever  Respiratory: Negative for shortness of breath  Cardiovascular: Negative for chest pain  Gastrointestinal: Positive for abdominal pain  Negative for nausea and vomiting  Genitourinary: Positive for pelvic pain (suprapubic)  Negative for difficulty urinating and vaginal bleeding (minimal)  Psychiatric/Behavioral: Negative for behavioral problems  Historical Information   Past Medical History:   Diagnosis Date    No known health problems      Past Surgical History:   Procedure Laterality Date    CYSTOSCOPY N/A 2020    Procedure: CYSTOSCOPY;  Surgeon: Ivory Devries DO;  [facility];  Service: Gynecology    NO PAST SURGERIES      OOPHORECTOMY Right 2020    Procedure: Nidhi Peels;  Surgeon: Ivory Devries DO;  [facility];  Service: Gynecology    PELVIC LAPAROSCOPY Bilateral 2020    Procedure: CYSTECTOMY OVARIAN, LAPAROSCOPIC;  Surgeon: Ivory Devries DO;  [facility];  Service: Gynecology    MI LAP,DIAGNOSTIC ABDOMEN N/A 2020    Procedure: LAPAROSCOPY DIAGNOSTIC;  Surgeon: Ivory Devries DO;  [facility];  Service: Gynecology    MI LAP,RMV  ADNEXAL STRUCTURE Right 2020    Procedure: SALPINGECTOMY, LAPAROSCOPIC;  Surgeon: Ivory Devries DO;  [facility];  Service: Gynecology     OB History    Para Term  AB Living   5 3 3   2 3   SAB TAB Ectopic Multiple Live Births   1       3      # Outcome Date GA Lbr Giles/2nd Weight Sex Delivery Anes PTL Lv   5 Term            4 Term            3 Term            2 AB            1 SAB              Family History   Problem Relation Age of Onset    Cancer Mother 48        brain cancer     Social History   Social History     Substance and Sexual Activity   Alcohol Use No     Social History     Substance and Sexual Activity   Drug Use No     Social History     Tobacco Use   Smoking Status Never Smoker   Smokeless Tobacco Never Used       Meds/Allergies   No current facility-administered medications for this encounter            No Known Allergies    Objective   Vitals: Blood pressure 128/89, pulse 90, temperature 97 9 °F (36 6 °C), temperature source Oral, resp  rate 16, SpO2 100 %  There is no height or weight on file to calculate BMI  No intake or output data in the 24 hours ending 02/22/21 1235    Invasive Devices     Peripheral Intravenous Line            Peripheral IV 02/22/21 Left Antecubital less than 1 day                Physical Exam  Vitals signs reviewed  Constitutional:       General: She is in acute distress  Appearance: She is obese  HENT:      Head: Normocephalic and atraumatic  Eyes:      Conjunctiva/sclera: Conjunctivae normal    Cardiovascular:      Rate and Rhythm: Normal rate  Pulmonary:      Effort: Pulmonary effort is normal  No respiratory distress  Abdominal:      General: There is no distension  Palpations: There is no mass  Tenderness: There is abdominal tenderness (suprapubic)  There is no guarding  Musculoskeletal: Normal range of motion  Skin:     General: Skin is warm and dry  Neurological:      General: No focal deficit present  Mental Status: She is alert and oriented to person, place, and time  Mental status is at baseline     Psychiatric:         Mood and Affect: Mood normal          Behavior: Behavior normal          Lab Results:   Recent Results (from the past 24 hour(s))   CBC and differential    Collection Time: 02/22/21  7:06 AM   Result Value Ref Range    WBC 9 09 4 31 - 10 16 Thousand/uL    RBC 4 93 3 81 - 5 12 Million/uL    Hemoglobin 9 9 (L) 11 5 - 15 4 g/dL    Hematocrit 34 6 (L) 34 8 - 46 1 %    MCV 70 (L) 82 - 98 fL    MCH 20 1 (L) 26 8 - 34 3 pg    MCHC 28 6 (L) 31 4 - 37 4 g/dL    RDW 15 9 (H) 11 6 - 15 1 %    MPV 11 4 8 9 - 12 7 fL    Platelets 440 040 - 057 Thousands/uL    nRBC 0 /100 WBCs    Neutrophils Relative 61 43 - 75 %    Immat GRANS % 0 0 - 2 %    Lymphocytes Relative 31 14 - 44 %    Monocytes Relative 7 4 - 12 %    Eosinophils Relative 1 0 - 6 %    Basophils Relative 0 0 - 1 %    Neutrophils Absolute 5 46 1 85 - 7 62 Thousands/µL Immature Grans Absolute 0 03 0 00 - 0 20 Thousand/uL    Lymphocytes Absolute 2 79 0 60 - 4 47 Thousands/µL    Monocytes Absolute 0 65 0 17 - 1 22 Thousand/µL    Eosinophils Absolute 0 12 0 00 - 0 61 Thousand/µL    Basophils Absolute 0 04 0 00 - 0 10 Thousands/µL   Basic metabolic panel    Collection Time: 02/22/21  7:06 AM   Result Value Ref Range    Sodium 137 136 - 145 mmol/L    Potassium 3 3 (L) 3 5 - 5 3 mmol/L    Chloride 104 100 - 108 mmol/L    CO2 24 21 - 32 mmol/L    ANION GAP 9 4 - 13 mmol/L    BUN 5 5 - 25 mg/dL    Creatinine 0 57 (L) 0 60 - 1 30 mg/dL    Glucose 105 65 - 140 mg/dL    Calcium 8 4 8 3 - 10 1 mg/dL    eGFR 124 ml/min/1 73sq m   POCT pregnancy, urine    Collection Time: 02/22/21  7:09 AM   Result Value Ref Range    EXT PREG TEST UR (Ref: Negative) negative     Control valid    Urine Macroscopic, POC    Collection Time: 02/22/21  7:20 AM   Result Value Ref Range    Color, UA Yellow     Clarity, UA Clear     pH, UA 6 5 4 5 - 8 0    Leukocytes, UA Small (A) Negative    Nitrite, UA Negative Negative    Protein, UA Negative Negative mg/dl    Glucose, UA Negative Negative mg/dl    Ketones, UA Negative Negative mg/dl    Urobilinogen, UA 0 2 0 2, 1 0 E U /dl E U /dl    Bilirubin, UA Negative Negative    Blood, UA Trace (A) Negative    Specific Gravity, UA 1 025 1 003 - 1 030   Urine Microscopic    Collection Time: 02/22/21  7:20 AM   Result Value Ref Range    RBC, UA None Seen None Seen, 2-4 /hpf    WBC, UA 4-10 (A) None Seen, 2-4 /hpf    Epithelial Cells Occasional None Seen, Occasional /hpf    Bacteria, UA Occasional None Seen, Occasional /hpf       Imaging:   CT abd pelvis 2/22/2021:  "FINDINGS:     ABDOMEN     LOWER CHEST:  No clinically significant abnormality identified in the visualized lower chest      LIVER/BILIARY TREE:  Unremarkable      GALLBLADDER:  No calcified gallstones   No pericholecystic inflammatory change      SPLEEN:  Unremarkable      PANCREAS:  Unremarkable      ADRENAL GLANDS: Unremarkable      KIDNEYS/URETERS:  Unremarkable  No hydronephrosis      STOMACH AND BOWEL:  Unremarkable      APPENDIX:  No findings to suggest appendicitis      ABDOMINOPELVIC CAVITY:  No ascites  No pneumoperitoneum  No lymphadenopathy      VESSELS:  Unremarkable for patient's age      PELVIS     REPRODUCTIVE ORGANS:  Multiple leiomyomata are stable demonstrating various enhancing pattern  Patient is status post right salpingectomy and oophorectomy      The left ovary has a dominant 4 7 x 2 8 x 3 9 cm cyst   There is enhancement of the surrounding ovarian tissue  Free fluid is noted within the left adnexa, between the ovary in the uterus      URINARY BLADDER:  Unremarkable      ABDOMINAL WALL/INGUINAL REGIONS:  There is a small fat-containing umbilical hernia      OSSEOUS STRUCTURES:  No acute fracture or destructive osseous lesion      IMPRESSION:     4 7 x 2 8 x 3 9 simple appearing left ovarian cyst with adjacent free fluid  While this is likely a simple appearing cyst, given the patient's symptoms, the possibility of infection or ovarian torsion should be considered "    TVUS 2/22/2021:  "FINDINGS:     UTERUS:  Position: Anteverted and anteflexed  Size: 11 8 x 6 9 x 7 7 cm  Myometrium: Normal contour and echogenicity  3 9 x 3 1 x 3 9 cm intramural leiomyoma in the anterior corpus  Cervix: Normal in appearance      ENDOMETRIUM:    Thickness: Normal in thickness, measuring 10 mm  in maximal thickness  Echotexture: Normal and homogenous in echogenicity with no evidence of endometrial mass or fluid collection      OVARIES/ADNEXA:  Right ovary:  Absent      Left ovary:  5 8 x 2 9 x 4 9 cm  No suspicious left ovarian abnormality  3 6 x 2 7 x 4 2 cm simple cyst   Doppler flow within normal limits      OTHER FINDINGS:  Two adjacent similar-appearing ovoid masses abutting the anterior surface of the lower uterine segment, on either side of the midline    The right-sided mass measures 3 4 x 3 3 x 3 9 cm   The left-sided mass measures 3 2 x 2 2 x 3 6 cm  Both masses are   avascular and each contains innumerable low level echoes, typical of endometriomas      FREE FLUID: Small amount of free fluid adjacent to the uterine fundus      IMPRESSION:  1   3 6 x 2 7 x 4 2 cm simple left ovarian cyst   2   No evidence of left adnexal torsion  3   3 1 x 3 9 cm anterior uterine leiomyoma  4   Two masses, typical of endometriomas, adjacent to the anterior wall of the lower uterine segment  These measure 3 4 x 3 3 x 3 9 cm and 3 2 x 2 2 x 3 6 cm"    Imaging Studies: I have personally reviewed pertinent reports  EKG, Pathology, and Other Studies: I have personally reviewed pertinent reports  Assessment/Plan     Assessment:  Mauricio Watts is a 31 y o  [id number] female w/ hx endoemtriosis, recurrent ovarian cysts, and s/p recent laparoscopic RSO, who presents with severe pelvic pain  Imaging suggests evidence of two new likely endometriomas anterior to the uterus, posterior to the bladder, correlating to her suprapubic pain  Other than suprapubic tenderness, abdominal exam is benign  She does not have a surgical abdomen  Ovarian torsion has been ruled out by imaging  Surgical management not indicated at this time  Given timing of pain in relation to LMP and new ultrasound findings, this is likely endometriosis pain, which should subside over the next few days  She does have an upcoming appointment with our [facility] specialist, Dr Peña Roberto  Encouraged her to keep this appt  Discussed that she may provide better guidance about removal v conservative management of endometriomas in the context of infertility  Discussed that she would be able to perform any future surgery as well to aid in increasing chance of fertility  Plan:  - D/c with oral pain meds per ED physician  - Will task Shaunaskyler MARTINEZ  15  office to have her f/u in our office in 1-2 weeks  - Encouraged her to keep appt with Dr Peña Roberto   Recommend her partner consider undergoing another sperm count in preparation for consultation with Dr Margrett Sacks   - Discussed return precautions to return to ED  Pt knows to call clinic number if symptoms persist/worsen  Code Status: Prior    Counseling / Coordination of Care  Total floor / unit time spent today20 minutes  minutes  Greater than 50% of total time was spent with the patient and / or family counseling and / or coordination of care       Ubaldo Frazeir MD  2/22/2021  12:35 PM no claudication/no palpitations/no chest pain/no peripheral edema/no dyspnea on exertion

## 2021-02-22 NOTE — DISCHARGE INSTRUCTIONS
DISCHARGE INSTRUCTIONS:    FOLLOW UP WITH YOUR PRIMARY CARE PROVIDER OR THE [address] [place] [address]  MAKE AN APPOINTMENT TO BE SEEN  TAKE MEDICATION AS PRESCRIBED  IF RASH, SHORTNESS OF BREATH OR TROUBLE SWALLOWING, STOP TAKING THE MEDICATION AND BE SEEN  REST AND DRINK PLENTY OF FLUIDS  FOLLOW UP WITH OBGYN  IF SYMPTOMS WORSEN OR NEW SYMPTOMS ARISE, RETURN TO THE ER TO BE SEEN

## 2021-02-22 NOTE — ED NOTES
Spoke with ultrasound, tech states patient will have her ultrasound in approx  15-30 more min  Patient made aware       Zan Almonte RN  02/22/21 1002

## 2021-02-22 NOTE — Clinical Note
Daniel Riggs was seen and treated in our emergency department on 2/22/2021  Diagnosis:     Luz Johnson  may return to work on return date  She may return on this date: 02/23/2021         If you have any questions or concerns, please don't hesitate to call        Kailey Smith RN    ______________________________           _______________          _______________  Hospital Representative                              Date                                Time

## 2021-02-22 NOTE — Clinical Note
Isabella Land was seen and treated in our emergency department on 2/22/2021  Diagnosis:     Rishi Marte  may return to work on return date  She may return on this date: 02/23/2021         If you have any questions or concerns, please don't hesitate to call        Ingrid Mathur RN    ______________________________           _______________          _______________  Hospital Representative                              Date                                Time

## 2021-02-22 NOTE — ED PROVIDER NOTES
History  Chief Complaint   Patient presents with    Abdominal Pain     pt reports lower abdominal pain that started a few days ago       30y  o female with PMH of ovarian cysts and endometriosis presents to the ER for lower abdominal pain for a few days  Patient normally takes Tylenol for pain but it has not been helping  She describes her pain as sharp and radiating to her low back  Pain is constant  She denies fever, chills, URI symptoms, chest pain, dyspnea, N/V/D, urinary symptoms, vaginal discharge, weakness or paresthesias  Patient states she has experienced pain like this in the past  She had her right ovary removed in December  History provided by:  Patient   used: No        Prior to Admission Medications   Prescriptions Last Dose Informant Patient Reported? Taking?    Prenatal Vit-Fe Fumarate-FA (PREPLUS) 27-1 MG TABS   No No   Sig: TAKE 1 TABLET BY MOUTH EVERY DAY   Patient not taking: Reported on 12/5/2020   ferrous sulfate 324 (65 Fe) mg   No No   Sig: Take 1 tablet (324 mg total) by mouth daily before breakfast   Patient not taking: Reported on 12/11/2020   naproxen (NAPROSYN) 500 mg tablet   No No   Sig: Take 1 tablet (500 mg total) by mouth 2 (two) times a day as needed for moderate pain   Patient not taking: Reported on 11/13/2020   norethindrone-ethinyl estradiol (JUNEL FE 1/20) 1-20 MG-MCG per tablet   No No   Sig: Take 1 tablet by mouth daily for 28 days Use as Directed   Patient not taking: Reported on 12/11/2020   oxyCODONE-acetaminophen (PERCOCET) 5-325 mg per tablet   No No   Sig: Take 1 tablet by mouth every 6 (six) hours as needed for moderate pain or severe pain for up to 10 dosesMax Daily Amount: 4 tablets   Patient not taking: Reported on 12/11/2020      Facility-Administered Medications: None       Past Medical History:   Diagnosis Date    No known health problems        Past Surgical History:   Procedure Laterality Date    CYSTOSCOPY N/A 12/7/2020    Procedure: CYSTOSCOPY;  Surgeon: Devan Hardy DO;  [facility];  Service: Gynecology    NO PAST SURGERIES      OOPHORECTOMY Right 12/7/2020    Procedure: Basil Matsu;  Surgeon: Devan Hardy DO;  [facility];  Service: Gynecology    PELVIC LAPAROSCOPY Bilateral 12/7/2020    Procedure: CYSTECTOMY OVARIAN, LAPAROSCOPIC;  Surgeon: Devan Hardy DO;  [facility];  Service: Gynecology    VT LAP,DIAGNOSTIC ABDOMEN N/A 12/7/2020    Procedure: LAPAROSCOPY DIAGNOSTIC;  Surgeon: Devan Hardy DO;  [facility];  Service: Gynecology    VT LAP,RMV  ADNEXAL STRUCTURE Right 12/7/2020    Procedure: SALPINGECTOMY, LAPAROSCOPIC;  Surgeon: Devan Hardy DO;  [facility];  Service: Gynecology       Family History   Problem Relation Age of Onset    Cancer Mother 48        brain cancer     I have reviewed and agree with the history as documented  E-Cigarette/Vaping    E-Cigarette Use Never User      E-Cigarette/Vaping Substances    Nicotine No     THC No     Flavoring No      Social History     Tobacco Use    Smoking status: Never Smoker    Smokeless tobacco: Never Used   Substance Use Topics    Alcohol use: No    Drug use: No       Review of Systems   Constitutional: Negative for activity change, appetite change, chills and fever  HENT: Negative for congestion, drooling, ear discharge, ear pain, facial swelling, rhinorrhea and sore throat  Eyes: Negative for redness  Respiratory: Negative for shortness of breath  Cardiovascular: Negative for chest pain  Gastrointestinal: Positive for abdominal pain  Negative for diarrhea, nausea and vomiting  Genitourinary: Negative for dysuria, frequency, hematuria, urgency, vaginal bleeding and vaginal discharge  Musculoskeletal: Negative for neck stiffness  Skin: Negative for rash  Allergic/Immunologic: Negative for food allergies  Neurological: Negative for weakness and numbness  Physical Exam  Physical Exam  Vitals signs and nursing note reviewed  Constitutional:       General: She is not in acute distress  Appearance: She is not toxic-appearing  HENT:      Head: Normocephalic and atraumatic  Eyes:      Conjunctiva/sclera: Conjunctivae normal    Neck:      Musculoskeletal: Normal range of motion and neck supple  Trachea: No tracheal deviation  Cardiovascular:      Rate and Rhythm: Normal rate and regular rhythm  Heart sounds: Normal heart sounds, S1 normal and S2 normal  No murmur  No friction rub  No gallop  Pulmonary:      Effort: Pulmonary effort is normal  No respiratory distress  Breath sounds: Normal breath sounds  No decreased breath sounds, wheezing, rhonchi or rales  Chest:      Chest wall: No tenderness  Abdominal:      General: Bowel sounds are normal  There is no distension  Palpations: Abdomen is soft  Tenderness: There is abdominal tenderness in the right lower quadrant, suprapubic area and left lower quadrant  There is no guarding or rebound  Skin:     General: Skin is warm and dry  Findings: No rash  Neurological:      Mental Status: She is alert  GCS: GCS eye subscore is 4  GCS verbal subscore is 5  GCS motor subscore is 6           Vital Signs  ED Triage Vitals   Temperature Pulse Respirations Blood Pressure SpO2   02/22/21 0629 02/22/21 0626 02/22/21 0626 02/22/21 0626 02/22/21 0626   97 9 °F (36 6 °C) 97 18 (!) 163/121 100 %      Temp Source Heart Rate Source Patient Position - Orthostatic VS BP Location FiO2 (%)   02/22/21 0629 02/22/21 0626 02/22/21 0626 02/22/21 0626 --   Oral Monitor Lying Right arm       Pain Score       02/22/21 0710       Worst Possible Pain           Vitals:    02/22/21 0626 02/22/21 0740 02/22/21 0959   BP: (!) 163/121 122/75 128/89   Pulse: 97 75 90   Patient Position - Orthostatic VS: Lying           Visual Acuity      ED Medications  Medications   HYDROmorphone (DILAUDID) injection 1 mg (1 mg Intravenous Given 2/22/21 0710)   iohexol (OMNIPAQUE) 350 MG/ML injection (SINGLE-DOSE) 100 mL (100 mL Intravenous Given 2/22/21 0803)   ketorolac (TORADOL) injection 15 mg (15 mg Intravenous Given 2/22/21 1108)   acetaminophen (TYLENOL) tablet 650 mg (650 mg Oral Given 2/22/21 1206)       Diagnostic Studies  Results Reviewed     Procedure Component Value Units Date/Time    Urine Microscopic [[id number]]  (Abnormal) Collected: 02/22/21 0720    Lab Status: Final result Specimen: Urine, Clean Catch Updated: 02/22/21 0749     RBC, UA None Seen /hpf      WBC, UA 4-10 /hpf      Epithelial Cells Occasional /hpf      Bacteria, UA Occasional /hpf     Basic metabolic panel [[id number]]  (Abnormal) Collected: 02/22/21 0706    Lab Status: Final result Specimen: Blood from Arm, Left Updated: 02/22/21 0727     Sodium 137 mmol/L      Potassium 3 3 mmol/L      Chloride 104 mmol/L      CO2 24 mmol/L      ANION GAP 9 mmol/L      BUN 5 mg/dL      Creatinine 0 57 mg/dL      Glucose 105 mg/dL      Calcium 8 4 mg/dL      eGFR 124 ml/min/1 73sq m     Narrative:      [place] guidelines for Chronic Kidney Disease (CKD):     Stage 1 with normal or high GFR (GFR > 90 mL/min/1 73 square meters)    Stage 2 Mild CKD (GFR = 60-89 mL/min/1 73 square meters)    Stage 3A Moderate CKD (GFR = 45-59 mL/min/1 73 square meters)    Stage 3B Moderate CKD (GFR = 30-44 mL/min/1 73 square meters)    Stage 4 Severe CKD (GFR = 15-29 mL/min/1 73 square meters)    Stage 5 End Stage CKD (GFR <15 mL/min/1 73 square meters)  Note: GFR calculation is accurate only with a steady state creatinine    Urine Macroscopic, POC [[id number]]  (Abnormal) Collected: 02/22/21 0720    Lab Status: Final result Specimen: Urine Updated: 02/22/21 0721     Color, UA Yellow     Clarity, UA Clear     pH, UA 6 5     Leukocytes, UA Small     Nitrite, UA Negative     Protein, UA Negative mg/dl      Glucose, UA Negative mg/dl      Ketones, UA Negative mg/dl      Urobilinogen, UA 0 2 E U /dl      Bilirubin, UA Negative     Blood, UA Trace     Specific [place], UA 1 025    Narrative:      CLINITEK RESULT    CBC and differential [[id number]]  (Abnormal) Collected: 02/22/21 0706    Lab Status: Final result Specimen: Blood from Arm, Left Updated: 02/22/21 0715     WBC 9 09 Thousand/uL      RBC 4 93 Million/uL      Hemoglobin 9 9 g/dL      Hematocrit 34 6 %      MCV 70 fL      MCH 20 1 pg      MCHC 28 6 g/dL      RDW 15 9 %      MPV 11 4 fL      Platelets 325 Thousands/uL      nRBC 0 /100 WBCs      Neutrophils Relative 61 %      Immat GRANS % 0 %      Lymphocytes Relative 31 %      Monocytes Relative 7 %      Eosinophils Relative 1 %      Basophils Relative 0 %      Neutrophils Absolute 5 46 Thousands/µL      Immature Grans Absolute 0 03 Thousand/uL      Lymphocytes Absolute 2 79 Thousands/µL      Monocytes Absolute 0 65 Thousand/µL      Eosinophils Absolute 0 12 Thousand/µL      Basophils Absolute 0 04 Thousands/µL     POCT pregnancy, urine [[id number]]  (Normal) Resulted: 02/22/21 0709    Lab Status: Final result Updated: 02/22/21 0712     EXT PREG TEST UR (Ref: Negative) negative     Control valid                 US pelvis complete w transvaginal   Final Result by Whitney Jackson MD (02/22 2958)       1   3 6 x 2 7 x 4 2 cm simple left ovarian cyst    2   No evidence of left adnexal torsion  3   3 1 x 3 9 cm anterior uterine leiomyoma  4   Two masses, typical of endometriomas, adjacent to the anterior wall of the lower uterine segment  These measure 3 4 x 3 3 x 3 9 cm and 3 2 x 2 2 x 3 6 cm                      Workstation performed: [id number]         CT abdomen pelvis with contrast   Final Result by Jen Trujillo MD (02/22 8348)      4 7 x 2 8 x 3 9 simple appearing left ovarian cyst with adjacent free fluid    While this is likely a simple appearing cyst, given the patient's symptoms, the possibility of infection or ovarian torsion should be considered  The study was marked in [facility] for immediate notification  Workstation performed: [id number]                    Procedures  Procedures         ED Course  ED Course as of Feb 22 1259   Mon Feb 22, 2021   5517 Consistent with previous labs  Hemoglobin(!): 9 9   0842 Will check US   CT abdomen pelvis with contrast   6922 Informed patient of lab and imaging findings this far  Patient is very concerned since this did happen to her previously and it resulted in an oophorectomy of her right side  1016 Patient reports pain returning  Will give Toradol  216 [facility] text sent to OB  1154 OB here to see patient  MDM  Number of Diagnoses or Management Options  Anemia: new and requires workup  Endometrioma: new and requires workup  Hypokalemia: new and requires workup  Left ovarian cyst: new and requires workup  Leiomyoma: new and requires workup  Umbilical hernia: new and requires workup  Diagnosis management comments: DDX consists of but not limited to: ovarian cysts, abdominal pathology    Will check labs and imaging  Will give Dilaudid for pain  9988 Consistent with previous labs  - Hemoglobin(!): 9 9 - Patient has a history of anemia  9811 Will check US - CT abdomen pelvis with contrast    3404 Informed patient of lab and imaging findings this far  Patient is very concerned since this did happen to her previously and it resulted in an oophorectomy of her right side  1016 Patient reports pain returning  Will give Toradol  216 [facility] text sent to OB  1154 OB here to see patient  Patient seen by OB  Plan is to discharge home with pain medication and patient can follow up as an outpatient  Informed patient of lab and imaging findings and plan  Patient agreeable  Will discharge  The management plan was discussed in detail with the patient at bedside and all questions were answered  Prior to discharge, we provided both verbal and written instructions  We discussed with the patient the signs and symptoms for which to return to the emergency department  All questions were answered and patient was comfortable with the plan of care and discharged to home  Instructed the patient to follow up with the primary care provider and/or specialist provided and their written instructions  The patient verbalized understanding of our discussion and plan of care, and agrees to return to the Emergency Department for concerns and progression of illness  At discharge, I instructed the patient to:  -follow up with pcp  -take Naproxen as prescribed for pain  -rest and drink plenty of fluids  -follow up with obgyn  -return to the ER if symptoms worsened or new symptoms arose  Patient agreed to this plan and was stable at time of discharge             Amount and/or Complexity of Data Reviewed  Clinical lab tests: ordered and reviewed  Tests in the radiology section of CPT®: ordered and reviewed  Review and summarize past medical records: yes  Discuss the patient with other providers: yes    Patient Progress  Patient progress: stable      Disposition  Final diagnoses:   Left ovarian cyst   Leiomyoma   Endometrioma   Umbilical hernia   Hypokalemia   Anemia     Time reflects when diagnosis was documented in both MDM as applicable and the Disposition within this note     Time User Action Codes Description Comment    2/22/2021 12:29 PM Blair Dc A Add [Y99 235] Left ovarian cyst     2/22/2021 12:29 PM Blair Dc A Add [D21 9] Leiomyoma     2/22/2021 12:29 PM Blair Dc A Add [N80 9] Endometrioma     2/22/2021 12:29 PM Blair Dc A Add [Z28 6] Umbilical hernia     5/97/1091 12:29 PM Blair Dc A Add [E87 6] Hypokalemia     2/22/2021 12:29 PM Blair Dc A Add [D64 9] Anemia       ED Disposition     ED Disposition Condition Date/Time Comment    Discharge Stable Mon Feb 22, 2021 12:29 PM Marian Stacy discharge to home/self care  Follow-up Information     Follow up With Specialties Details Why Contact Info Additional Information    Naresh Oviedo MD Family Medicine Schedule an appointment as soon as possible for a visit   [address] [facility] [id number] Esparza Schedule an appointment as soon as possible for a visit  with obyn [address], [address] [facility] [address]  [address], [address], [address] [place], [place], [address]          Discharge Medication List as of 2/22/2021 12:30 PM      START taking these medications    Details   !! naproxen (NAPROSYN) 500 mg tablet Take 1 tablet (500 mg total) by mouth 2 (two) times a day with meals, Starting Mon 2/22/2021, Normal       !! - Potential duplicate medications found  Please discuss with provider  CONTINUE these medications which have NOT CHANGED    Details   ferrous sulfate 324 (65 Fe) mg Take 1 tablet (324 mg total) by mouth daily before breakfast, Starting Wed 4/29/2020, Normal      !! naproxen (NAPROSYN) 500 mg tablet Take 1 tablet (500 mg total) by mouth 2 (two) times a day as needed for moderate pain, Starting Wed 4/29/2020, Normal      norethindrone-ethinyl estradiol (JUNEL FE 1/20) 1-20 MG-MCG per tablet Take 1 tablet by mouth daily for 28 days Use as Directed, Starting Fri 11/13/2020, Until Fri 12/11/2020, Normal      oxyCODONE-acetaminophen (PERCOCET) 5-325 mg per tablet Take 1 tablet by mouth every 6 (six) hours as needed for moderate pain or severe pain for up to 10 dosesMax Daily Amount: 4 tablets, Starting Mon 12/7/2020, Normal      Prenatal Vit-Fe Fumarate-FA (PREPLUS) 27-1 MG TABS TAKE 1 TABLET BY MOUTH EVERY DAY, Normal       !! - Potential duplicate medications found  Please discuss with provider          No discharge procedures on file      PDMP Review       Value Time User    PDMP Reviewed  Yes 12/5/2020 12:26 PM Chanell Dennis MD          ED Provider  Electronically Signed by           Khanh Mcintyre PA-C  02/22/21 2310

## 2021-02-23 ENCOUNTER — HOSPITAL ENCOUNTER (INPATIENT)
Facility: HOSPITAL | Age: 31
LOS: 1 days | Discharge: HOME | DRG: 532 | End: 2021-02-24
Attending: EMERGENCY MEDICINE | Admitting: OBSTETRICS & GYNECOLOGY
Payer: COMMERCIAL

## 2021-02-23 ENCOUNTER — APPOINTMENT (INPATIENT)
Dept: ULTRASOUND IMAGING | Facility: HOSPITAL | Age: 31
DRG: 532 | End: 2021-02-23
Payer: COMMERCIAL

## 2021-02-23 DIAGNOSIS — N80.9 ENDOMETRIOSIS: ICD-10-CM

## 2021-02-23 DIAGNOSIS — Z31.9 PATIENT DESIRES PREGNANCY: ICD-10-CM

## 2021-02-23 DIAGNOSIS — R52 INTRACTABLE PAIN: ICD-10-CM

## 2021-02-23 DIAGNOSIS — R10.2 PELVIC PAIN: Primary | ICD-10-CM

## 2021-02-23 LAB
ABO GROUP BLD: NORMAL
ABO GROUP BLD: NORMAL
BLD GP AB SCN SERPL QL: NEGATIVE
ERYTHROCYTE [DISTWIDTH] IN BLOOD BY AUTOMATED COUNT: 15.5 % (ref 11.6–15.1)
HCT VFR BLD AUTO: 32.6 % (ref 34.8–46.1)
HGB BLD-MCNC: 9.5 G/DL (ref 11.5–15.4)
MCH RBC QN AUTO: 20.1 PG (ref 26.8–34.3)
MCHC RBC AUTO-ENTMCNC: 29.1 G/DL (ref 31.4–37.4)
MCV RBC AUTO: 69 FL (ref 82–98)
PLATELET # BLD AUTO: 316 THOUSANDS/UL (ref 149–390)
PMV BLD AUTO: 11 FL (ref 8.9–12.7)
RBC # BLD AUTO: 4.72 MILLION/UL (ref 3.81–5.12)
RH BLD: NEGATIVE
RH BLD: NEGATIVE
SPECIMEN EXPIRATION DATE: NORMAL
WBC # BLD AUTO: 10.02 THOUSAND/UL (ref 4.31–10.16)

## 2021-02-23 PROCEDURE — 76856 US EXAM PELVIC COMPLETE: CPT

## 2021-02-23 PROCEDURE — 86850 RBC ANTIBODY SCREEN: CPT | Performed by: OBSTETRICS & GYNECOLOGY

## 2021-02-23 PROCEDURE — 86901 BLOOD TYPING SEROLOGIC RH(D): CPT | Performed by: OBSTETRICS & GYNECOLOGY

## 2021-02-23 PROCEDURE — 36415 COLL VENOUS BLD VENIPUNCTURE: CPT | Performed by: OBSTETRICS & GYNECOLOGY

## 2021-02-23 PROCEDURE — 99224 PR SBSQ OBSERVATION CARE/DAY 15 MINUTES: CPT | Performed by: OBSTETRICS & GYNECOLOGY

## 2021-02-23 PROCEDURE — 99285 EMERGENCY DEPT VISIT HI MDM: CPT

## 2021-02-23 PROCEDURE — 99284 EMERGENCY DEPT VISIT MOD MDM: CPT | Performed by: EMERGENCY MEDICINE

## 2021-02-23 PROCEDURE — 85027 COMPLETE CBC AUTOMATED: CPT | Performed by: OBSTETRICS & GYNECOLOGY

## 2021-02-23 PROCEDURE — 86900 BLOOD TYPING SEROLOGIC ABO: CPT | Performed by: OBSTETRICS & GYNECOLOGY

## 2021-02-23 RX ORDER — OXYCODONE HCL 10 MG/1
10 TABLET, FILM COATED, EXTENDED RELEASE ORAL EVERY 12 HOURS SCHEDULED
Status: DISCONTINUED | OUTPATIENT
Start: 2021-02-23 | End: 2021-02-24 | Stop reason: HOSPADM

## 2021-02-23 RX ORDER — SODIUM CHLORIDE, SODIUM LACTATE, POTASSIUM CHLORIDE, CALCIUM CHLORIDE 600; 310; 30; 20 MG/100ML; MG/100ML; MG/100ML; MG/100ML
125 INJECTION, SOLUTION INTRAVENOUS CONTINUOUS
Status: DISCONTINUED | OUTPATIENT
Start: 2021-02-23 | End: 2021-02-24 | Stop reason: HOSPADM

## 2021-02-23 RX ORDER — OXYCODONE HCL 10 MG/1
10 TABLET, FILM COATED, EXTENDED RELEASE ORAL EVERY 12 HOURS SCHEDULED
Status: DISCONTINUED | OUTPATIENT
Start: 2021-02-23 | End: 2021-02-23

## 2021-02-23 RX ORDER — ACETAMINOPHEN 325 MG/1
650 TABLET ORAL EVERY 6 HOURS SCHEDULED
Status: DISCONTINUED | OUTPATIENT
Start: 2021-02-24 | End: 2021-02-23

## 2021-02-23 RX ORDER — IBUPROFEN 600 MG/1
600 TABLET ORAL EVERY 6 HOURS SCHEDULED
Status: DISCONTINUED | OUTPATIENT
Start: 2021-02-23 | End: 2021-02-24 | Stop reason: HOSPADM

## 2021-02-23 RX ORDER — ACETAMINOPHEN 325 MG/1
650 TABLET ORAL EVERY 6 HOURS SCHEDULED
Status: DISCONTINUED | OUTPATIENT
Start: 2021-02-23 | End: 2021-02-23

## 2021-02-23 RX ORDER — HYDROMORPHONE HCL/PF 1 MG/ML
1 SYRINGE (ML) INJECTION ONCE
Status: DISCONTINUED | OUTPATIENT
Start: 2021-02-23 | End: 2021-02-23

## 2021-02-23 RX ORDER — ACETAMINOPHEN 325 MG/1
650 TABLET ORAL EVERY 6 HOURS SCHEDULED
Status: DISCONTINUED | OUTPATIENT
Start: 2021-02-23 | End: 2021-02-24 | Stop reason: HOSPADM

## 2021-02-23 RX ORDER — KETOROLAC TROMETHAMINE 30 MG/ML
30 INJECTION, SOLUTION INTRAMUSCULAR; INTRAVENOUS EVERY 6 HOURS SCHEDULED
Status: DISCONTINUED | OUTPATIENT
Start: 2021-02-23 | End: 2021-02-23

## 2021-02-23 RX ADMIN — KETOROLAC TROMETHAMINE 30 MG: 30 INJECTION, SOLUTION INTRAMUSCULAR; INTRAVENOUS at 14:02

## 2021-02-23 RX ADMIN — ACETAMINOPHEN 650 MG: 325 TABLET ORAL at 20:03

## 2021-02-23 RX ADMIN — IBUPROFEN 600 MG: 600 TABLET ORAL at 18:28

## 2021-02-23 RX ADMIN — KETOROLAC TROMETHAMINE 30 MG: 30 INJECTION, SOLUTION INTRAMUSCULAR; INTRAVENOUS at 08:35

## 2021-02-23 RX ADMIN — OXYCODONE HYDROCHLORIDE 10 MG: 10 TABLET, FILM COATED, EXTENDED RELEASE ORAL at 05:33

## 2021-02-23 RX ADMIN — SODIUM CHLORIDE, SODIUM LACTATE, POTASSIUM CHLORIDE, AND CALCIUM CHLORIDE 125 ML/HR: .6; .31; .03; .02 INJECTION, SOLUTION INTRAVENOUS at 07:33

## 2021-02-23 RX ADMIN — OXYCODONE HYDROCHLORIDE 10 MG: 10 TABLET, FILM COATED, EXTENDED RELEASE ORAL at 20:03

## 2021-02-23 RX ADMIN — SODIUM CHLORIDE, SODIUM LACTATE, POTASSIUM CHLORIDE, AND CALCIUM CHLORIDE 125 ML/HR: .6; .31; .03; .02 INJECTION, SOLUTION INTRAVENOUS at 20:05

## 2021-02-23 NOTE — PLAN OF CARE
Problem: Potential for Falls  Goal: Patient will remain free of falls  Description: INTERVENTIONS:  - Assess patient frequently for physical needs  -  Identify cognitive and physical deficits and behaviors that affect risk of falls    -  [place] fall precautions as indicated by assessment   - Educate patient/family on patient safety including physical limitations  - Instruct patient to call for assistance with activity based on assessment  - Modify environment to reduce risk of injury  - Consider OT/PT consult to assist with strengthening/mobility  Outcome: Progressing

## 2021-02-23 NOTE — ED PROVIDER NOTES
History  Chief Complaint   Patient presents with    Pelvic Pain     Pt reports pelvic pain for several days  Pt states she was seen in ED yesterday for same s/s but pain is worsening  Pt is a 31year old female with a PMH of recurrent ovarian cysts and right salpingo-oophorectomy presenting with intractable abdominal pain  Pt was seen yesterday for the same and diagnosed with left ovarian cyst and endometriomas  She was sent home with naproxen, which is not helping her pain  States "this is even worse than when I had to get my tube and ovary removed"  Denies fevers, chills, sweats, n/v/d/c, urinary symptoms, back pain  History provided by:  Patient   used: No    Pelvic Pain  Location:  B/l L>R  Quality:  Sharp  Severity:  Severe  Onset quality:  Gradual  Timing:  Constant  Progression:  Worsening  Chronicity:  Recurrent  Context:  Left ovarian cyst   Relieved by:  Nothing   Worsened by:  Nothing   Ineffective treatments:  Naproxen, Tylenol   Associated symptoms: abdominal pain    Associated symptoms: no diarrhea, no nausea and no vomiting        Prior to Admission Medications   Prescriptions Last Dose Informant Patient Reported? Taking?    Prenatal Vit-Fe Fumarate-FA (PREPLUS) 27-1 MG TABS   No No   Sig: TAKE 1 TABLET BY MOUTH EVERY DAY   Patient not taking: Reported on 12/5/2020   ferrous sulfate 324 (65 Fe) mg   No No   Sig: Take 1 tablet (324 mg total) by mouth daily before breakfast   Patient not taking: Reported on 12/11/2020   naproxen (NAPROSYN) 500 mg tablet   No No   Sig: Take 1 tablet (500 mg total) by mouth 2 (two) times a day as needed for moderate pain   Patient not taking: Reported on 11/13/2020   naproxen (NAPROSYN) 500 mg tablet   No No   Sig: Take 1 tablet (500 mg total) by mouth 2 (two) times a day with meals   norethindrone-ethinyl estradiol (JUNEL FE 1/20) 1-20 MG-MCG per tablet   No No   Sig: Take 1 tablet by mouth daily for 28 days Use as Directed   Patient not taking: Reported on 12/11/2020   oxyCODONE-acetaminophen (PERCOCET) 5-325 mg per tablet   No No   Sig: Take 1 tablet by mouth every 6 (six) hours as needed for moderate pain or severe pain for up to 10 dosesMax Daily Amount: 4 tablets   Patient not taking: Reported on 12/11/2020      Facility-Administered Medications: None       Past Medical History:   Diagnosis Date    No known health problems        Past Surgical History:   Procedure Laterality Date    CYSTOSCOPY N/A 12/7/2020    Procedure: Nhan Silversmith;  Surgeon: Nata Noriega DO;  [facility];  Service: Gynecology    NO PAST SURGERIES      OOPHORECTOMY Right 12/7/2020    Procedure: Zada Rosetta;  Surgeon: Nata Noriega DO;  [facility];  Service: Gynecology    PELVIC LAPAROSCOPY Bilateral 12/7/2020    Procedure: CYSTECTOMY OVARIAN, LAPAROSCOPIC;  Surgeon: Nata Noriega DO;  [facility];  Service: Gynecology    NV LAP,DIAGNOSTIC ABDOMEN N/A 12/7/2020    Procedure: LAPAROSCOPY DIAGNOSTIC;  Surgeon: Nata Noriega DO;  [facility];  Service: Gynecology    NV LAP,RMV  ADNEXAL STRUCTURE Right 12/7/2020    Procedure: SALPINGECTOMY, LAPAROSCOPIC;  Surgeon: Nata Noriega DO;  [facility];  Service: Gynecology       Family History   Problem Relation Age of Onset    Cancer Mother 48        brain cancer     I have reviewed and agree with the history as documented  E-Cigarette/Vaping    E-Cigarette Use Never User      E-Cigarette/Vaping Substances    Nicotine No     THC No     Flavoring No      Social History     Tobacco Use    Smoking status: Never Smoker    Smokeless tobacco: Never Used   Substance Use Topics    Alcohol use: No    Drug use: No       Review of Systems   Constitutional: Negative  HENT: Negative  Respiratory: Negative  Cardiovascular: Negative  Gastrointestinal: Positive for abdominal pain  Negative for constipation, diarrhea, nausea and vomiting  Genitourinary: Positive for pelvic pain  Negative for decreased urine volume, difficulty urinating, dysuria, flank pain, frequency, hematuria, urgency, vaginal discharge and vaginal pain  Musculoskeletal: Negative  Neurological: Negative  All other systems reviewed and are negative  Physical Exam  Physical Exam  Constitutional:       General: She is in acute distress  Appearance: She is well-developed  She is not ill-appearing or diaphoretic  HENT:      Head: Normocephalic and atraumatic  Right Ear: External ear normal       Left Ear: External ear normal       Nose: Nose normal    Eyes:      General: No scleral icterus  Right eye: No discharge  Left eye: No discharge  Extraocular Movements: Extraocular movements intact  Conjunctiva/sclera: Conjunctivae normal    Neck:      Musculoskeletal: Normal range of motion and neck supple  Cardiovascular:      Rate and Rhythm: Normal rate and regular rhythm  Heart sounds: Normal heart sounds  Pulmonary:      Effort: Pulmonary effort is normal       Breath sounds: Normal breath sounds  Abdominal:      General: Abdomen is flat  Bowel sounds are normal       Tenderness: There is abdominal tenderness in the right lower quadrant and left lower quadrant  There is no guarding or rebound  Musculoskeletal: Normal range of motion  Skin:     General: Skin is warm and dry  Neurological:      Mental Status: She is alert and oriented to person, place, and time     Psychiatric:         Mood and Affect: Mood normal          Behavior: Behavior normal          Vital Signs  ED Triage Vitals [02/23/21 0505]   Temperature Pulse Respirations Blood Pressure SpO2   97 6 °F (36 4 °C) 101 (!) 24 (!) 155/101 97 %      Temp Source Heart Rate Source Patient Position - Orthostatic VS BP Location FiO2 (%)   Oral Monitor -- Right arm --      Pain Score       --           Vitals:    02/23/21 0505   BP: (!) 155/101   Pulse: 101         Visual Acuity      ED Medications  Medications   oxyCODONE (OxyCONTIN) 12 hr tablet 10 mg (10 mg Oral Given 2/23/21 0533)       Diagnostic Studies  Results Reviewed     None                 US pelvis complete w transvaginal    (Results Pending)              Procedures  Procedures         ED Course  ED Course as of Feb 23 0609   Tue Feb 23, 2021   0527 Spoke to Dr Jeronimo Greenfield with OB about the pt  She will come to evaluate the pt  She would like a repeat US to be done  Pt is refusing blood work and IV medications at this time  She states "you already messed up my arm" and wants PO medications  Will give Oxycontin        0606 OB will reevaluate after US results come back  Will sign out [employer], [place]                                 SBIRT 22yo+      Most Recent Value   SBIRT (25 yo +)   In order to provide better care to our patients, we are screening all of our patients for alcohol and drug use  Would it be okay to ask you these screening questions? Yes Filed at: 02/23/2021 0507   Initial Alcohol Screen: US AUDIT-C    1  How often do you have a drink containing alcohol?  0 Filed at: 02/23/2021 0507   2  How many drinks containing alcohol do you have on a typical day you are drinking? 0 Filed at: 02/23/2021 0507   3a  Male UNDER 65: How often do you have five or more drinks on one occasion? 0 Filed at: 02/23/2021 0507   3b  FEMALE Any Age, or MALE 65+: How often do you have 4 or more drinks on one occassion? 0 Filed at: 02/23/2021 0507   Audit-C Score  0 Filed at: 02/23/2021 6775   VILMA: How many times in the past year have you    Used an illegal drug or used a prescription medication for non-medical reasons?   Never Filed at: 02/23/2021 0507                    MDM  Number of Diagnoses or Management Options      Disposition  Final diagnoses:   None     ED Disposition     None      Follow-up Information    None         Patient's Medications   Discharge Prescriptions    No medications on file     No discharge procedures on file      PDMP Review       Value Time User    PDMP Reviewed  Yes 12/5/2020 12:26 PM Riccardo Marquez MD          ED Provider  Electronically Signed by           Paulina Mcdonald PA-C  02/23/21 9666

## 2021-02-23 NOTE — QUICK NOTE
Patient evaluated throughout the morning and early afternoon  Has had 2 doses of scheduled toradol since this AM (8:30am, 2pm)  Had telemedicine appointment w/ Dr Andreas Carreno today  Plan to start OCPs continuously upon discharge  Once patient changes to [employer], will schedule US and HSG w/ Dr Andreas Carreno  No surgical intervention at this time  Advance to regular diet  Continue inpatient observation for pain control        Kenny Cabrera MD  OB/GYN PGY-3  2/23/2021  2:39 PM

## 2021-02-23 NOTE — H&P
H&P Exam - Gynecology   Abilio Shields 31 y o  female MRN: [id number]  Unit/Bed#: ED 29 Encounter: [id number]      Assessment/Plan     A/P: This is a 30 yo patient with a history of endometriosis and an RSO in 12/2020 with TVUS yesterday notable for two endometriomas in the lower uterine segment and left ovarian cyst, now with worsening lower abdominal pain  Pregnancy test and UA in the ED yesterday were negative  Patient currently uncomfortable but does not have a surgical abdomen  1  Lower pelvic pain  - Repeat TVUS pending to rule out torsion and ruptured cyst  - Will admit for observation and pain control with Toradol   - Repeat CBC and type and screen pending     2  FEN  - Keep NPO for now in case of diagnostic laparoscopy today  - LR at 125 cc/hr    3  DVT ppx  - SCDs      History of Present Illness     HPI:  Abilio Shields is a 31 y o  female who presents with worsening lower abdominal pain with known endometriomas and ovarian cysts  Her history is significant for an RSO in December of 2020 for a large ovarian cyst with pathology reported as an hemorrhagic cyst but endometriosis noted during surgery  She was seen in the ED yesterday for increasing pain and a TVUS at that time showed a 3 6x2  7x4 2 simple ovarian cysts and then two masses typical for endometriomas measuring 3 4 x 3 3 x 3 9 cm and 3 2 x 2 2 x 3 6 cm  After her TVUS yesterday, she started her period  She notes that since her RSO in December, her periods have been heavier with passage of clots  She denies fevers, chills, nausea, vomiting, back pain, or dysuria  Of note, patient has had 3 prior term SVDs, but has been TTC for 6 years now with her current partner and is also worried about future infertility  She has an appt scheduled with Dr Kalin Guidry for an infertility evaluation  Review of Systems   Constitutional: Negative for chills and fever  Gastrointestinal: Positive for abdominal pain  Negative for nausea and vomiting  Genitourinary: Positive for pelvic pain and vaginal bleeding  Negative for dysuria and flank pain  Musculoskeletal: Negative for back pain  Neurological: Negative for dizziness, light-headedness and headaches  Historical Information   Past Medical History:   Diagnosis Date    No known health problems      Past Surgical History:   Procedure Laterality Date    CYSTOSCOPY N/A 12/7/2020    Procedure: CYSTOSCOPY;  Surgeon: Mauro Zuniga DO;  [facility];  Service: Gynecology    NO PAST SURGERIES      OOPHORECTOMY Right 12/7/2020    Procedure: Delphina ;  Surgeon: Mauro Zuniga DO;  [facility];  Service: Gynecology    PELVIC LAPAROSCOPY Bilateral 12/7/2020    Procedure: CYSTECTOMY OVARIAN, LAPAROSCOPIC;  Surgeon: Mauro Zuniga DO;  [facility];  Service: Gynecology    UT LAP,DIAGNOSTIC ABDOMEN N/A 12/7/2020    Procedure: LAPAROSCOPY DIAGNOSTIC;  Surgeon: Mauro Zuniga DO;  [facility];  Service: Gynecology    UT LAP,RMV  ADNEXAL STRUCTURE Right 12/7/2020    Procedure: SALPINGECTOMY, LAPAROSCOPIC;  Surgeon: Mauro Zuniga DO;  [facility];  Service: Gynecology     OB/GYN History:   Family History   Problem Relation Age of Onset    Cancer Mother 48        brain cancer     Social History   Social History     Substance and Sexual Activity   Alcohol Use No     Social History     Substance and Sexual Activity   Drug Use No     Social History     Tobacco Use   Smoking Status Never Smoker   Smokeless Tobacco Never Used       Meds/Allergies   (Not in a hospital admission)    No Known Allergies    Objective   /67 (BP Location: Right arm)   Pulse 91   Temp 97 6 °F (36 4 °C) (Oral)   Resp 18   Wt 89 5 kg (197 lb 5 oz)   LMP  (LMP Unknown)   SpO2 98%   BMI 34 95 kg/m²     No intake or output data in the 24 hours ending 02/23/21 0740      Physical Exam  Constitutional:       General: She is not in acute distress  Appearance: She is not toxic-appearing  Comments: Patient laying in bed, not in acute pain but had just received pain meds   Pulmonary:      Effort: Pulmonary effort is normal  No respiratory distress  Abdominal:      General: There is no distension  Palpations: Abdomen is soft  Tenderness: There is abdominal tenderness  There is no guarding or rebound  Comments: Tenderness on bilateral abdomen, worse on palpation of the suprapubic area   Neurological:      Mental Status: She is alert           Lab Results:   Admission on 02/23/2021   Component Date Value    WBC 02/23/2021 10 02     RBC 02/23/2021 4 72     Hemoglobin 02/23/2021 9 5*    Hematocrit 02/23/2021 32 6*    MCV 02/23/2021 69*    MCH 02/23/2021 20 1*    MCHC 02/23/2021 29 1*    RDW 02/23/2021 15 5*    Platelets 69/57/8256 316     MPV 02/23/2021 11 0           Luis Keen MD  2/23/2021  7:40 AM

## 2021-02-23 NOTE — PLAN OF CARE
Problem: Potential for Falls  Goal: Patient will remain free of falls  Description: INTERVENTIONS:  - Assess patient frequently for physical needs  -  Identify cognitive and physical deficits and behaviors that affect risk of falls    -  [place] fall precautions as indicated by assessment   - Educate patient/family on patient safety including physical limitations  - Instruct patient to call for assistance with activity based on assessment  - Modify environment to reduce risk of injury  - Consider OT/PT consult to assist with strengthening/mobility  Outcome: Progressing     Problem: PAIN - ADULT  Goal: Verbalizes/displays adequate comfort level or baseline comfort level  Description: Interventions:  - Encourage patient to monitor pain and request assistance  - Assess pain using appropriate pain scale  - Administer analgesics based on type and severity of pain and evaluate response  - Implement non-pharmacological measures as appropriate and evaluate response  - Consider cultural and social influences on pain and pain management  - Notify physician/advanced practitioner if interventions unsuccessful or patient reports new pain  Outcome: Progressing     Problem: INFECTION - ADULT  Goal: Absence or prevention of progression during hospitalization  Description: INTERVENTIONS:  - Assess and monitor for signs and symptoms of infection  - Monitor lab/diagnostic results  - Monitor all insertion sites, i e  indwelling lines, tubes, and drains  - Monitor endotracheal if appropriate and nasal secretions for changes in amount and color  - [place] appropriate cooling/warming therapies per order  - Administer medications as ordered  - Instruct and encourage patient and family to use good hand hygiene technique  - Identify and instruct in appropriate isolation precautions for identified infection/condition  Outcome: Progressing  Goal: Absence of fever/infection during neutropenic period  Description: INTERVENTIONS:  - Monitor [facility]    Progress Note      Assessment and Plan:   1. Acute respiratory failure–CHF superimposed on COPD with stress of sepsis. Was smoking right up until admission.     Recommendations: Full ventilator support, ongoing volume reducti 08/08/2019    ALB 2.8 08/08/2019    ALKPHO 49 08/08/2019    BILT 0.4 08/08/2019    TP 5.0 08/08/2019    AST 13 08/08/2019    ALT 12 08/08/2019    MG 2.1 08/08/2019    PHOS 2.4 08/08/2019     Chest x-ray–modest bilateral left greater than right pleural effu WBC    Outcome: Progressing     Problem: SAFETY ADULT  Goal: Patient will remain free of falls  Description: INTERVENTIONS:  - Assess patient frequently for physical needs  -  Identify cognitive and physical deficits and behaviors that affect risk of falls    -  [place] fall precautions as indicated by assessment   - Educate patient/family on patient safety including physical limitations  - Instruct patient to call for assistance with activity based on assessment  - Modify environment to reduce risk of injury  - Consider OT/PT consult to assist with strengthening/mobility  Outcome: Progressing  Goal: Maintain or return to baseline ADL function  Description: INTERVENTIONS:  -  Assess patient's ability to carry out ADLs; assess patient's baseline for ADL function and identify physical deficits which impact ability to perform ADLs (bathing, care of mouth/teeth, toileting, grooming, dressing, etc )  - Assess/evaluate cause of self-care deficits   - Assess range of motion  - Assess patient's mobility; develop plan if impaired  - Assess patient's need for assistive devices and provide as appropriate  - Encourage maximum independence but intervene and supervise when necessary  - Involve family in performance of ADLs  - Assess for home care needs following discharge   - Consider OT consult to assist with ADL evaluation and planning for discharge  - Provide patient education as appropriate  Outcome: Progressing  Goal: Maintain or return mobility status to optimal level  Description: INTERVENTIONS:  - Assess patient's baseline mobility status (ambulation, transfers, stairs, etc )    - Identify cognitive and physical deficits and behaviors that affect mobility  - Identify mobility aids required to assist with transfers and/or ambulation (gait belt, sit-to-stand, lift, walker, cane, etc )  - [place] fall precautions as indicated by assessment  - Record patient progress and toleration of activity level on Mobility SBAR; progress patient to next Phase/Stage  - Instruct patient to call for assistance with activity based on assessment  - Consider rehabilitation consult to assist with strengthening/weightbearing, etc   Outcome: Progressing     Problem: DISCHARGE PLANNING  Goal: Discharge to home or other facility with appropriate resources  Description: INTERVENTIONS:  - Identify barriers to discharge w/patient and caregiver  - Arrange for needed discharge resources and transportation as appropriate  - Identify discharge learning needs (meds, wound care, etc )  - Arrange for interpretive services to assist at discharge as needed  - Refer to Case Management Department for coordinating discharge planning if the patient needs post-hospital services based on physician/advanced practitioner order or complex needs related to functional status, cognitive ability, or social support system  Outcome: Progressing     Problem: Knowledge Deficit  Goal: Patient/family/caregiver demonstrates understanding of disease process, treatment plan, medications, and discharge instructions  Description: Complete learning assessment and assess knowledge base    Interventions:  - Provide teaching at level of understanding  - Provide teaching via preferred learning methods  Outcome: Progressing

## 2021-02-23 NOTE — ED NOTES
Charge RN to bedside for PIV attempt  Upon entry to room patient adamantly refusing any venipuncture at this time stating, "No way! You all messed up my veins! No needles  You are not sticking me with any needles!" Patient reassured that she had the right to refuse  Provider made aware  Orders modified        [address] [facility] [address], RN  02/23/21 9669

## 2021-02-24 VITALS
WEIGHT: 197.31 LBS | SYSTOLIC BLOOD PRESSURE: 119 MMHG | OXYGEN SATURATION: 99 % | TEMPERATURE: 97.5 F | DIASTOLIC BLOOD PRESSURE: 76 MMHG | RESPIRATION RATE: 18 BRPM | BODY MASS INDEX: 34.95 KG/M2 | HEART RATE: 84 BPM

## 2021-02-24 LAB
ERYTHROCYTE [DISTWIDTH] IN BLOOD BY AUTOMATED COUNT: 15.4 % (ref 11.6–15.1)
HCT VFR BLD AUTO: 28.8 % (ref 34.8–46.1)
HGB BLD-MCNC: 8.3 G/DL (ref 11.5–15.4)
MCH RBC QN AUTO: 20.1 PG (ref 26.8–34.3)
MCHC RBC AUTO-ENTMCNC: 28.8 G/DL (ref 31.4–37.4)
MCV RBC AUTO: 70 FL (ref 82–98)
PLATELET # BLD AUTO: 284 THOUSANDS/UL (ref 149–390)
PMV BLD AUTO: 11.3 FL (ref 8.9–12.7)
RBC # BLD AUTO: 4.13 MILLION/UL (ref 3.81–5.12)
WBC # BLD AUTO: 8.5 THOUSAND/UL (ref 4.31–10.16)

## 2021-02-24 PROCEDURE — 99232 SBSQ HOSP IP/OBS MODERATE 35: CPT | Performed by: OBSTETRICS & GYNECOLOGY

## 2021-02-24 PROCEDURE — 85027 COMPLETE CBC AUTOMATED: CPT | Performed by: OBSTETRICS & GYNECOLOGY

## 2021-02-24 RX ORDER — DESOGESTREL AND ETHINYL ESTRADIOL 0.15-0.03
1 KIT ORAL DAILY
Qty: 28 TABLET | Refills: 2 | Status: SHIPPED | OUTPATIENT
Start: 2021-02-24 | End: 2021-05-19

## 2021-02-24 RX ORDER — ACETAMINOPHEN 325 MG/1
650 TABLET ORAL EVERY 6 HOURS SCHEDULED
Qty: 30 TABLET | Refills: 0
Start: 2021-02-24 | End: 2022-04-04

## 2021-02-24 RX ORDER — IBUPROFEN 600 MG/1
600 TABLET ORAL EVERY 6 HOURS SCHEDULED
Qty: 30 TABLET | Refills: 0
Start: 2021-02-24 | End: 2022-04-04

## 2021-02-24 RX ORDER — OXYCODONE HYDROCHLORIDE 5 MG/1
5 TABLET ORAL EVERY 8 HOURS PRN
Qty: 5 TABLET | Refills: 0 | Status: CANCELLED | OUTPATIENT
Start: 2021-02-24

## 2021-02-24 RX ORDER — DESOGESTREL AND ETHINYL ESTRADIOL 0.15-0.03
1 KIT ORAL DAILY
Qty: 60 TABLET | Refills: 2 | Status: CANCELLED | OUTPATIENT
Start: 2021-02-24

## 2021-02-24 RX ADMIN — OXYCODONE HYDROCHLORIDE 10 MG: 10 TABLET, FILM COATED, EXTENDED RELEASE ORAL at 08:55

## 2021-02-24 RX ADMIN — IBUPROFEN 600 MG: 600 TABLET ORAL at 12:14

## 2021-02-24 RX ADMIN — ACETAMINOPHEN 650 MG: 325 TABLET ORAL at 03:52

## 2021-02-24 RX ADMIN — IBUPROFEN 600 MG: 600 TABLET ORAL at 06:05

## 2021-02-24 RX ADMIN — ACETAMINOPHEN 650 MG: 325 TABLET ORAL at 08:55

## 2021-02-24 RX ADMIN — SODIUM CHLORIDE, SODIUM LACTATE, POTASSIUM CHLORIDE, AND CALCIUM CHLORIDE 125 ML/HR: .6; .31; .03; .02 INJECTION, SOLUTION INTRAVENOUS at 03:52

## 2021-02-24 RX ADMIN — IBUPROFEN 600 MG: 600 TABLET ORAL at 00:06

## 2021-02-24 NOTE — DISCHARGE INSTRUCTIONS
Endometriosis   WHAT YOU NEED TO KNOW:   Endometriosis is a condition in which tissue that is normally only in your uterus grows outside of the uterus  Endometriosis causes tissue that should be shed during a monthly period to grow on your ovaries, fallopian tubes, bladder, or other organs  Organs and tissue may stick together and cause inflammation and pain  DISCHARGE INSTRUCTIONS:   Seek care immediately if:   · You have severe pain that does not go away after you take pain medicine  Contact your healthcare provider if:   · Your symptoms return after treatment  · You have heavy or unusual vaginal bleeding  · You see blood in your urine or bowel movement  · You have questions or concerns about your condition or care  Medicines:   · Hormones  may help shrink endometrial tissue and decrease pain and inflammation  You may be given birth control pills, androgen hormones, or medicine that makes your body produce less of certain hormones  · Acetaminophen  decreases pain and is available without a doctor's order  Ask how much to take and how often to take it  Follow directions  Acetaminophen can cause liver damage if not taken correctly  · NSAIDs , such as ibuprofen, help decrease swelling, pain, and fever  This medicine is available with or without a doctor's order  NSAIDs can cause stomach bleeding or kidney problems in certain people  If you take blood thinner medicine, always ask your healthcare provider if NSAIDs are safe for you  Always read the medicine label and follow directions  · Take your medicine as directed  Contact your healthcare provider if you think your medicine is not helping or if you have side effects  Tell him or her if you are allergic to any medicine  Keep a list of the medicines, vitamins, and herbs you take  Include the amounts, and when and why you take them  Bring the list or the pill bottles to follow-up visits   Carry your medicine list with you in case of an emergency  Self-care:   · Apply heat  on your abdomen for 20 to 30 minutes every 2 hours for as many days as directed  Heat helps decrease pain and muscle spasms  · Exercise regularly  to help reduce symptoms, such as pain  Ask about the best exercise plan for you  For support and more information:   · [employer]  [address] [place] [address]  Phone: [phone]  Phone: [phone]  Web Address: http://[employer]    Follow up with your healthcare provider as directed:  Write down your questions so you remember to ask them during your visits  © Copyright [facility] [employer] Information is for End User's use only and may not be sold, redistributed or otherwise used for commercial purposes  All illustrations and images included in CareNotes® are the copyrighted property of A D A M , Inc  or [employer]  The above information is an  only  It is not intended as medical advice for individual conditions or treatments  Talk to your doctor, nurse or pharmacist before following any medical regimen to see if it is safe and effective for you  Desogestrel/Ethinyl Estradiol (Apri, Bekyree, KUOPIO, Desogen) - (By mouth)   Why this medicine is used:   Prevents pregnancy  Regulation of endometriosis  Contact a nurse or doctor right away if you have:  · Trouble breathing or coughing up blood  · Chest pain, unusual sweating, nausea, fainting  · Vision loss, double vision  · Heavy vaginal bleeding, irregular or missed menstrual period  · Dark urine or pale stools, loss of appetite, stomach pain, yellow skin or eyes  · Numbness or weakness, pain in your lower leg, headache     Common side effects:  · Depression, mood changes  © Copyright [facility] [employer] Information is for End User's use only and may not be sold, redistributed or otherwise used for commercial purposes

## 2021-02-24 NOTE — UTILIZATION REVIEW
Notification of Inpatient Admission/Inpatient Authorization Request   This is a Notification of Inpatient Admission for [address]  Be advised that this patient was admitted to our facility under Inpatient Status  Contact Christen Stearns at [phone] for additional admission information  [facility] UR DEPT  DEDICATED -[phone]  Patient Name:   Lisset France   YOB: 1990       [address]   [address]:   [facility]  Tax ID: [id number]  NPI: [id number] Attending Provider/NPI:  Phone:  Address: [place], [place] [[id number]]  [phone]  Same as the facility   Place of Service Code: 24 Place of Service Name:  [address] [place] [address]   Start Date: 2/23/21 6842 Discharge Date & Time: No discharge date for patient encounter  Type of Admission: Inpatient Status Discharge Disposition   (if discharged): Home/Self Care   Patient Diagnoses: Pelvic pain [R10 2]  Intractable pain [R52]     Orders: Admission Orders (From admission, onward)     Ordered        02/23/21 0701  Place in Observation  Once         02/23/21 0702  INPATIENT ADMISSION  Once                    Assigned Utilization Review Contact: Ganesh Wang  Utilization   Network Utilization Review Department  Phone: [phone]; Fax [phone]  Email: [email or web address]   ATTENTION PAYERS: Please call the assigned Utilization  directly with any questions or concerns ALL voicemails in the department are confidential  Send all requests for admission clinical reviews, approved or denied determinations and any other requests to dedicated fax number belonging to the campus where the patient is receiving treatment

## 2021-02-24 NOTE — PROGRESS NOTES
Progress Note - OB/GYN   Sara Solomon 31 y o  female MRN: [id number]  Unit/Bed#: E5 -01 Encounter: [id number]    ASSESSMENT:  Sara Solomon is a 31 y o  [id number] female with hx endometriosis/pelvic pain, s/p RSO in Dec 2020, admitted for observation in the setting of pelvic pain 2/2 new endometriomas on ultrasound  Patient has also been struggling with infertility  Thankfully she was able to do a virtual visit with Dr Medina Baptiste yesterday  Today, her pain is improved and her bleeding is stable  PLAN:  1  Pelvic Pain  - Tylenol 650 mg Q6h sergo  - Motrin 600 mg Q6h sergo   - Oxycontin 10 mg Q12h sergo   - Encouraged sergo Tylenol/Motrin as baseline pain regimen on discharge   - Will send few tabs of Roxicodone as needed until she can be seen in the office for follow up     2  Endometriosis   - Per consultation with DAVID Dr Medina Baptiste, will start OCPs on discharge  Will send Rx to pharmacy     3  Infertility   - Per consultation with DAVID Dr Medina Baptiste, will start OCPs on discharge  Will send Rx to pharmacy   - Pt will work on obtaining insurance prior to office visit with Dr Medina Baptiste  - Pt will likely undergo IVF     Disposition: Anticipate discharge today with close follow up in the office  SUBJECTIVE:  Pt was able to sleep overnight  Pain this AM was 2/10  Her pain is much improved from yesterday  She is tolerating PO intake  She is passing flatus  She is voiding  She denies fever, chills, nausea/vomiting  She is currently on her menses, which clinically correlates to her severe onset of endometriosis pain and gradual improvement  Her bleeding is WNL  OBJECTIVE:   Vitals:    02/24/21 0001   BP: 119/72   Pulse: 80   Resp: 18   Temp: (!) 97 °F (36 1 °C)   SpO2: 99%     Physical Exam:   Body mass index is 34 95 kg/m²  Constitutional: Well-developed, well-nourished  NAD  HEENT: NC/AT  Conjunctivae normal    Cardiovascular: RRR, S1/S2 normal    Pulmonary: Normal respiratory effort     Abdominal: Soft, non-distended  Slight tenderness to palpation in suprapubic region, much improved from yesterday  MSK: Normal range of motion  Extremities: non-tender  Neurological: Alert and oriented to person, place, and time  Skin: Skin is warm and dry       I/O       02/22 0701 - 02/23 0700 02/23 0701 - 02/24 0700 02/24 0701 - 02/25 0700    I V  (mL/kg)  1900 (21 2)     Total Intake(mL/kg)  1900 (21 2)     Net  +1900                Lab Results   Component Value Date    WBC 8 50 02/24/2021    HGB 8 3 (L) 02/24/2021    HCT 28 8 (L) 02/24/2021    MCV 70 (L) 02/24/2021     02/24/2021       Robert Aguirre MD  PGY-2, OB/GYN  2/24/2021 7:27 AM

## 2021-02-24 NOTE — UTILIZATION REVIEW
Notification of Discharge  This is a Notification of Discharge from our facility [address]  Please be advised that this patient has been discharge from our facility  Below you will find the admission and discharge date and time including the patients disposition  PRESENTATION DATE: 2/23/2021  5:00 AM  OBS ADMISSION DATE:   IP ADMISSION DATE: 2/23/21 0702   DISCHARGE DATE: 2/24/2021  2:54 PM  DISPOSITION: Home/Self Care Home/Self Care   Admission Orders listed below:  Admission Orders (From admission, onward)     Ordered        02/23/21 0701  Place in Observation  Once         02/23/21 0702  INPATIENT ADMISSION  Once                   Please contact the UR Department if additional information is required to close this patient's authorization/case  Bob Mack Utilization Review Department  Main: [phone] x carefully listen to the prompts  All voicemails are confidential   [email or web address]  Send all requests for admission clinical reviews, approved or denied determinations and any other requests to dedicated fax number below belonging to the campus where the patient is receiving treatment   List of dedicated fax numbers:  [address] DENIALS (Administrative/Medical Necessity) [phone]   [address] (Maternity/NICU/Pediatrics) [phone]   [facility] [phone]   Jordan Gomez [phone]   Aby Bullard [phone]   Katharina Dumont [facility] [address] [facility] [phone]   [facility]  [phone]   [address] [facility] [address]  [address] [facility] [address] [facility] [phone]

## 2021-02-24 NOTE — UTILIZATION REVIEW
Initial Clinical Review    Admission: Date/Time/Statement:   Admission Orders (From admission, onward)     Ordered        02/23/21 0701  Place in Observation  Once         02/23/21 0702  INPATIENT ADMISSION  Once                   Orders Placed This Encounter   Procedures    Place in Observation     Standing Status:   Standing     Number of Occurrences:   1     Order Specific Question:   Level of Care     Answer:   Med Surg [16]    INPATIENT ADMISSION     Standing Status:   Standing     Number of Occurrences:   1     Order Specific Question:   Level of Care     Answer:   Med Surg [16]     Order Specific Question:   Estimated length of stay     Answer:   More than 2 Midnights     Order Specific Question:   Certification     Answer:   I certify that inpatient services are medically necessary for this patient for a duration of greater than two midnights  See H&P and MD Progress Notes for additional information about the patient's course of treatment  ED Arrival Information     Expected Arrival Acuity Means of Arrival Escorted By Service Admission Type    - 2/23/2021 04:53 Urgent [facility]In [facility] OB/GYN Urgent    Arrival Complaint    Abdominal pain        Chief Complaint   Patient presents with    Pelvic Pain     Pt reports pelvic pain for several days  Pt states she was seen in ED yesterday for same s/s but pain is worsening  Assessment/Plan:    31  Y O female presents to ED from home with worsening lower abdominal and pelvic pain for past few days  Was seen  In ED the day prior to admission for same problem  U/S  Then showed endometriosis, did start her period 2/22,  LMP  Was 2/4  Was unable to fill Rx  Due to weather  Periods very heavy with clots since  RSO  12/20  Repeat  U/S ( 2/23)  Shows stable endometriosis, decrease  In size of ovarian cyst   Pregnancy test and U/A  Negative   Admit  IP with  Lower pelvic pain and plan is  Monitor labs, pain control, IVF and  Possible surgical intervention  2/24  No surgical intervention at this time  Pain improved, bleeding stable  Continue  Pain control as needed  Continue to monitor labs  Tolerating po   + flatus        ED Triage Vitals   Temperature Pulse Respirations Blood Pressure SpO2   02/23/21 0505 02/23/21 0505 02/23/21 0505 02/23/21 0505 02/23/21 0505   97 6 °F (36 4 °C) 101 (!) 24 (!) 155/101 97 %      Temp Source Heart Rate Source Patient Position - Orthostatic VS BP Location FiO2 (%)   02/23/21 0505 02/23/21 0505 02/23/21 0729 02/23/21 0505 --   Oral Monitor Lying Right arm       Pain Score       02/23/21 0739       No Pain          Wt Readings from Last 1 Encounters:   02/23/21 89 5 kg (197 lb 5 oz)     Additional Vital Signs:   02/24/21 0731  97 5 °F (36 4 °C)  84  18  119/76  99 %  --  Lying   02/24/21 0001  97 °F (36 1 °C)Abnormal   80  18  119/72  99 %  None (Room air)  Lying   02/23/21 1519  --  96  16  114/59  99 %  None (Room air)  Sitting   02/23/21 1059  --  79  16  111/63  100 %  None (Room air)  Lying   02/23/21 0729  --  91  18  124/67  98 %  None (Room air)  Lying   02/23/21 0505  97 6 °F (36 4 °C)  101  24Abnormal   155/101Abnormal   97 %  None (Room air)  --         Pertinent Labs/Diagnostic Test Results:   U/S  Pelvis  ( 2/23)    Decrease in size of left ovarian cyst with adjacent complex fluid   Findings most suggestive of cyst rupture with adjacent hemorrhage        2 lesions again noted in association with the lower uterine segment likely representing endometriomas  U/S  Pelvis  ( 2/22)      6 x 2 7 x 4 2 cm simple left ovarian cyst    2   No evidence of left adnexal torsion  3   3 1 x 3 9 cm anterior uterine leiomyoma     4   Two masses, typical of endometriomas, adjacent to the anterior wall of the lower uterine segment   These measure 3 4 x 3 3 x 3 9 cm and 3 2 x 2 2 x 3 6 cm     Ct abd/pelvis ( 2/22)   4 7 x 2 8 x 3 9 simple appearing left ovarian cyst with adjacent free fluid   While this is likely a simple appearing cyst, given the patient's symptoms, the possibility of infection or ovarian torsion should be considered      Results from last 7 days   Lab Units 02/24/21  0429 02/23/21  0728 02/22/21  0706   WBC Thousand/uL 8 50 10 02 9 09   HEMOGLOBIN g/dL 8 3* 9 5* 9 9*   HEMATOCRIT % 28 8* 32 6* 34 6*   PLATELETS Thousands/uL 284 316 390   NEUTROS ABS Thousands/µL  --   --  5 46         Results from last 7 days   Lab Units 02/22/21  0706   SODIUM mmol/L 137   POTASSIUM mmol/L 3 3*   CHLORIDE mmol/L 104   CO2 mmol/L 24   ANION GAP mmol/L 9   BUN mg/dL 5   CREATININE mg/dL 0 57*   EGFR ml/min/1 73sq m 124   CALCIUM mg/dL 8 4             Results from last 7 days   Lab Units 02/22/21  0706   GLUCOSE RANDOM mg/dL 105               Results from last 7 days   Lab Units 02/22/21  0720   CLARITY UA  Clear   COLOR UA  Yellow   SPEC GRAV UA  1 025   PH UA  6 5   GLUCOSE UA mg/dl Negative   KETONES UA mg/dl Negative   BLOOD UA  Trace*   PROTEIN UA mg/dl Negative   NITRITE UA  Negative   BILIRUBIN UA  Negative   UROBILINOGEN UA E U /dl 0 2   LEUKOCYTES UA  Small*   WBC UA /hpf 4-10*   RBC UA /hpf None Seen   BACTERIA UA /hpf Occasional   EPITHELIAL CELLS WET PREP /hpf Occasional         ED Treatment:   Medication Administration from 02/23/2021 0453 to 02/23/2021 1607       Date/Time Order Dose Route Action Comments     02/23/2021 0533 oxyCODONE (OxyCONTIN) 12 hr tablet 10 mg 10 mg Oral Given      02/23/2021 0898 lactated ringers infusion 125 mL/hr Intravenous New Bag      02/23/2021 1402 ketorolac (TORADOL) injection 30 mg 30 mg Intravenous Given      02/23/2021 0835 ketorolac (TORADOL) injection 30 mg 30 mg Intravenous Given         Present on Admission:   Endometriosis      Admitting Diagnosis: Pelvic pain [R10 2]  Intractable pain [R52]  Age/Sex: 31 y o  female  Admission Orders:  Scheduled Medications:  acetaminophen, 650 mg, Oral, Q6H RADHIKA  ibuprofen, 600 mg, Oral, Q6H RADHIKA  oxyCODONE, 10 mg, Oral, Q12H [address]      Continuous IV Infusions:  lactated ringers, 125 mL/hr, Intravenous, Continuous      PRN Meds:       IP CONSULT TO OB GYN    Network Utilization Review Department  ATTENTION: Please call with any questions or concerns to [phone] and carefully listen to the prompts so that you are directed to the right person  All voicemails are confidential   Johnson Alice all requests for admission clinical reviews, approved or denied determinations and any other requests to dedicated fax number below belonging to the campus where the patient is receiving treatment   List of dedicated fax numbers for the Facilities:  [address] DENIALS (Administrative/Medical Necessity) [phone]   [address] (Maternity/NICU/Pediatrics) [phone]   [address] Disha Hassan 5727 (  Rigo Be "[address]) [address] [place] [address] Evelin Afshan Gaffney [address] [place] [address] [phone]

## 2021-05-18 DIAGNOSIS — N80.9 ENDOMETRIOSIS: ICD-10-CM

## 2021-05-19 RX ORDER — DESOGESTREL AND ETHINYL ESTRADIOL 0.15-0.03
KIT ORAL
Qty: 28 TABLET | Refills: 0 | Status: SHIPPED | OUTPATIENT
Start: 2021-05-19 | End: 2021-08-02

## 2021-07-30 DIAGNOSIS — N80.9 ENDOMETRIOSIS: ICD-10-CM

## 2021-08-02 RX ORDER — DESOGESTREL AND ETHINYL ESTRADIOL 0.15-0.03
KIT ORAL
Qty: 28 TABLET | Refills: 0 | Status: SHIPPED | OUTPATIENT
Start: 2021-08-02 | End: 2021-09-02

## 2021-09-02 DIAGNOSIS — N80.9 ENDOMETRIOSIS: ICD-10-CM

## 2021-09-02 RX ORDER — DESOGESTREL AND ETHINYL ESTRADIOL 0.15-0.03
KIT ORAL
Qty: 28 TABLET | Refills: 0 | Status: SHIPPED | OUTPATIENT
Start: 2021-09-02 | End: 2021-10-01

## 2021-09-04 ENCOUNTER — IMMUNIZATIONS (OUTPATIENT)
Dept: FAMILY MEDICINE CLINIC | Facility: HOSPITAL | Age: 31
End: 2021-09-04

## 2021-09-04 DIAGNOSIS — Z23 ENCOUNTER FOR IMMUNIZATION: Primary | ICD-10-CM

## 2021-09-04 PROCEDURE — 0001A SARS-COV-2 / COVID-19 MRNA VACCINE (PFIZER-BIONTECH) 30 MCG: CPT

## 2021-09-04 PROCEDURE — 91300 SARS-COV-2 / COVID-19 MRNA VACCINE (PFIZER-BIONTECH) 30 MCG: CPT

## 2021-09-25 ENCOUNTER — IMMUNIZATIONS (OUTPATIENT)
Dept: FAMILY MEDICINE CLINIC | Facility: HOSPITAL | Age: 31
End: 2021-09-25

## 2021-09-25 DIAGNOSIS — Z23 ENCOUNTER FOR IMMUNIZATION: Primary | ICD-10-CM

## 2021-09-25 PROCEDURE — 91300 SARS-COV-2 / COVID-19 MRNA VACCINE (PFIZER-BIONTECH) 30 MCG: CPT

## 2021-09-25 PROCEDURE — 0002A SARS-COV-2 / COVID-19 MRNA VACCINE (PFIZER-BIONTECH) 30 MCG: CPT

## 2021-10-01 DIAGNOSIS — N80.9 ENDOMETRIOSIS: ICD-10-CM

## 2021-10-01 RX ORDER — DESOGESTREL AND ETHINYL ESTRADIOL 0.15-0.03
KIT ORAL
Qty: 28 TABLET | Refills: 0 | Status: SHIPPED | OUTPATIENT
Start: 2021-10-01 | End: 2021-10-25

## 2021-10-19 ENCOUNTER — HOSPITAL ENCOUNTER (EMERGENCY)
Facility: HOSPITAL | Age: 31
Discharge: HOME | End: 2021-10-19
Attending: EMERGENCY MEDICINE
Payer: COMMERCIAL

## 2021-10-19 VITALS
WEIGHT: 196.43 LBS | TEMPERATURE: 98.4 F | BODY MASS INDEX: 34.8 KG/M2 | OXYGEN SATURATION: 100 % | SYSTOLIC BLOOD PRESSURE: 160 MMHG | HEART RATE: 94 BPM | DIASTOLIC BLOOD PRESSURE: 86 MMHG | RESPIRATION RATE: 20 BRPM

## 2021-10-19 DIAGNOSIS — L03.114 CELLULITIS OF LEFT UPPER ARM: Primary | ICD-10-CM

## 2021-10-19 PROCEDURE — 99283 EMERGENCY DEPT VISIT LOW MDM: CPT

## 2021-10-19 PROCEDURE — 99284 EMERGENCY DEPT VISIT MOD MDM: CPT | Performed by: PHYSICIAN ASSISTANT

## 2021-10-19 PROCEDURE — 96372 THER/PROPH/DIAG INJ SC/IM: CPT

## 2021-10-19 RX ORDER — CEPHALEXIN 250 MG/1
500 CAPSULE ORAL ONCE
Status: COMPLETED | OUTPATIENT
Start: 2021-10-19 | End: 2021-10-19

## 2021-10-19 RX ORDER — KETOROLAC TROMETHAMINE 30 MG/ML
15 INJECTION, SOLUTION INTRAMUSCULAR; INTRAVENOUS ONCE
Status: COMPLETED | OUTPATIENT
Start: 2021-10-19 | End: 2021-10-19

## 2021-10-19 RX ORDER — CEPHALEXIN 500 MG/1
500 CAPSULE ORAL EVERY 6 HOURS SCHEDULED
Qty: 28 CAPSULE | Refills: 0 | Status: SHIPPED | OUTPATIENT
Start: 2021-10-19 | End: 2021-10-26

## 2021-10-19 RX ADMIN — CEPHALEXIN 500 MG: 250 CAPSULE ORAL at 10:16

## 2021-10-19 RX ADMIN — KETOROLAC TROMETHAMINE 15 MG: 30 INJECTION, SOLUTION INTRAMUSCULAR at 10:16

## 2021-10-25 DIAGNOSIS — N80.9 ENDOMETRIOSIS: ICD-10-CM

## 2021-10-25 RX ORDER — DESOGESTREL AND ETHINYL ESTRADIOL 0.15-0.03
KIT ORAL
Qty: 28 TABLET | Refills: 0 | Status: SHIPPED | OUTPATIENT
Start: 2021-10-25 | End: 2021-11-24

## 2021-11-23 DIAGNOSIS — N80.9 ENDOMETRIOSIS: ICD-10-CM

## 2021-11-24 RX ORDER — DESOGESTREL AND ETHINYL ESTRADIOL 0.15-0.03
KIT ORAL
Qty: 28 TABLET | Refills: 0 | Status: SHIPPED | OUTPATIENT
Start: 2021-11-24 | End: 2021-12-30

## 2022-04-04 ENCOUNTER — ANNUAL EXAM (OUTPATIENT)
Dept: OBGYN CLINIC | Facility: CLINIC | Age: 32
End: 2022-04-04

## 2022-04-04 VITALS
HEIGHT: 65 IN | SYSTOLIC BLOOD PRESSURE: 142 MMHG | HEART RATE: 97 BPM | WEIGHT: 204 LBS | DIASTOLIC BLOOD PRESSURE: 87 MMHG | BODY MASS INDEX: 33.99 KG/M2

## 2022-04-04 DIAGNOSIS — Z12.4 SCREENING FOR CERVICAL CANCER: ICD-10-CM

## 2022-04-04 DIAGNOSIS — Z12.39 ENCOUNTER FOR BREAST CANCER SCREENING USING NON-MAMMOGRAM MODALITY: ICD-10-CM

## 2022-04-04 DIAGNOSIS — N80.9 ENDOMETRIOSIS: ICD-10-CM

## 2022-04-04 DIAGNOSIS — Z01.419 ENCOUNTER FOR GYNECOLOGICAL EXAMINATION WITHOUT ABNORMAL FINDING: Primary | ICD-10-CM

## 2022-04-04 PROBLEM — A64 STD (SEXUALLY TRANSMITTED DISEASE): Status: RESOLVED | Noted: 2018-06-18 | Resolved: 2022-04-04

## 2022-04-04 PROBLEM — N70.11 HYDROSALPINX: Status: RESOLVED | Noted: 2020-10-21 | Resolved: 2022-04-04

## 2022-04-04 PROBLEM — Z72.51 HIGH RISK SEXUAL BEHAVIOR: Status: RESOLVED | Noted: 2018-06-18 | Resolved: 2022-04-04

## 2022-04-04 PROBLEM — N73.0 PID (ACUTE PELVIC INFLAMMATORY DISEASE): Status: RESOLVED | Noted: 2020-10-04 | Resolved: 2022-04-04

## 2022-04-04 PROBLEM — N83.201 HEMORRHAGIC CYST OF RIGHT OVARY: Status: RESOLVED | Noted: 2020-10-21 | Resolved: 2022-04-04

## 2022-04-04 PROCEDURE — 99395 PREV VISIT EST AGE 18-39: CPT | Performed by: NURSE PRACTITIONER

## 2022-04-04 PROCEDURE — 0503F POSTPARTUM CARE VISIT: CPT | Performed by: NURSE PRACTITIONER

## 2022-04-04 PROCEDURE — G0476 HPV COMBO ASSAY CA SCREEN: HCPCS | Performed by: NURSE PRACTITIONER

## 2022-04-04 PROCEDURE — G0145 SCR C/V CYTO,THINLAYER,RESCR: HCPCS | Performed by: NURSE PRACTITIONER

## 2022-04-04 RX ORDER — DESOGESTREL AND ETHINYL ESTRADIOL 0.15-0.03
1 KIT ORAL DAILY
Qty: 28 TABLET | Refills: 12 | Status: SHIPPED | OUTPATIENT
Start: 2022-04-04

## 2022-04-04 NOTE — LETTER
2022    To Sonya Mejia  : 1990      This letter is to advise you that your recent PAP SMEAR results were reviewed by me and are NORMAL    We will see you in 1 year for your annual exam     ARISTIDES Hartmann

## 2022-04-04 NOTE — PROGRESS NOTES
Jazmin Diaz is a 27 y o  female who presents today for annual GYN exam   Her last pap smear was performed 2018 and result was NILM  She reports no history of abnormal pap smears in her past   She reports menses as regular  Patient's last menstrual period was 2022  Her general medical history has been reviewed and she reports it as follows:    Past Medical History:   Diagnosis Date    Endometriosis      Past Surgical History:   Procedure Laterality Date    CYSTOSCOPY N/A 2020    Procedure: CYSTOSCOPY;  Surgeon: Lizeth Powers DO;  Location[facility];  Service: Gynecology    OOPHORECTOMY Right 2020    Procedure: Cliff Dotter;  Surgeon: Lizeth Powers DO;  [facility];  Service: Gynecology    PELVIC LAPAROSCOPY Bilateral 2020    Procedure: CYSTECTOMY OVARIAN, LAPAROSCOPIC;  Surgeon: Lizeth Powers DO;  [facility];  Service: Gynecology    MD LAP,DIAGNOSTIC ABDOMEN N/A 2020    Procedure: LAPAROSCOPY DIAGNOSTIC;  Surgeon: Lizeth Powers DO;  [facility];  Service: Gynecology    MD LAP,RMV  ADNEXAL STRUCTURE Right 2020    Procedure: SALPINGECTOMY, LAPAROSCOPIC;  Surgeon: Lizeth Powers DO;  Location[facility];  Service: Gynecology     OB History        6    Para   3    Term   3       0    AB   3    Living   3       SAB   3    IAB   0    Ectopic   0    Multiple   0    Live Births   3               Social History     Tobacco Use    Smoking status: Never Smoker    Smokeless tobacco: Never Used   Vaping Use    Vaping Use: Never used   Substance Use Topics    Alcohol use: Yes     Comment: couple times/month    Drug use: Never     Social History     Substance and Sexual Activity   Sexual Activity Yes    Partners: Male    Birth control/protection: OCP     Cancer-related family history includes Cancer (age of onset: 48) in her mother   There is no history of Breast cancer, Colon cancer, or Ovarian cancer  Current Outpatient Medications   Medication Instructions    Apri 0 15-30 MG-MCG per tablet TAKE ONE TABLET BY MOUTH EVERY DAY       Review of Systems:  Review of Systems   Constitutional: Negative  Gastrointestinal: Negative  Genitourinary: Negative for difficulty urinating, menstrual problem, pelvic pain and vaginal discharge  Skin: Negative  Physical Exam:  /87   Pulse 97   Ht 5' 5" (1 651 m)   Wt 92 5 kg (204 lb)   LMP 03/23/2022   BMI 33 95 kg/m²   Physical Exam  Constitutional:       General: She is not in acute distress  Appearance: She is well-developed  Genitourinary:      Vulva normal       No lesions in the vagina  Right Adnexa: not tender and no mass present  Left Adnexa: not tender and no mass present  No cervical motion tenderness or lesion  Uterus is not tender  Breasts:      Right: No mass, nipple discharge, skin change or tenderness  Left: No mass, nipple discharge, skin change or tenderness  Neck:      Thyroid: No thyromegaly  Cardiovascular:      Rate and Rhythm: Normal rate and regular rhythm  Pulmonary:      Effort: Pulmonary effort is normal    Abdominal:      Palpations: Abdomen is soft  Tenderness: There is no abdominal tenderness  Musculoskeletal:      Cervical back: Neck supple  Neurological:      Mental Status: She is alert and oriented to person, place, and time  Skin:     General: Skin is warm and dry  Vitals reviewed  Assessment/Plan:   1  Normal well-woman GYN exam   2  Cervical cancer screening:  Normal cervical exam   Pap smear done with HPV co-testing  3  STD screening:  Patient declines  4  Breast cancer screening:  Normal breast exam   Reviewed breast self-awareness  5  Depression Screening: Patient's depression screening was assessed with a PHQ-2 score of 0  Their PHQ-9 score was 0  Clinically patient does not have depression   No treatment is required  6  BMI Counseling: Body mass index is 33 95 kg/m²  Discussed the patient's BMI with her  The BMI is above normal  Patient referred to PCP due to patient being obese  7  Contraception:  OCP's  Given Rx refills for another year  8  Return to office in 1 year for annual GYN exam     Reviewed with patient that test results are available in [address] immediately, but that they will not necessarily be reviewed by me immediately  Explained that I will review results at my earliest opportunity and contact patient appropriately

## 2022-04-04 NOTE — PATIENT INSTRUCTIONS
Thank you for your confidence in our team    We appreciate you and welcome your feedback  If you receive a survey from us, please take a few moments to let us know how we are doing  Sincerely,  Daryll Osgood, CRNP        OBESITY     Obesity is defined as a body mass index (BMI) which is greater than 30  Your Body mass index is 33 95 kg/m²       The risks of obesity include  many health problems, such as injuries or physical disability  You may need tests to check for the following:  · Diabetes     · High blood pressure or high cholesterol     · Heart disease     · Gallbladder or liver disease     · Cancer of the colon, breast, prostate, liver, or kidney     · Sleep apnea     · Arthritis or gout    Seek care immediately if:   · You have a severe headache, confusion, or difficulty speaking  · You have weakness on one side of your body  · You have chest pain, sweating, or shortness of breath  Contact your healthcare provider if:   · You have symptoms of gallbladder or liver disease, such as pain in your upper abdomen  · You have knee or hip pain and discomfort while walking  · You have symptoms of diabetes, such as intense hunger and thirst, and frequent urination  · You have symptoms of sleep apnea, such as snoring or daytime sleepiness  · You have questions or concerns about your condition or care  Treatment for obesity  focuses on helping you lose weight to improve your health  Even a small decrease in BMI can reduce the risk for many health problems  Your healthcare provider will help you set a weight-loss goal   · Lifestyle changes  are the first step in treating obesity  These include making healthy food choices and getting regular physical activity  Your healthcare provider may suggest a weight-loss program that involves coaching, education, and therapy  · Medicine  may help you lose weight when it is used with a healthy diet and physical activity       · Surgery  can help you lose weight if you are very obese and have other health problems  There are several types of weight-loss surgery  Ask your healthcare provider for more information  Be successful losing weight:   · Set small, realistic goals  An example of a small goal is to walk for 20 minutes 5 days a week  Mauricio goal is to lose 5% of your body weight  · Tell friends, family members, and coworkers about your goals  and ask for their support  Ask a friend to lose weight with you, or join a weight-loss support group  · Identify foods or triggers that may cause you to overeat , and find ways to avoid them  Remove tempting high-calorie foods from your home and workplace  Place a bowl of fresh fruit on your kitchen counter  If stress causes you to eat, then find other ways to cope with stress  · Keep a diary to track what you eat and drink  Also write down how many minutes of physical activity you do each day  Weigh yourself once a week and record it in your diary  Eating changes: You will need to eat 500 to 1,000 fewer calories each day than you currently eat to lose 1 to 2 pounds a week  The following changes will help you cut calories:  · Eat smaller portions  Use small plates, no larger than 9 inches in diameter  Fill your plate half full of fruits and vegetables  Measure your food using measuring cups until you know what a serving size looks like  · Eat 3 meals and 1 or 2 snacks each day  Plan your meals in advance  Dominga Ross and eat at home most of the time  Eat slowly  · Eat fruits and vegetables at every meal   They are low in calories and high in fiber, which makes you feel full  Do not add butter, margarine, or cream sauce to vegetables  Use herbs to season steamed vegetables  · Eat less fat and fewer fried foods  Eat more baked or grilled chicken and fish  These protein sources are lower in calories and fat than red meat  Limit fast food   Dress your salads with olive oil and vinegar instead of bottled dressing  · Limit the amount of sugar you eat  Do not drink sugary beverages  Limit alcohol  Activity changes:  Physical activity is good for your body in many ways  It helps you burn calories and build strong muscles  It decreases stress and depression, and improves your mood  It can also help you sleep better  Talk to your healthcare provider before you begin an exercise program   · Exercise for at least 30 minutes 5 days a week  Start slowly  Set aside time each day for physical activity that you enjoy and that is convenient for you  It is best to do both weight training and an activity that increases your heart rate, such as walking, bicycling, or swimming  · Find ways to be more active  Do yard work and housecleaning  Walk up the stairs instead of using elevators  Spend your leisure time going to events that require walking, such as outdoor festivals or fairs  This extra physical activity can help you lose weight and keep it off  Follow up with your primary healthcare provider as directed  You may need to meet with a dietitian  Write down your questions so you remember to ask them during your visits

## 2022-04-05 LAB
HPV HR 12 DNA CVX QL NAA+PROBE: NEGATIVE
HPV16 DNA CVX QL NAA+PROBE: NEGATIVE
HPV18 DNA CVX QL NAA+PROBE: NEGATIVE

## 2022-04-11 LAB
LAB AP GYN PRIMARY INTERPRETATION: NORMAL
Lab: NORMAL

## 2022-09-20 ENCOUNTER — APPOINTMENT (EMERGENCY)
Dept: CT IMAGING | Facility: HOSPITAL | Age: 32
End: 2022-09-20
Payer: MEDICARE

## 2022-09-20 ENCOUNTER — HOSPITAL ENCOUNTER (EMERGENCY)
Facility: HOSPITAL | Age: 32
Discharge: HOME | End: 2022-09-20
Attending: EMERGENCY MEDICINE
Payer: MEDICARE

## 2022-09-20 VITALS
OXYGEN SATURATION: 100 % | RESPIRATION RATE: 20 BRPM | TEMPERATURE: 98.5 F | DIASTOLIC BLOOD PRESSURE: 101 MMHG | HEART RATE: 92 BPM | SYSTOLIC BLOOD PRESSURE: 152 MMHG

## 2022-09-20 DIAGNOSIS — K42.9 UMBILICAL HERNIA: ICD-10-CM

## 2022-09-20 DIAGNOSIS — N83.202 LEFT OVARIAN CYST: ICD-10-CM

## 2022-09-20 DIAGNOSIS — R93.89 ABNORMAL CT SCAN: ICD-10-CM

## 2022-09-20 DIAGNOSIS — R10.33 PERIUMBILICAL PAIN: Primary | ICD-10-CM

## 2022-09-20 LAB
ALBUMIN SERPL BCP-MCNC: 3.4 G/DL (ref 3.5–5)
ALP SERPL-CCNC: 44 U/L (ref 46–116)
ALT SERPL W P-5'-P-CCNC: 19 U/L (ref 12–78)
ANION GAP SERPL CALCULATED.3IONS-SCNC: 9 MMOL/L (ref 4–13)
AST SERPL W P-5'-P-CCNC: 12 U/L (ref 5–45)
BASOPHILS # BLD AUTO: 0.05 THOUSANDS/ΜL (ref 0–0.1)
BASOPHILS NFR BLD AUTO: 1 % (ref 0–1)
BILIRUB SERPL-MCNC: 0.27 MG/DL (ref 0.2–1)
BILIRUB UR QL STRIP: NEGATIVE
BUN SERPL-MCNC: 11 MG/DL (ref 5–25)
CALCIUM ALBUM COR SERPL-MCNC: 9.1 MG/DL (ref 8.3–10.1)
CALCIUM SERPL-MCNC: 8.6 MG/DL (ref 8.3–10.1)
CHLORIDE SERPL-SCNC: 106 MMOL/L (ref 96–108)
CLARITY UR: NORMAL
CO2 SERPL-SCNC: 27 MMOL/L (ref 21–32)
COLOR UR: YELLOW
CREAT SERPL-MCNC: 0.62 MG/DL (ref 0.6–1.3)
EOSINOPHIL # BLD AUTO: 0.47 THOUSAND/ΜL (ref 0–0.61)
EOSINOPHIL NFR BLD AUTO: 5 % (ref 0–6)
ERYTHROCYTE [DISTWIDTH] IN BLOOD BY AUTOMATED COUNT: 20.4 % (ref 11.6–15.1)
EXT PREG TEST URINE: NEGATIVE
EXT. CONTROL ED NAV: NORMAL
GFR SERPL CREATININE-BSD FRML MDRD: 119 ML/MIN/1.73SQ M
GLUCOSE SERPL-MCNC: 116 MG/DL (ref 65–140)
GLUCOSE UR STRIP-MCNC: NEGATIVE MG/DL
HCT VFR BLD AUTO: 32.9 % (ref 34.8–46.1)
HGB BLD-MCNC: 8.6 G/DL (ref 11.5–15.4)
HGB UR QL STRIP.AUTO: NEGATIVE
IMM GRANULOCYTES # BLD AUTO: 0.04 THOUSAND/UL (ref 0–0.2)
IMM GRANULOCYTES NFR BLD AUTO: 0 % (ref 0–2)
KETONES UR STRIP-MCNC: NEGATIVE MG/DL
LEUKOCYTE ESTERASE UR QL STRIP: NEGATIVE
LIPASE SERPL-CCNC: 166 U/L (ref 73–393)
LYMPHOCYTES # BLD AUTO: 2.93 THOUSANDS/ΜL (ref 0.6–4.47)
LYMPHOCYTES NFR BLD AUTO: 30 % (ref 14–44)
MCH RBC QN AUTO: 15.6 PG (ref 26.8–34.3)
MCHC RBC AUTO-ENTMCNC: 26.1 G/DL (ref 31.4–37.4)
MCV RBC AUTO: 60 FL (ref 82–98)
MONOCYTES # BLD AUTO: 0.72 THOUSAND/ΜL (ref 0.17–1.22)
MONOCYTES NFR BLD AUTO: 7 % (ref 4–12)
NEUTROPHILS # BLD AUTO: 5.6 THOUSANDS/ΜL (ref 1.85–7.62)
NEUTS SEG NFR BLD AUTO: 57 % (ref 43–75)
NITRITE UR QL STRIP: NEGATIVE
NRBC BLD AUTO-RTO: 0 /100 WBCS
PH UR STRIP.AUTO: 7.5 [PH] (ref 4.5–8)
PLATELET # BLD AUTO: 371 THOUSANDS/UL (ref 149–390)
PMV BLD AUTO: 10 FL (ref 8.9–12.7)
POTASSIUM SERPL-SCNC: 3.8 MMOL/L (ref 3.5–5.3)
PROT SERPL-MCNC: 7.6 G/DL (ref 6.4–8.4)
PROT UR STRIP-MCNC: NEGATIVE MG/DL
RBC # BLD AUTO: 5.5 MILLION/UL (ref 3.81–5.12)
SODIUM SERPL-SCNC: 142 MMOL/L (ref 135–147)
SP GR UR STRIP.AUTO: 1.02 (ref 1–1.03)
UROBILINOGEN UR QL STRIP.AUTO: 0.2 E.U./DL
WBC # BLD AUTO: 9.81 THOUSAND/UL (ref 4.31–10.16)

## 2022-09-20 PROCEDURE — 99284 EMERGENCY DEPT VISIT MOD MDM: CPT

## 2022-09-20 PROCEDURE — 81003 URINALYSIS AUTO W/O SCOPE: CPT

## 2022-09-20 PROCEDURE — 36415 COLL VENOUS BLD VENIPUNCTURE: CPT | Performed by: EMERGENCY MEDICINE

## 2022-09-20 PROCEDURE — G1004 CDSM NDSC: HCPCS

## 2022-09-20 PROCEDURE — 99284 EMERGENCY DEPT VISIT MOD MDM: CPT | Performed by: EMERGENCY MEDICINE

## 2022-09-20 PROCEDURE — 85025 COMPLETE CBC W/AUTO DIFF WBC: CPT | Performed by: EMERGENCY MEDICINE

## 2022-09-20 PROCEDURE — 81025 URINE PREGNANCY TEST: CPT | Performed by: EMERGENCY MEDICINE

## 2022-09-20 PROCEDURE — 83690 ASSAY OF LIPASE: CPT | Performed by: EMERGENCY MEDICINE

## 2022-09-20 PROCEDURE — 74177 CT ABD & PELVIS W/CONTRAST: CPT

## 2022-09-20 PROCEDURE — 96374 THER/PROPH/DIAG INJ IV PUSH: CPT

## 2022-09-20 PROCEDURE — 80053 COMPREHEN METABOLIC PANEL: CPT | Performed by: EMERGENCY MEDICINE

## 2022-09-20 RX ORDER — KETOROLAC TROMETHAMINE 30 MG/ML
15 INJECTION, SOLUTION INTRAMUSCULAR; INTRAVENOUS ONCE
Status: COMPLETED | OUTPATIENT
Start: 2022-09-20 | End: 2022-09-20

## 2022-09-20 RX ORDER — NAPROXEN 500 MG/1
500 TABLET ORAL 2 TIMES DAILY WITH MEALS
Qty: 10 TABLET | Refills: 0 | Status: SHIPPED | OUTPATIENT
Start: 2022-09-20

## 2022-09-20 RX ADMIN — IOHEXOL 100 ML: 350 INJECTION, SOLUTION INTRAVENOUS at 22:05

## 2022-09-20 RX ADMIN — KETOROLAC TROMETHAMINE 15 MG: 30 INJECTION, SOLUTION INTRAMUSCULAR; INTRAVENOUS at 21:37

## 2022-09-21 NOTE — ED PROVIDER NOTES
History  Chief Complaint   Patient presents with    Abdominal Pain     Pt reports mid quad abd pain since Friday per pt       27 y o  F w/h/o right oophrectomy p/w right periumbilical abd pain x 4 days  Constant, "pushing," nonradiating  Worse with movement and eating  Very TTP superficially  Denies F/C, N/V/D/C, urinary complaints  History provided by:  Patient   used: No    Abdominal Pain  Pain location:  Periumbilical  Pain quality comment:  Pulling  Pain radiates to:  Does not radiate  Duration:  4 days  Timing:  Intermittent  Progression:  Unchanged  Chronicity:  New  Context: previous surgery    Worsened by: Movement (BM, movement)  Associated symptoms: no constipation, no diarrhea, no fever, no nausea and no vomiting        Prior to Admission Medications   Prescriptions Last Dose Informant Patient Reported?  Taking?   desogestrel-ethinyl estradiol (Apri) 0 15-30 MG-MCG per tablet   No No   Sig: Take 1 tablet by mouth daily      Facility-Administered Medications: None       Past Medical History:   Diagnosis Date    Endometriosis        Past Surgical History:   Procedure Laterality Date    CYSTOSCOPY N/A 12/7/2020    Procedure: CYSTOSCOPY;  Surgeon: Jordy Estrada DO;  [facility];  Service: Gynecology    OOPHORECTOMY Right 12/7/2020    Procedure: Daysi Reamer;  Surgeon: Jordy Estrada DO;  [facility];  Service: Gynecology    PELVIC LAPAROSCOPY Bilateral 12/7/2020    Procedure: CYSTECTOMY OVARIAN, LAPAROSCOPIC;  Surgeon: Jordy Estrada DO;  [facility];  Service: Gynecology    OR LAP,DIAGNOSTIC ABDOMEN N/A 12/7/2020    Procedure: LAPAROSCOPY DIAGNOSTIC;  Surgeon: Jordy Estrada DO;  [facility];  Service: Gynecology    OR LAP,RMV  ADNEXAL STRUCTURE Right 12/7/2020    Procedure: SALPINGECTOMY, LAPAROSCOPIC;  Surgeon: Jordy Estrada DO;  [facility];  Service: Gynecology       Family History Problem Relation Age of Onset    Cancer Mother 48        brain    Breast cancer Neg Hx     Colon cancer Neg Hx     Ovarian cancer Neg Hx      I have reviewed and agree with the history as documented  E-Cigarette/Vaping    E-Cigarette Use Never User      E-Cigarette/Vaping Substances     Social History     Tobacco Use    Smoking status: Never Smoker    Smokeless tobacco: Never Used   Vaping Use    Vaping Use: Never used   Substance Use Topics    Alcohol use: Yes     Comment: couple times/month    Drug use: Never       Review of Systems   Constitutional: Negative for fever  Gastrointestinal: Positive for abdominal pain  Negative for constipation, diarrhea, nausea and vomiting  All other systems reviewed and are negative  Physical Exam  Physical Exam  Vitals and nursing note reviewed  Constitutional:       General: She is not in acute distress  Appearance: Normal appearance  She is well-developed  She is not ill-appearing, toxic-appearing or diaphoretic  HENT:      Head: Normocephalic and atraumatic  Eyes:      General: No scleral icterus  Neck:      Vascular: No JVD  Trachea: Trachea normal    Cardiovascular:      Rate and Rhythm: Normal rate and regular rhythm  Heart sounds: Normal heart sounds  No murmur heard  No friction rub  Pulmonary:      Effort: Pulmonary effort is normal  No accessory muscle usage or respiratory distress  Breath sounds: Normal breath sounds  No stridor  No wheezing, rhonchi or rales  Abdominal:      General: There is no distension  Palpations: Abdomen is soft  Abdomen is not rigid  There is no mass  Tenderness: There is abdominal tenderness (Right periumbilical) in the periumbilical area  There is no guarding or rebound  Hernia: A hernia is present  Hernia is present in the umbilical area (Small)  Musculoskeletal:      Cervical back: Normal range of motion  Skin:     General: Skin is warm and dry        Coloration: Skin is not pale  Findings: No rash  Neurological:      Mental Status: She is alert  GCS: GCS eye subscore is 4  GCS verbal subscore is 5  GCS motor subscore is 6     Psychiatric:         Behavior: Behavior normal          Vital Signs  ED Triage Vitals [09/20/22 2027]   Temperature Pulse Respirations Blood Pressure SpO2   98 5 °F (36 9 °C) 92 20 (!) 152/101 100 %      Temp Source Heart Rate Source Patient Position - Orthostatic VS BP Location FiO2 (%)   Oral Monitor -- Right arm --      Pain Score       --           Vitals:    09/20/22 2027   BP: (!) 152/101   Pulse: 92         Visual Acuity      ED Medications  Medications   ketorolac (TORADOL) injection 15 mg (15 mg Intravenous Given 9/20/22 2137)   iohexol (OMNIPAQUE) 350 MG/ML injection (SINGLE-DOSE) 100 mL (100 mL Intravenous Given 9/20/22 2205)       Diagnostic Studies  Results Reviewed     Procedure Component Value Units Date/Time    Comprehensive metabolic panel [[id number]]  (Abnormal) Collected: 09/20/22 2132    Lab Status: Final result Specimen: Blood from Arm, Left Updated: 09/20/22 2157     Sodium 142 mmol/L      Potassium 3 8 mmol/L      Chloride 106 mmol/L      CO2 27 mmol/L      ANION GAP 9 mmol/L      BUN 11 mg/dL      Creatinine 0 62 mg/dL      Glucose 116 mg/dL      Calcium 8 6 mg/dL      Corrected Calcium 9 1 mg/dL      AST 12 U/L      ALT 19 U/L      Alkaline Phosphatase 44 U/L      Total Protein 7 6 g/dL      Albumin 3 4 g/dL      Total Bilirubin 0 27 mg/dL      eGFR 119 ml/min/1 73sq m     Narrative:      Michael guidelines for Chronic Kidney Disease (CKD):     Stage 1 with normal or high GFR (GFR > 90 mL/min/1 73 square meters)    Stage 2 Mild CKD (GFR = 60-89 mL/min/1 73 square meters)    Stage 3A Moderate CKD (GFR = 45-59 mL/min/1 73 square meters)    Stage 3B Moderate CKD (GFR = 30-44 mL/min/1 73 square meters)    Stage 4 Severe CKD (GFR = 15-29 mL/min/1 73 square meters)    Stage 5 End Stage CKD (GFR <15 mL/min/1 73 square meters)  Note: GFR calculation is accurate only with a steady state creatinine    Lipase [[id number]]  (Normal) Collected: 09/20/22 2132    Lab Status: Final result Specimen: Blood from Arm, Left Updated: 09/20/22 2157     Lipase 166 u/L     CBC and differential [[id number]]  (Abnormal) Collected: 09/20/22 2132    Lab Status: Final result Specimen: Blood from Arm, Left Updated: 09/20/22 2142     WBC 9 81 Thousand/uL      RBC 5 50 Million/uL      Hemoglobin 8 6 g/dL      Hematocrit 32 9 %      MCV 60 fL      MCH 15 6 pg      MCHC 26 1 g/dL      RDW 20 4 %      MPV 10 0 fL      Platelets 040 Thousands/uL      nRBC 0 /100 WBCs      Neutrophils Relative 57 %      Immat GRANS % 0 %      Lymphocytes Relative 30 %      Monocytes Relative 7 %      Eosinophils Relative 5 %      Basophils Relative 1 %      Neutrophils Absolute 5 60 Thousands/µL      Immature Grans Absolute 0 04 Thousand/uL      Lymphocytes Absolute 2 93 Thousands/µL      Monocytes Absolute 0 72 Thousand/µL      Eosinophils Absolute 0 47 Thousand/µL      Basophils Absolute 0 05 Thousands/µL     POCT pregnancy, urine [[id number]]  (Normal) Resulted: 09/20/22 2138    Lab Status: Final result Updated: 09/20/22 2138     EXT PREG TEST UR (Ref: Negative) negative     Control valid    Urine Macroscopic, POC [[id number]] Collected: 09/20/22 2134    Lab Status: Final result Specimen: Urine Updated: 09/20/22 2136     Color, UA Yellow     Clarity, UA Cloudy     pH, UA 7 5     Leukocytes, UA Negative     Nitrite, UA Negative     Protein, UA Negative mg/dl      Glucose, UA Negative mg/dl      Ketones, UA Negative mg/dl      Urobilinogen, UA 0 2 E U /dl      Bilirubin, UA Negative     Occult Blood, UA Negative     Specific Gravity, UA 1 020    Narrative:      CLINITEK RESULT                 CT abdomen pelvis with contrast   Final Result by Cady Badillo MD (09/20 2312)      There is a 2 cm cystic structure on the left ovary    There is infiltration of the mesenteric fat and there is a small amount of free fluid, most pronounced in the pelvis  Although these findings could be due to partial rupture of a an ovarian cyst,    endometriosis and infection are also included in the differential diagnosis  OB/GYN consultation and follow-up is recommended  Myomatous uterus  No evidence of bowel obstruction  Normal appendix  The study was marked in [facility] for immediate notification  Workstation performed: [id number]                    Procedures  Procedures         ED Course  ED Course as of 09/20/22 2346   Tue Sep 20, 2022   2142 Hemoglobin(!): 8 6  Baseline  8 3 on 2/24/21   2238 Pt reports pain is improved and is declining more pain meds  2320 Updated pt on CT results, including possible ruptured ovary vs endometriosis  Pt states she hasn't had any LLQ pain and has a h/o endometriosis and left ovarian cyst which she states she follows with OBGYN for  She will f/u with them for the findings  MDM    Disposition  Final diagnoses:   Periumbilical pain   Umbilical hernia   Left ovarian cyst   Abnormal CT scan     Time reflects when diagnosis was documented in both MDM as applicable and the Disposition within this note     Time User Action Codes Description Comment    9/20/2022 11:21 PM Susanne Billingsley 47 [C58 13] Periumbilical pain     2/23/9075 11:21 PM Susanne Billingsley 47 [K38 9] Umbilical hernia     9/58/2015 11:21 PM Susanne Billingsley 47 [N83 202] Left ovarian cyst     9/20/2022 11:46 PM Susanne Billingsley 47 [R93 89] Abnormal CT scan       ED Disposition     ED Disposition   Discharge    Condition   Stable    Date/Time   Tue Sep 20, 2022 11:22 PM    Comment   Lakesha Page discharge to home/self care                 Follow-up Information     Follow up With Specialties Details Why Contact Info    Your OBGYN  Schedule an appointment as soon as possible for a visit  For follow up of the cyst on your left ovary           Patient's Medications   Discharge Prescriptions    NAPROXEN (NAPROSYN) 500 MG TABLET    Take 1 tablet (500 mg total) by mouth 2 (two) times a day with meals       Start Date: 9/20/2022 End Date: --       Order Dose: 500 mg       Quantity: 10 tablet    Refills: 0       No discharge procedures on file      PDMP Review       Value Time User    PDMP Reviewed  Yes 12/5/2020 12:26 PM Angelina Greene MD          ED Provider  Electronically Signed by           Jose Francisco Aj, DO  09/20/22 4409

## 2023-01-30 ENCOUNTER — PREP FOR PROCEDURE (OUTPATIENT)
Dept: SURGERY | Facility: CLINIC | Age: 33
End: 2023-01-30

## 2023-01-30 ENCOUNTER — OFFICE VISIT (OUTPATIENT)
Dept: SURGERY | Facility: CLINIC | Age: 33
End: 2023-01-30

## 2023-01-30 VITALS
HEIGHT: 63 IN | HEART RATE: 109 BPM | DIASTOLIC BLOOD PRESSURE: 93 MMHG | WEIGHT: 202 LBS | BODY MASS INDEX: 35.79 KG/M2 | SYSTOLIC BLOOD PRESSURE: 139 MMHG

## 2023-01-30 DIAGNOSIS — K43.0 INCISIONAL HERNIA, INCARCERATED: Primary | ICD-10-CM

## 2023-01-30 DIAGNOSIS — K43.2 INCISIONAL HERNIA: Primary | ICD-10-CM

## 2023-01-30 NOTE — H&P (VIEW-ONLY)
Assessment/Plan: Patient presents with an incisional hernia at as well as above the umbilicus  It is mildly tender to palpation  The omentum appears to be incarcerated  This is been present for several years  She desires operative repair  I would concur  Risks and benefits explained in detail  There are no diagnoses linked to this encounter  Subjective:      Patient ID: Sonido Salazar is a 34 y o  female  Patient presents for umbilical hernia consult  States she has had a bulge at her umbilicus for years  The bulge has increased in size recently and she has achy pain  Limits her activities  The following portions of the patient's history were reviewed and updated as appropriate:     She  has a past medical history of Endometriosis  She  has a past surgical history that includes pr laps abd prtm&omentum dx w/wo spec br/wa spx (N/A, 12/7/2020); pr laparoscopy w/rmvl adnexal structures (Right, 12/7/2020); CYSTOSCOPY (N/A, 12/7/2020); Laparoscopy (Bilateral, 12/7/2020); and Oophorectomy (Right, 12/7/2020)  Her family history includes Cancer in her mother  She  reports that she has never smoked  She has never used smokeless tobacco  She reports current alcohol use  She reports that she does not use drugs  Current Outpatient Medications   Medication Sig Dispense Refill   • desogestrel-ethinyl estradiol (Apri) 0 15-30 MG-MCG per tablet Take 1 tablet by mouth daily 28 tablet 12   • naproxen (NAPROSYN) 500 mg tablet Take 1 tablet (500 mg total) by mouth 2 (two) times a day with meals (Patient not taking: Reported on 1/30/2023) 10 tablet 0     No current facility-administered medications for this visit  She has No Known Allergies       Review of Systems   Constitutional: Negative  Negative for activity change  HENT: Negative  Eyes: Negative  Respiratory: Negative  Cardiovascular: Negative  Gastrointestinal: Positive for abdominal pain  Endocrine: Negative      Genitourinary: Negative  Musculoskeletal: Negative  Skin: Negative  Allergic/Immunologic: Negative  Neurological: Negative  Psychiatric/Behavioral: Negative for agitation, behavioral problems and confusion  The patient is not nervous/anxious  All other systems reviewed and are negative  Objective:      /93   Pulse (!) 109   Ht 5' 3" (1 6 m)   Wt 91 6 kg (202 lb)   BMI 35 78 kg/m²          Physical Exam  Constitutional:       Appearance: Normal appearance  She is well-developed  HENT:      Head: Normocephalic and atraumatic  Nose: Nose normal    Eyes:      Extraocular Movements: Extraocular movements intact  Conjunctiva/sclera: Conjunctivae normal    Cardiovascular:      Rate and Rhythm: Normal rate and regular rhythm  Heart sounds: Normal heart sounds  Pulmonary:      Effort: Pulmonary effort is normal       Breath sounds: Normal breath sounds  Abdominal:      General: Abdomen is flat  Hernia: Hernia: ,Incisional hernia incarcerated incisional hernia at and above the umbilicus  This is not reducible  Musculoskeletal:      Right lower leg: No edema  Left lower leg: No edema  Skin:     General: Skin is warm and dry  Neurological:      Mental Status: She is alert and oriented to person, place, and time     Psychiatric:         Mood and Affect: Mood normal

## 2023-01-30 NOTE — PROGRESS NOTES
Assessment/Plan: Patient presents with an incisional hernia at as well as above the umbilicus  It is mildly tender to palpation  The omentum appears to be incarcerated  This is been present for several years  She desires operative repair  I would concur  Risks and benefits explained in detail  There are no diagnoses linked to this encounter  Subjective:      Patient ID: Tasia Suarez is a 34 y o  female  Patient presents for umbilical hernia consult  States she has had a bulge at her umbilicus for years  The bulge has increased in size recently and she has achy pain  Limits her activities  The following portions of the patient's history were reviewed and updated as appropriate:     She  has a past medical history of Endometriosis  She  has a past surgical history that includes pr laps abd prtm&omentum dx w/wo spec br/wa spx (N/A, 12/7/2020); pr laparoscopy w/rmvl adnexal structures (Right, 12/7/2020); CYSTOSCOPY (N/A, 12/7/2020); Laparoscopy (Bilateral, 12/7/2020); and Oophorectomy (Right, 12/7/2020)  Her family history includes Cancer in her mother  She  reports that she has never smoked  She has never used smokeless tobacco  She reports current alcohol use  She reports that she does not use drugs  Current Outpatient Medications   Medication Sig Dispense Refill   • desogestrel-ethinyl estradiol (Apri) 0 15-30 MG-MCG per tablet Take 1 tablet by mouth daily 28 tablet 12   • naproxen (NAPROSYN) 500 mg tablet Take 1 tablet (500 mg total) by mouth 2 (two) times a day with meals (Patient not taking: Reported on 1/30/2023) 10 tablet 0     No current facility-administered medications for this visit  She has No Known Allergies       Review of Systems   Constitutional: Negative  Negative for activity change  HENT: Negative  Eyes: Negative  Respiratory: Negative  Cardiovascular: Negative  Gastrointestinal: Positive for abdominal pain  Endocrine: Negative      Genitourinary: Negative  Musculoskeletal: Negative  Skin: Negative  Allergic/Immunologic: Negative  Neurological: Negative  Psychiatric/Behavioral: Negative for agitation, behavioral problems and confusion  The patient is not nervous/anxious  All other systems reviewed and are negative  Objective:      /93   Pulse (!) 109   Ht 5' 3" (1 6 m)   Wt 91 6 kg (202 lb)   BMI 35 78 kg/m²          Physical Exam  Constitutional:       Appearance: Normal appearance  She is well-developed  HENT:      Head: Normocephalic and atraumatic  Nose: Nose normal    Eyes:      Extraocular Movements: Extraocular movements intact  Conjunctiva/sclera: Conjunctivae normal    Cardiovascular:      Rate and Rhythm: Normal rate and regular rhythm  Heart sounds: Normal heart sounds  Pulmonary:      Effort: Pulmonary effort is normal       Breath sounds: Normal breath sounds  Abdominal:      General: Abdomen is flat  Hernia: Hernia: ,Incisional hernia incarcerated incisional hernia at and above the umbilicus  This is not reducible  Musculoskeletal:      Right lower leg: No edema  Left lower leg: No edema  Skin:     General: Skin is warm and dry  Neurological:      Mental Status: She is alert and oriented to person, place, and time     Psychiatric:         Mood and Affect: Mood normal

## 2023-02-06 NOTE — PRE-PROCEDURE INSTRUCTIONS
Pre-Surgery Instructions:   Medication Instructions   • desogestrel-ethinyl estradiol (Apri) 0 15-30 MG-MCG per tablet Take day of surgery  - Reviewed with patient, in detail, instructions from "My Surgical Experience"  - Instructed to avoid all OTC vitamins/supplements and NSAIDS starting today  Tylenol ok to take PRN  - Advised patient nothing eat or drink after midnight prior to surgery, except medications that he/she is to take morning DOS with only small sip of water     - Advised patient that Jorge Min will call with surgery arrival time and hospital directions the business day prior to surgery  Patient verbalized understanding of current visitor restrictions/masking guidelines and advised that he/she can confirm these at time of arrival call with Jorge Min      - Patient verbalized understanding and knows to call surgeon's office with any additional questions prior to surgery  Instructed to call surgeon's office in meantime with any new illnesses/exposure, patient verbalized understanding

## 2023-02-08 ENCOUNTER — HOSPITAL ENCOUNTER (OUTPATIENT)
Facility: HOSPITAL | Age: 33
Setting detail: OUTPATIENT SURGERY
Discharge: HOME | End: 2023-02-08
Attending: SURGERY | Admitting: SURGERY

## 2023-02-08 ENCOUNTER — ANESTHESIA EVENT (OUTPATIENT)
Dept: PERIOP | Facility: HOSPITAL | Age: 33
End: 2023-02-08

## 2023-02-08 ENCOUNTER — ANESTHESIA (OUTPATIENT)
Dept: PERIOP | Facility: HOSPITAL | Age: 33
End: 2023-02-08

## 2023-02-08 VITALS
BODY MASS INDEX: 36.14 KG/M2 | SYSTOLIC BLOOD PRESSURE: 132 MMHG | RESPIRATION RATE: 18 BRPM | HEART RATE: 93 BPM | OXYGEN SATURATION: 97 % | TEMPERATURE: 97.3 F | WEIGHT: 204 LBS | DIASTOLIC BLOOD PRESSURE: 75 MMHG | HEIGHT: 63 IN

## 2023-02-08 DIAGNOSIS — K43.0 INCISIONAL HERNIA, INCARCERATED: Primary | ICD-10-CM

## 2023-02-08 LAB
EXT PREGNANCY TEST URINE: NEGATIVE
EXT. CONTROL: NORMAL

## 2023-02-08 RX ORDER — ONDANSETRON 2 MG/ML
INJECTION INTRAMUSCULAR; INTRAVENOUS AS NEEDED
Status: DISCONTINUED | OUTPATIENT
Start: 2023-02-08 | End: 2023-02-08

## 2023-02-08 RX ORDER — ONDANSETRON 2 MG/ML
4 INJECTION INTRAMUSCULAR; INTRAVENOUS EVERY 4 HOURS PRN
Status: CANCELLED | OUTPATIENT
Start: 2023-02-08

## 2023-02-08 RX ORDER — HYDROMORPHONE HCL/PF 1 MG/ML
0.5 SYRINGE (ML) INJECTION
Status: DISCONTINUED | OUTPATIENT
Start: 2023-02-08 | End: 2023-02-08 | Stop reason: HOSPADM

## 2023-02-08 RX ORDER — FENTANYL CITRATE/PF 50 MCG/ML
25 SYRINGE (ML) INJECTION
Status: DISCONTINUED | OUTPATIENT
Start: 2023-02-08 | End: 2023-02-08 | Stop reason: HOSPADM

## 2023-02-08 RX ORDER — LIDOCAINE HYDROCHLORIDE 20 MG/ML
INJECTION, SOLUTION EPIDURAL; INFILTRATION; INTRACAUDAL; PERINEURAL AS NEEDED
Status: DISCONTINUED | OUTPATIENT
Start: 2023-02-08 | End: 2023-02-08

## 2023-02-08 RX ORDER — MIDAZOLAM HYDROCHLORIDE 2 MG/2ML
INJECTION, SOLUTION INTRAMUSCULAR; INTRAVENOUS AS NEEDED
Status: DISCONTINUED | OUTPATIENT
Start: 2023-02-08 | End: 2023-02-08

## 2023-02-08 RX ORDER — ACETAMINOPHEN 325 MG/1
650 TABLET ORAL EVERY 4 HOURS PRN
Status: CANCELLED | OUTPATIENT
Start: 2023-02-08

## 2023-02-08 RX ORDER — CEFAZOLIN SODIUM 1 G/3ML
INJECTION, POWDER, FOR SOLUTION INTRAMUSCULAR; INTRAVENOUS AS NEEDED
Status: DISCONTINUED | OUTPATIENT
Start: 2023-02-08 | End: 2023-02-08

## 2023-02-08 RX ORDER — GLYCOPYRROLATE 0.2 MG/ML
INJECTION INTRAMUSCULAR; INTRAVENOUS AS NEEDED
Status: DISCONTINUED | OUTPATIENT
Start: 2023-02-08 | End: 2023-02-08

## 2023-02-08 RX ORDER — CEFAZOLIN SODIUM 2 G/50ML
2000 SOLUTION INTRAVENOUS ONCE
Status: DISCONTINUED | OUTPATIENT
Start: 2023-02-08 | End: 2023-02-08 | Stop reason: HOSPADM

## 2023-02-08 RX ORDER — PROPOFOL 10 MG/ML
INJECTION, EMULSION INTRAVENOUS AS NEEDED
Status: DISCONTINUED | OUTPATIENT
Start: 2023-02-08 | End: 2023-02-08

## 2023-02-08 RX ORDER — HYDROMORPHONE HCL/PF 1 MG/ML
0.5 SYRINGE (ML) INJECTION
Status: CANCELLED | OUTPATIENT
Start: 2023-02-08

## 2023-02-08 RX ORDER — KETOROLAC TROMETHAMINE 30 MG/ML
INJECTION, SOLUTION INTRAMUSCULAR; INTRAVENOUS AS NEEDED
Status: DISCONTINUED | OUTPATIENT
Start: 2023-02-08 | End: 2023-02-08

## 2023-02-08 RX ORDER — SODIUM CHLORIDE, SODIUM LACTATE, POTASSIUM CHLORIDE, CALCIUM CHLORIDE 600; 310; 30; 20 MG/100ML; MG/100ML; MG/100ML; MG/100ML
INJECTION, SOLUTION INTRAVENOUS CONTINUOUS PRN
Status: DISCONTINUED | OUTPATIENT
Start: 2023-02-08 | End: 2023-02-08

## 2023-02-08 RX ORDER — OXYCODONE HYDROCHLORIDE AND ACETAMINOPHEN 5; 325 MG/1; MG/1
1 TABLET ORAL EVERY 4 HOURS PRN
Qty: 10 TABLET | Refills: 0 | Status: SHIPPED | OUTPATIENT
Start: 2023-02-08

## 2023-02-08 RX ORDER — HYDROMORPHONE HCL/PF 1 MG/ML
SYRINGE (ML) INJECTION AS NEEDED
Status: DISCONTINUED | OUTPATIENT
Start: 2023-02-08 | End: 2023-02-08

## 2023-02-08 RX ORDER — BUPIVACAINE HYDROCHLORIDE AND EPINEPHRINE 2.5; 5 MG/ML; UG/ML
INJECTION, SOLUTION EPIDURAL; INFILTRATION; INTRACAUDAL; PERINEURAL AS NEEDED
Status: DISCONTINUED | OUTPATIENT
Start: 2023-02-08 | End: 2023-02-08 | Stop reason: HOSPADM

## 2023-02-08 RX ORDER — FENTANYL CITRATE 50 UG/ML
INJECTION, SOLUTION INTRAMUSCULAR; INTRAVENOUS AS NEEDED
Status: DISCONTINUED | OUTPATIENT
Start: 2023-02-08 | End: 2023-02-08

## 2023-02-08 RX ORDER — DEXMEDETOMIDINE HYDROCHLORIDE 100 UG/ML
INJECTION, SOLUTION INTRAVENOUS AS NEEDED
Status: DISCONTINUED | OUTPATIENT
Start: 2023-02-08 | End: 2023-02-08

## 2023-02-08 RX ORDER — PROPOFOL 10 MG/ML
INJECTION, EMULSION INTRAVENOUS CONTINUOUS PRN
Status: DISCONTINUED | OUTPATIENT
Start: 2023-02-08 | End: 2023-02-08

## 2023-02-08 RX ORDER — DEXAMETHASONE SODIUM PHOSPHATE 10 MG/ML
INJECTION, SOLUTION INTRAMUSCULAR; INTRAVENOUS AS NEEDED
Status: DISCONTINUED | OUTPATIENT
Start: 2023-02-08 | End: 2023-02-08

## 2023-02-08 RX ORDER — OXYCODONE HYDROCHLORIDE AND ACETAMINOPHEN 5; 325 MG/1; MG/1
1 TABLET ORAL EVERY 4 HOURS PRN
Status: CANCELLED | OUTPATIENT
Start: 2023-02-08

## 2023-02-08 RX ADMIN — DEXMEDETOMIDINE HCL 12 MCG: 100 INJECTION INTRAVENOUS at 15:18

## 2023-02-08 RX ADMIN — FENTANYL CITRATE 25 MCG: 50 INJECTION, SOLUTION INTRAMUSCULAR; INTRAVENOUS at 15:12

## 2023-02-08 RX ADMIN — LIDOCAINE HYDROCHLORIDE 100 MG: 20 INJECTION, SOLUTION EPIDURAL; INFILTRATION; INTRACAUDAL; PERINEURAL at 15:05

## 2023-02-08 RX ADMIN — FENTANYL CITRATE 25 MCG: 50 INJECTION, SOLUTION INTRAMUSCULAR; INTRAVENOUS at 15:15

## 2023-02-08 RX ADMIN — PROPOFOL 50 MCG/KG/MIN: 10 INJECTION, EMULSION INTRAVENOUS at 15:09

## 2023-02-08 RX ADMIN — PROPOFOL 200 MG: 10 INJECTION, EMULSION INTRAVENOUS at 15:05

## 2023-02-08 RX ADMIN — HYDROMORPHONE HYDROCHLORIDE 0.25 MG: 1 INJECTION, SOLUTION INTRAMUSCULAR; INTRAVENOUS; SUBCUTANEOUS at 15:27

## 2023-02-08 RX ADMIN — ONDANSETRON 4 MG: 2 INJECTION INTRAMUSCULAR; INTRAVENOUS at 15:09

## 2023-02-08 RX ADMIN — SODIUM CHLORIDE, SODIUM LACTATE, POTASSIUM CHLORIDE, AND CALCIUM CHLORIDE: .6; .31; .03; .02 INJECTION, SOLUTION INTRAVENOUS at 15:50

## 2023-02-08 RX ADMIN — KETOROLAC TROMETHAMINE 15 MG: 30 INJECTION, SOLUTION INTRAMUSCULAR at 15:37

## 2023-02-08 RX ADMIN — DEXMEDETOMIDINE HCL 8 MCG: 100 INJECTION INTRAVENOUS at 15:22

## 2023-02-08 RX ADMIN — DEXAMETHASONE SODIUM PHOSPHATE 10 MG: 10 INJECTION, SOLUTION INTRAMUSCULAR; INTRAVENOUS at 15:09

## 2023-02-08 RX ADMIN — FENTANYL CITRATE 50 MCG: 50 INJECTION, SOLUTION INTRAMUSCULAR; INTRAVENOUS at 15:09

## 2023-02-08 RX ADMIN — SODIUM CHLORIDE, SODIUM LACTATE, POTASSIUM CHLORIDE, AND CALCIUM CHLORIDE: .6; .31; .03; .02 INJECTION, SOLUTION INTRAVENOUS at 14:56

## 2023-02-08 RX ADMIN — MIDAZOLAM 2 MG: 1 INJECTION INTRAMUSCULAR; INTRAVENOUS at 14:57

## 2023-02-08 RX ADMIN — CEFAZOLIN 2000 MG: 1 INJECTION, POWDER, FOR SOLUTION INTRAMUSCULAR; INTRAVENOUS at 15:12

## 2023-02-08 RX ADMIN — GLYCOPYRROLATE 0.1 MG: 0.2 INJECTION, SOLUTION INTRAMUSCULAR; INTRAVENOUS at 15:09

## 2023-02-08 RX ADMIN — HYDROMORPHONE HYDROCHLORIDE 0.25 MG: 1 INJECTION, SOLUTION INTRAMUSCULAR; INTRAVENOUS; SUBCUTANEOUS at 15:30

## 2023-02-08 NOTE — DISCHARGE INSTR - AVS FIRST PAGE
[facility] Discharge Instructions  Procedure: Incisional hernia repair (without mesh)  Surgeon: Dr Mary Pacheco MD    Please follow-up as scheduled  If you do not already have a follow-up appointment, please call our office when you leave to schedule follow-up appointment within 1-2 weeks  Activity:  - No lifting, pushing, or pulling anything over 15 pounds for 4-6 weeks or until cleared by your physician  - Regular activity (working around the house, walking up/down stairs, etc ) is ok and encouraged    Diet:    - You may resume your normal diet    Wound Care:  - You may shower in 24 hours  - When you shower, allow soapy water to run over your incisions and then pat dry  Do NOT rub or scrub your incisions  - Do not submerge your incisions in tubs, baths, pools, etc until cleared by your surgeon  - Do not apply any lotions, creams, or ointments to your incision until cleared by your surgeon    Medications:    - Continue your pre-hospital medication regimen after discharge unless your were instructed otherwise  Please refer to your discharge medication list for further details    - For the first few days following your procedure, take Tylenol to provide a solid baseline pain control  - If you are unable to have a bowel movement for more than 1-2 days, take an over the counter stool softener or laxative such as Milk of Magnesia, Colace, or Senna    Additional Instructions:  - If you have any questions or concerns after discharge please do not hesitate to contact our office, we would be happy to provide clarification      Call our office or return to the Emergency Room if you develop a fever greater than 101F, chills, persistent nausea/vomiting, worsening/uncontrollable pain, and/or increasing redness or purulent/foul smelling drainage from your incision(s)

## 2023-02-08 NOTE — INTERVAL H&P NOTE
H&P reviewed  After examining the patient I find no changes in the patients condition since the H&P had been written      Vitals:    02/08/23 1321   BP: 135/68   Pulse: 84   Resp: 16   Temp: 98 5 °F (36 9 °C)   SpO2: 97%

## 2023-02-08 NOTE — ANESTHESIA POSTPROCEDURE EVALUATION
Post-Op Assessment Note    CV Status:  Stable    Pain management: adequate     Mental Status:  Alert and awake   Hydration Status:  Euvolemic   PONV Controlled:  Controlled   Airway Patency:  Patent      Post Op Vitals Reviewed: Yes      Staff: CRNA, Anesthesiologist         No notable events documented      BP   92/58   Temp   97 9   Pulse  80   Resp   16   SpO2   99

## 2023-02-08 NOTE — OP NOTE
OPERATIVE REPORT  PATIENT NAME: Aramis Carrizales    :  1990  MRN: [id number]  Pt Location: [facility] OR ROOM 03    SURGERY DATE: 2023    Surgeon(s) and Role:     * Etelvina Fong MD - Primary     * Rain Holland MD - Assisting    Preop Diagnosis:  Incisional hernia [K43 2]    Post-Op Diagnosis Codes:     * Incisional hernia [K43 2]    Procedure(s):  REPAIR HERNIA INCISIONAL without mesh    Specimen(s):  * No specimens in log *    Estimated Blood Loss:   Minimal    Drains:  * No LDAs found *    Anesthesia Type:   General    Operative Indications:  Incisional hernia [K43 2]    Independent, non-smoker, ASA 1, wound class II, BMI 36, weight 204, height 63  Healthy      Complications:   None    Procedure and Technique:  Patient was identified visually and by armband  Placed in supine position  After anesthesia the abdomen was prepped and draped in sterile fashion  Quarter percent Marcaine with epinephrine was utilized throughout the procedure  Incision was made carried down through skin subtenons tissue  Hernia sac was dissected free and amputated at its base  Incisional hernia was less than 3 cm in size  Omentum was pexied inferiorly  The hernia was then closed primarily with interrupted figure-of-eight 0 Vicryl suture  Skin followed by 3-0 Vicryl subcutaneous and 4 Monocryl sutures  Dermabond was applied  The patient tolerated this procedure well  Sponge instrument count correct x2     I was present for the entire procedure    Patient Disposition:  PACU         SIGNATURE: Etelvina Fong MD  DATE: 2023  TIME: 4:14 PM

## 2023-02-08 NOTE — ANESTHESIA PREPROCEDURE EVALUATION
Procedure:  REPAIR HERNIA INCISIONAL (Abdomen)    Relevant Problems   No relevant active problems        Physical Exam    Airway    Mallampati score: I  TM Distance: >3 FB  Neck ROM: full     Dental       Cardiovascular      Pulmonary      Other Findings       Component Ref Range & Units 9/20/22 2132 2/22/21 0706 12/5/20 1306 11/4/20 1621 11/4/20 1621 10/4/20 1631 4/19/20 1202   Sodium 135 - 147 mmol/L 142  137 R  137 R   135 Low  R  137 R  136 R    Potassium 3 5 - 5 3 mmol/L 3 8  3 3 Low   3 2 Low    3 3 Low   3 6  3 6    Chloride 96 - 108 mmol/L 106  104 R  103 R   101 R  101 R  100 R    CO2 21 - 32 mmol/L 27  24  22   26  25  26    ANION GAP 4 - 13 mmol/L 9  9  12   8  11  10    BUN 5 - 25 mg/dL 11  5  8   6  7  7    Creatinine 0 60 - 1 30 mg/dL 0 62  0 57 Low  CM  0 65 CM   0 56 Low  CM  0 72 CM  0 76 CM    Comment: Standardized to IDMS reference method   Glucose 65 - 140 mg/dL 116  105 CM  87 CM   96 CM  107 CM  108 CM    Comment: If the patient is fasting, the ADA then defines impaired fasting glucose as > 100 mg/dL and diabetes as > or equal to 123 mg/dL  Specimen collection should occur prior to Sulfasalazine administration due to the potential for falsely depressed results  Specimen collection should occur prior to Sulfapyridine administration due to the potential for falsely elevated results  Calcium 8 3 - 10 1 mg/dL 8 6  8 4  8 7   8 4  8 6  8 7    Corrected Calcium 8 3 - 10 1 mg/dL 9 1          AST 5 - 45 U/L 12    10 CM   17 CM  14 CM    Comment: Specimen collection should occur prior to Sulfasalazine administration due to the potential for falsely depressed results  ALT 12 - 78 U/L 19    18 CM   22 CM  18 CM    Comment: Specimen collection should occur prior to Sulfasalazine administration due to the potential for falsely depressed results      Alkaline Phosphatase 46 - 116 U/L 44 Low     52   49  52    Total Protein 6 4 - 8 4 g/dL 7 6    8 1 R   8 2 R  8 1 R    Albumin 3 5 - 5 0 g/dL 3 4 Low  3 8   4 2  3 7    Total Bilirubin 0 20 - 1 00 mg/dL 0 27    0 85 CM   0 58 CM  1 07 High     Comment: Use of this assay is not recommended for patients undergoing treatment with eltrombopag due to the potential for falsely elevated results  eGFR ml/min/1 73sq m 119  124  119   125  113  106      Component Ref Range & Units 9/20/22 2132 2/24/21 0429 2/23/21 0728 2/22/21 0706 12/5/20 1306 11/6/20 0000 11/5/20 0405   WBC 4 31 - 10 16 Thousand/uL 9 81  8 50  10 02  9 09  10 66 High   11 92 High   13 21 High     RBC 3 81 - 5 12 Million/uL 5 50 High   4 13  4 72  4 93  5 15 High   4 39  4 95    Hemoglobin 11 5 - 15 4 g/dL 8 6 Low   8 3 Low   9 5 Low   9 9 Low   10 8 Low   8 3 Low   9 5 Low     Hematocrit 34 8 - 46 1 % 32 9 Low   28 8 Low   32 6 Low   34 6 Low   36 2  29 7 Low   33 3 Low     MCV 82 - 98 fL 60 Low   70 Low   69 Low   70 Low   70 Low   68 Low   67 Low     MCH 26 8 - 34 3 pg 15 6 Low   20 1 Low   20 1 Low   20 1 Low   21 0 Low   18 9 Low   19 2 Low     MCHC 31 4 - 37 4 g/dL 26 1 Low   28 8 Low   29 1 Low   28 6 Low   29 8 Low   27 9 Low   28 5 Low     RDW 11 6 - 15 1 % 20 4 High   15 4 High   15 5 High   15 9 High   22 8 High   16 9 High   16 9 High     MPV 8 9 - 12 7 fL 10 0  11 3  11 0  11 4  11 5  11 0  10 5    Platelets 756 - 917 Thousands/uL 371  284  316  390  268  253  251            Anesthesia Plan  ASA Score- 1     Anesthesia Type- general with ASA Monitors  Additional Monitors:   Airway Plan: LMA  Plan Factors-    Chart reviewed  EKG reviewed  Existing labs reviewed  Patient summary reviewed  Patient is not a current smoker  Patient did not smoke on day of surgery  Induction- intravenous  Postoperative Plan- Plan for postoperative opioid use  Planned trial extubation    Informed Consent- Anesthetic plan and risks discussed with patient  I personally reviewed this patient with the CRNA  Discussed and agreed on the Anesthesia Plan with the CRNA  Magy Govea

## 2023-02-21 ENCOUNTER — OFFICE VISIT (OUTPATIENT)
Dept: SURGERY | Facility: CLINIC | Age: 33
End: 2023-02-21

## 2023-02-21 DIAGNOSIS — K43.0 INCISIONAL HERNIA, INCARCERATED: Primary | ICD-10-CM

## 2023-02-21 NOTE — PROGRESS NOTES
Assessment/Plan:  Patient is status post incisional hernia repair  She feels well  She offers no complaints  Incisions clean healing well  No evidence for infection  Activity instructions provided  All questions answered  There are no diagnoses linked to this encounter  Subjective:      Patient ID: Natasha Arriaga is a 34 y o  female  Patient presents post operatively  Incisional hernia repair 2/8/2023  The following portions of the patient's history were reviewed and updated as appropriate:     She  has a past medical history of COVID-19 (11/2021) and Endometriosis  She  has a past surgical history that includes pr laps abd prtm&omentum dx w/wo spec br/wa spx (N/A, 12/7/2020); pr laparoscopy w/rmvl adnexal structures (Right, 12/7/2020); CYSTOSCOPY (N/A, 12/7/2020); Laparoscopy (Bilateral, 12/7/2020); Oophorectomy (Right, 12/7/2020); and pr rpr aa hernia 1st < 3 cm reducible (N/A, 2/8/2023)  Her family history includes Cancer in her mother  She  reports that she has never smoked  She has never used smokeless tobacco  She reports current alcohol use  She reports that she does not use drugs  Current Outpatient Medications   Medication Sig Dispense Refill   • desogestrel-ethinyl estradiol (Apri) 0 15-30 MG-MCG per tablet Take 1 tablet by mouth daily 28 tablet 12   • naproxen (NAPROSYN) 500 mg tablet Take 1 tablet (500 mg total) by mouth 2 (two) times a day with meals 10 tablet 0   • oxyCODONE-acetaminophen (PERCOCET) 5-325 mg per tablet Take 1 tablet by mouth every 4 (four) hours as needed for moderate pain for up to 10 doses Max Daily Amount: 6 tablets 10 tablet 0     No current facility-administered medications for this visit  She has No Known Allergies       Review of Systems   Constitutional: Negative  Negative for activity change  HENT: Negative  Eyes: Negative  Respiratory: Negative  Cardiovascular: Negative  Gastrointestinal: Positive for abdominal pain     Endocrine: Negative  Genitourinary: Negative  Musculoskeletal: Negative  Skin: Negative  Allergic/Immunologic: Negative  Neurological: Negative  Psychiatric/Behavioral: Negative for agitation, behavioral problems and confusion  The patient is not nervous/anxious  All other systems reviewed and are negative  Objective: There were no vitals taken for this visit  Physical Exam  Skin:     Comments: Incision is clean healing well  Abdomen is soft

## 2023-03-09 ENCOUNTER — HOSPITAL ENCOUNTER (EMERGENCY)
Facility: HOSPITAL | Age: 33
Discharge: HOME | End: 2023-03-09
Attending: EMERGENCY MEDICINE

## 2023-03-09 VITALS
SYSTOLIC BLOOD PRESSURE: 116 MMHG | WEIGHT: 198.41 LBS | RESPIRATION RATE: 18 BRPM | OXYGEN SATURATION: 99 % | HEART RATE: 88 BPM | TEMPERATURE: 98.1 F | BODY MASS INDEX: 35.15 KG/M2 | DIASTOLIC BLOOD PRESSURE: 58 MMHG

## 2023-03-09 DIAGNOSIS — A49.01 MSSA (METHICILLIN SUSCEPTIBLE STAPHYLOCOCCUS AUREUS): ICD-10-CM

## 2023-03-09 DIAGNOSIS — L30.9 DERMATITIS: ICD-10-CM

## 2023-03-09 DIAGNOSIS — K43.0 INCISIONAL HERNIA, INCARCERATED: ICD-10-CM

## 2023-03-09 DIAGNOSIS — T81.9XXA POST-OPERATIVE COMPLICATION: Primary | ICD-10-CM

## 2023-03-09 RX ORDER — DIAPER,BRIEF,INFANT-TODD,DISP
EACH MISCELLANEOUS
Qty: 15 G | Refills: 0 | Status: SHIPPED | OUTPATIENT
Start: 2023-03-09

## 2023-03-09 NOTE — DISCHARGE INSTRUCTIONS
Clean with soap and water gently daily - apply hydrocortisone cream to area 2x a day  FU with your surgeon

## 2023-03-09 NOTE — ED PROVIDER NOTES
History  Chief Complaint   Patient presents with   • Rash     Patient reports she had hernia surgery on 2/8 and reports last week she noticed a red itchy rash around umbilical area where surgery was  Reports clear drainage from wound  Denies fevers  Patient presents to the emergency department for a rash at her surgical site  On February 21 she had an umbilical hernia repair from Dr Marcelino Motta  She states everything was healing well she went for her postop appointment and he removed a bunch of glue and then afterwards she started with this itchy rash to the area  She states it just keeps getting worse and so she came in for evaluation  Patient did try Neosporin once but it seemed to make everything worse so she stopped using it  She is not having any abdominal pain she has been able to eat and drink normally and no diarrhea or constipation  She is not having any fevers  Prior to Admission Medications   Prescriptions Last Dose Informant Patient Reported?  Taking?   desogestrel-ethinyl estradiol (Apri) 0 15-30 MG-MCG per tablet   No Yes   Sig: Take 1 tablet by mouth daily      Facility-Administered Medications: None       Past Medical History:   Diagnosis Date   • COVID-19 11/2021    mild s/s-fully vaccinated   • Endometriosis        Past Surgical History:   Procedure Laterality Date   • CYSTOSCOPY N/A 12/7/2020    Procedure: CYSTOSCOPY;  Surgeon: Ritu Tristan DO;  [facility];  Service: Gynecology   • OOPHORECTOMY Right 12/7/2020    Procedure: Rebel Head;  Surgeon: Ritu Tristan DO;  [facility];  Service: Gynecology   • PELVIC LAPAROSCOPY Bilateral 12/7/2020    Procedure: CYSTECTOMY OVARIAN, LAPAROSCOPIC;  Surgeon: Ritu Tristan DO;  [facility];  Service: Gynecology   • MI LAPAROSCOPY W/RMVL ADNEXAL STRUCTURES Right 12/7/2020    Procedure: SALPINGECTOMY, LAPAROSCOPIC;  Surgeon: Ritu Tristan DO;  [facility];  Service: Gynecology   • DE LAPS ABD PRTM&OMENTUM DX W/WO SPEC BR/WA SPX N/A 12/7/2020    Procedure: LAPAROSCOPY DIAGNOSTIC;  Surgeon: Addy Barboza DO;  [facility];  Service: Gynecology   • DE RPR AA HERNIA 1ST < 3 CM REDUCIBLE N/A 2/8/2023    Procedure: REPAIR HERNIA INCISIONAL;  Surgeon: Jeremiah Saenz MD;  [facility];  Service: General       Family History   Problem Relation Age of Onset   • Cancer Mother         brain   • Breast cancer Neg Hx    • Colon cancer Neg Hx    • Ovarian cancer Neg Hx      I have reviewed and agree with the history as documented  E-Cigarette/Vaping   • E-Cigarette Use Never User      E-Cigarette/Vaping Substances   • Nicotine No    • THC No    • CBD No    • Flavoring No    • Other No    • Unknown No      Social History     Tobacco Use   • Smoking status: Never   • Smokeless tobacco: Never   Vaping Use   • Vaping Use: Never used   Substance Use Topics   • Alcohol use: Yes     Comment: couple times/month   • Drug use: No       Review of Systems   Respiratory: Negative  Cardiovascular: Negative  Gastrointestinal: Negative  Skin: Positive for rash  All other systems reviewed and are negative  Physical Exam  Physical Exam  Vitals and nursing note reviewed  Constitutional:       Appearance: She is well-developed  HENT:      Head: Normocephalic and atraumatic  Right Ear: External ear normal       Left Ear: External ear normal    Eyes:      Conjunctiva/sclera: Conjunctivae normal    Cardiovascular:      Rate and Rhythm: Normal rate and regular rhythm  Heart sounds: Normal heart sounds  Pulmonary:      Effort: Pulmonary effort is normal       Breath sounds: Normal breath sounds  Abdominal:      General: Bowel sounds are normal       Palpations: Abdomen is soft  Tenderness: There is no abdominal tenderness  There is no guarding or rebound  Musculoskeletal:         General: Normal range of motion  Cervical back: Neck supple  Lymphadenopathy:      Cervical: No cervical adenopathy  Skin:     General: Skin is warm  Findings: Rash present  Comments: Excoriated,  Crusting rash with oozing serous fluid around umbilicus - no cellulitic changes   Neurological:      Mental Status: She is alert  Psychiatric:         Behavior: Behavior normal              Vital Signs  ED Triage Vitals   Temperature Pulse Respirations Blood Pressure SpO2   03/09/23 1559 03/09/23 1559 03/09/23 1559 03/09/23 1602 03/09/23 1559   98 1 °F (36 7 °C) 84 20 (!) 144/109 99 %      Temp Source Heart Rate Source Patient Position - Orthostatic VS BP Location FiO2 (%)   03/09/23 1559 03/09/23 1559 03/09/23 1559 03/09/23 1559 --   Oral Monitor Sitting Right arm       Pain Score       --                  Vitals:    03/09/23 1559 03/09/23 1602 03/09/23 1805   BP:  (!) 144/109 116/58   Pulse: 84  88   Patient Position - Orthostatic VS: Sitting  Lying         Visual Acuity      ED Medications  Medications - No data to display    Diagnostic Studies  Results Reviewed     Procedure Component Value Units Date/Time    Wound culture and Gram stain [[id number]] Collected: 03/09/23 1730    Lab Status: In process Specimen: Wound from Abdominal Updated: 03/09/23 1736                 No orders to display              Procedures  Procedures         ED Course  ED Course as of 03/09/23 1840   Thu Mar 09, 2023   1625 Reached out to surgery - to discuss case   1726 DR Sebastián Hopson - will come see pt - with surgery   1810 Discussed with surgery - saw pt - feels this is reaction to the glue feels not infected - to clean daily    1821 To dc home with hydrocortisone 1% and FU in office                               SBIRT 20yo+    Flowsheet Row Most Recent Value   SBIRT (23 yo +)    In order to provide better care to our patients, we are screening all of our patients for alcohol and drug use  Would it be okay to ask you these screening questions?  Yes Filed at: 03/09/2023 1615   Initial Alcohol Screen: US AUDIT-C     1  How often do you have a drink containing alcohol? 0 Filed at: 03/09/2023 1615   2  How many drinks containing alcohol do you have on a typical day you are drinking? 0 Filed at: 03/09/2023 1615   3a  Male UNDER 65: How often do you have five or more drinks on one occasion? 0 Filed at: 03/09/2023 1615   3b  FEMALE Any Age, or MALE 65+: How often do you have 4 or more drinks on one occassion? 0 Filed at: 03/09/2023 1615   Audit-C Score 0 Filed at: 03/09/2023 1615   VILMA: How many times in the past year have you    Used an illegal drug or used a prescription medication for non-medical reasons? Never Filed at: 03/09/2023 1615                    Medical Decision Making  Surgery saw pt - feels its reaction from the glue - to use hydrocortisone and FU in office  Dermatitis: acute illness or injury  Post-operative complication: acute illness or injury  Amount and/or Complexity of Data Reviewed  Labs: ordered  Disposition  Final diagnoses:   Post-operative complication   Dermatitis     Time reflects when diagnosis was documented in both MDM as applicable and the Disposition within this note     Time User Action Codes Description Comment    3/9/2023  5:22 PM Maureen Linda Add [K43 0] Incisional hernia, incarcerated     3/9/2023  5:22 PM Maureen Linda Add [T81  9XXA] Post-operative complication     8/2/3728  6:22 PM Maureen Linda Add [L30 9] Dermatitis     3/9/2023  6:40 PM Maureen Linda Modify [Q15  9XXA] Post-operative complication     6/0/2799  6:40 PM Maureen Linda Remove [K43 0] Incisional hernia, incarcerated       ED Disposition     ED Disposition   Discharge    Condition   Stable    Date/Time   Thu Mar 9, 2023  6:21 PM    Comment   Dwayne Hampton discharge to home/self care                 Follow-up Information     Follow up With Specialties Details Why [address], MD [facility]   [address] [place] [address] Rd  [address] [facility] [address] MD General Surgery   [address]  [phone]            Discharge Medication List as of 3/9/2023  6:25 PM      START taking these medications    Details   hydrocortisone 1 % cream Apply to affected area 2 times daily, Normal         CONTINUE these medications which have NOT CHANGED    Details   desogestrel-ethinyl estradiol (Apri) 0 15-30 MG-MCG per tablet Take 1 tablet by mouth daily, Starting Mon 4/4/2022, Normal             No discharge procedures on file      PDMP Review       Value Time User    PDMP Reviewed  Yes 12/5/2020 12:26 PM Vicky Nugent MD          ED Provider  Electronically Signed by           Rosalinda Le PA-C  03/09/23 3702

## 2023-03-09 NOTE — CONSULTS
Consultation - General Surgery   Chantelle Godinez 34 y o  female MRN: [id number]  Unit/Bed#: ED-09 Encounter: [id number]      Assessment/Plan      Assessment:  35 y/o F w h/o umbilical hernia repair on 0/7/37 complicated by contact dermatitis of incision site  Vss  Afebrile  Umbilical incision well approximated, however contact dermatitis and scab in distribution of prior skin glue  No erythema or drainage  No purulence  No signs of infection  Plan:  No acute surgical intervention  Likely contact dermatitis from prior adhesive skin glue  Try OTC hydrocortisone cream prn  Keep dry  Wash with soap and water daily  Follow up in clinic with Dr Frank Hankins    History of Present Illness   Physician Requesting Consult: Jessee Aid*  Reason for Consult / Principal Problem: skin rash  History, ROS and PFSH unobtainable from any source due to none  HPI: Chantelle Godinez is a 34y o  year old female w PMH of umbilical hernia s/p umbilical hernia repair on 2/8 by Dr Frank Hankins who presents with scaling rash along prior glue site  She denied any fever, chills or night sweats  She denied cp or sob  No drainage  Denied pain  Only pruritis  Educated that this is likely contact dermatitis from skin glue  Denied fever, chills, chest pain, shortness of breath, nausea, vomiting, or abdominal pain this afternoon  Inpatient consult to Acute Care Surgery  Consult performed by: Adrienne Beverly MD  Consult ordered by: Deshawn Johnson PA-C          Review of Systems   Constitutional: Negative for chills and fever  HENT: Negative  Eyes: Negative  Respiratory: Negative  Cardiovascular: Negative  Gastrointestinal: Negative  Endocrine: Negative  Genitourinary: Negative  Musculoskeletal: Negative  Skin: Positive for rash  Allergic/Immunologic: Negative  Neurological: Negative  Hematological: Negative  Psychiatric/Behavioral: Negative          Historical Information   Past Medical History:   Diagnosis Date   • COVID-19 11/2021    mild s/s-fully vaccinated   • Endometriosis      Past Surgical History:   Procedure Laterality Date   • CYSTOSCOPY N/A 12/7/2020    Procedure: CYSTOSCOPY;  Surgeon: Boaz Ramsay DO;  [facility];  Service: Gynecology   • OOPHORECTOMY Right 12/7/2020    Procedure: Amrita Velez;  Surgeon: Boaz Ramsay DO;  [facility];  Service: Gynecology   • PELVIC LAPAROSCOPY Bilateral 12/7/2020    Procedure: CYSTECTOMY OVARIAN, LAPAROSCOPIC;  Surgeon: Boaz Ramsay DO;  [facility];  Service: Gynecology   • MI LAPAROSCOPY W/RMVL ADNEXAL STRUCTURES Right 12/7/2020    Procedure: SALPINGECTOMY, LAPAROSCOPIC;  Surgeon: Boaz Ramsay DO;  [facility];  Service: Gynecology   • MI LAPS ABD PRTM&OMENTUM DX W/WO [address]/ [facility] N/A 12/7/2020    Procedure: LAPAROSCOPY DIAGNOSTIC;  Surgeon: Boaz Ramsay DO;  [facility];  Service: Gynecology   • MI RPR AA HERNIA 1ST < 3 CM REDUCIBLE N/A 2/8/2023    Procedure: REPAIR HERNIA INCISIONAL;  Surgeon: Alecia Lagunas MD;  [facility];  Service: General     Social History   Social History     Substance and Sexual Activity   Alcohol Use Yes    Comment: couple times/month     Social History     Substance and Sexual Activity   Drug Use No     E-Cigarette/Vaping   • E-Cigarette Use Never User      E-Cigarette/Vaping Substances   • Nicotine No    • THC No    • CBD No    • Flavoring No    • Other No    • Unknown No      Social History     Tobacco Use   Smoking Status Never   Smokeless Tobacco Never     Family History: non-contributory}    Meds/Allergies   all current active meds have been reviewed  Allergies   Allergen Reactions   • Wound Dressing Adhesive Rash       Objective   Vitals: Blood pressure 116/58, pulse 88, temperature 98 1 °F (36 7 °C), temperature source Oral, resp   rate 18, weight 90 kg (198 lb 6 6 oz), last menstrual period 02/21/2023, SpO2 99 % ,Body mass index is 35 15 kg/m²  No intake or output data in the 24 hours ending 03/09/23 1818  Invasive Devices     None                 Physical Exam  Vitals reviewed  HENT:      Head: Normocephalic and atraumatic  Nose: Nose normal       Mouth/Throat:      Mouth: Mucous membranes are moist    Eyes:      Pupils: Pupils are equal, round, and reactive to light  Cardiovascular:      Pulses: Normal pulses  Pulmonary:      Effort: Pulmonary effort is normal    Abdominal:      General: There is no distension  Palpations: Abdomen is soft  Tenderness: There is no abdominal tenderness  There is no guarding  Genitourinary:     Comments: deferred  Musculoskeletal:         General: Normal range of motion  Cervical back: Normal range of motion and neck supple  Skin:     General: Skin is warm  Capillary Refill: Capillary refill takes 2 to 3 seconds  Neurological:      General: No focal deficit present  Mental Status: She is alert and oriented to person, place, and time  Psychiatric:         Mood and Affect: Mood normal          Lab Results: I have personally reviewed pertinent reports  , Coags: No results found for: PT, PTT, INR, Creatinine: No results found for: CREATININE, Lipid Panel: No results found for: CHOL, CBC with diff: No results found for: WBC, HGB, HCT, MCV, PLT, ADJUSTEDWBC, MCH, MCHC, RDW, MPV, NRBC, BMP/CMP: No results found for: SODIUM, K, CL, CO2, ANIONGAP, BUN, CREATININE, GLUCOSE, CALCIUM, AST, ALT, ALKPHOS, PROT, BILITOT, EGFR, Coags: No results found for: PT, PTT, INR, CRP: No results found for: CRP  Imaging Studies: I have personally reviewed pertinent reports  EKG, Pathology, and Other Studies: I have personally reviewed pertinent reports      VTE Prophylaxis: Sequential compression device Kiki Silveira      Code Status: Prior  Advance Directive and Living Will:      Power of :    POLST:      Counseling / Coordination of Care  Counseling/Coordination of Care: Total floor / unit time spent today 30 minutes  Greater than 50% of total time was spent with the patient and / or family counseling and / or coordination of care   A description of the counseling / coordination of care: 30

## 2023-03-12 LAB
BACTERIA WND AEROBE CULT: ABNORMAL
BACTERIA WND AEROBE CULT: ABNORMAL
GRAM STN SPEC: ABNORMAL
GRAM STN SPEC: ABNORMAL

## 2023-03-12 RX ORDER — SULFAMETHOXAZOLE AND TRIMETHOPRIM 800; 160 MG/1; MG/1
1 TABLET ORAL EVERY 12 HOURS SCHEDULED
Qty: 14 TABLET | Refills: 0 | Status: SHIPPED | OUTPATIENT
Start: 2023-03-12 | End: 2023-03-19

## 2023-03-12 NOTE — RESULT ENCOUNTER NOTE
3+ Growth of Staphylococcus aureus Abnormal    Spoke with pt regarding abnormal culture results  She denies allergies or pregnancy  Will fax Bactrim DS to pharmacy listed in EMR  Pt instructed to follow up with her physician  She verbalizes understanding of this

## 2023-03-17 ENCOUNTER — TELEPHONE (OUTPATIENT)
Dept: FAMILY MEDICINE CLINIC | Facility: CLINIC | Age: 33
End: 2023-03-17

## 2023-03-17 NOTE — TELEPHONE ENCOUNTER
----- Message from Ish Trivedi MD sent at 3/13/2023 10:08 AM EDT -----  Patient has not been seen in the office by any provider    I have never seen the patient  Please reach out to have patient establish as a new patient with any available provider

## 2023-03-27 ENCOUNTER — APPOINTMENT (EMERGENCY)
Dept: CT IMAGING | Facility: HOSPITAL | Age: 33
End: 2023-03-27

## 2023-03-27 ENCOUNTER — HOSPITAL ENCOUNTER (EMERGENCY)
Facility: HOSPITAL | Age: 33
Discharge: HOME | End: 2023-03-27
Attending: EMERGENCY MEDICINE

## 2023-03-27 VITALS
WEIGHT: 210.1 LBS | SYSTOLIC BLOOD PRESSURE: 117 MMHG | HEART RATE: 79 BPM | RESPIRATION RATE: 18 BRPM | TEMPERATURE: 98.3 F | OXYGEN SATURATION: 100 % | DIASTOLIC BLOOD PRESSURE: 63 MMHG | BODY MASS INDEX: 37.22 KG/M2

## 2023-03-27 DIAGNOSIS — D25.9 LEIOMYOMA OF UTERUS: ICD-10-CM

## 2023-03-27 DIAGNOSIS — D72.829 LEUKOCYTOSIS: ICD-10-CM

## 2023-03-27 DIAGNOSIS — R10.31 RLQ ABDOMINAL PAIN: Primary | ICD-10-CM

## 2023-03-27 DIAGNOSIS — N39.0 UTI (URINARY TRACT INFECTION): ICD-10-CM

## 2023-03-27 DIAGNOSIS — N28.9 RENAL LESION: ICD-10-CM

## 2023-03-27 LAB
ALBUMIN SERPL BCP-MCNC: 4.3 G/DL (ref 3.5–5)
ALP SERPL-CCNC: 45 U/L (ref 34–104)
ALT SERPL W P-5'-P-CCNC: 7 U/L (ref 7–52)
ANION GAP SERPL CALCULATED.3IONS-SCNC: 8 MMOL/L (ref 4–13)
AST SERPL W P-5'-P-CCNC: 10 U/L (ref 13–39)
BACTERIA UR QL AUTO: ABNORMAL /HPF
BASOPHILS # BLD AUTO: 0.09 THOUSANDS/ÂΜL (ref 0–0.1)
BASOPHILS NFR BLD AUTO: 1 % (ref 0–1)
BILIRUB SERPL-MCNC: 0.43 MG/DL (ref 0.2–1)
BILIRUB UR QL STRIP: NEGATIVE
BUN SERPL-MCNC: 8 MG/DL (ref 5–25)
CALCIUM SERPL-MCNC: 9.1 MG/DL (ref 8.4–10.2)
CHLORIDE SERPL-SCNC: 106 MMOL/L (ref 96–108)
CLARITY UR: ABNORMAL
CO2 SERPL-SCNC: 24 MMOL/L (ref 21–32)
COLOR UR: ABNORMAL
CREAT SERPL-MCNC: 0.62 MG/DL (ref 0.6–1.3)
EOSINOPHIL # BLD AUTO: 0.24 THOUSAND/ÂΜL (ref 0–0.61)
EOSINOPHIL NFR BLD AUTO: 2 % (ref 0–6)
ERYTHROCYTE [DISTWIDTH] IN BLOOD BY AUTOMATED COUNT: 19.6 % (ref 11.6–15.1)
EXT PREGNANCY TEST URINE: NEGATIVE
EXT. CONTROL: NORMAL
GFR SERPL CREATININE-BSD FRML MDRD: 118 ML/MIN/1.73SQ M
GLUCOSE SERPL-MCNC: 106 MG/DL (ref 65–140)
GLUCOSE UR STRIP-MCNC: NEGATIVE MG/DL
HCT VFR BLD AUTO: 33.5 % (ref 34.8–46.1)
HGB BLD-MCNC: 9.2 G/DL (ref 11.5–15.4)
HGB UR QL STRIP.AUTO: ABNORMAL
IMM GRANULOCYTES # BLD AUTO: 0.07 THOUSAND/UL (ref 0–0.2)
IMM GRANULOCYTES NFR BLD AUTO: 0 % (ref 0–2)
KETONES UR STRIP-MCNC: NEGATIVE MG/DL
LEUKOCYTE ESTERASE UR QL STRIP: NEGATIVE
LIPASE SERPL-CCNC: 21 U/L (ref 11–82)
LYMPHOCYTES # BLD AUTO: 2.14 THOUSANDS/ÂΜL (ref 0.6–4.47)
LYMPHOCYTES NFR BLD AUTO: 13 % (ref 14–44)
MCH RBC QN AUTO: 16.2 PG (ref 26.8–34.3)
MCHC RBC AUTO-ENTMCNC: 27.5 G/DL (ref 31.4–37.4)
MCV RBC AUTO: 59 FL (ref 82–98)
MONOCYTES # BLD AUTO: 0.6 THOUSAND/ÂΜL (ref 0.17–1.22)
MONOCYTES NFR BLD AUTO: 4 % (ref 4–12)
MUCOUS THREADS UR QL AUTO: ABNORMAL
NEUTROPHILS # BLD AUTO: 13.3 THOUSANDS/ÂΜL (ref 1.85–7.62)
NEUTS SEG NFR BLD AUTO: 80 % (ref 43–75)
NITRITE UR QL STRIP: NEGATIVE
NON-SQ EPI CELLS URNS QL MICRO: ABNORMAL /HPF
NRBC BLD AUTO-RTO: 0 /100 WBCS
PH UR STRIP.AUTO: 6.5 [PH] (ref 4.5–8)
PLATELET # BLD AUTO: 376 THOUSANDS/UL (ref 149–390)
PMV BLD AUTO: 9.5 FL (ref 8.9–12.7)
POTASSIUM SERPL-SCNC: 3.5 MMOL/L (ref 3.5–5.3)
PROT SERPL-MCNC: 7.7 G/DL (ref 6.4–8.4)
PROT UR STRIP-MCNC: ABNORMAL MG/DL
RBC # BLD AUTO: 5.67 MILLION/UL (ref 3.81–5.12)
RBC #/AREA URNS AUTO: ABNORMAL /HPF
SODIUM SERPL-SCNC: 138 MMOL/L (ref 135–147)
SP GR UR STRIP.AUTO: >=1.03 (ref 1–1.03)
UROBILINOGEN UR QL STRIP.AUTO: 0.2 E.U./DL
WBC # BLD AUTO: 16.44 THOUSAND/UL (ref 4.31–10.16)
WBC #/AREA URNS AUTO: ABNORMAL /HPF

## 2023-03-27 RX ORDER — IBUPROFEN 400 MG/1
400 TABLET ORAL ONCE
Status: COMPLETED | OUTPATIENT
Start: 2023-03-27 | End: 2023-03-27

## 2023-03-27 RX ORDER — NITROFURANTOIN 25; 75 MG/1; MG/1
100 CAPSULE ORAL 2 TIMES DAILY
Qty: 10 CAPSULE | Refills: 0 | Status: SHIPPED | OUTPATIENT
Start: 2023-03-27

## 2023-03-27 RX ADMIN — IBUPROFEN 400 MG: 400 TABLET, FILM COATED ORAL at 09:10

## 2023-03-27 RX ADMIN — SODIUM CHLORIDE 1000 ML: 0.9 INJECTION, SOLUTION INTRAVENOUS at 10:56

## 2023-03-27 RX ADMIN — MORPHINE SULFATE 2 MG: 2 INJECTION, SOLUTION INTRAMUSCULAR; INTRAVENOUS at 11:00

## 2023-03-27 RX ADMIN — IOHEXOL 100 ML: 350 INJECTION, SOLUTION INTRAVENOUS at 11:49

## 2023-03-27 NOTE — DISCHARGE INSTRUCTIONS
DISCHARGE INSTRUCTIONS:    FOLLOW UP WITH YOUR PRIMARY CARE PROVIDER OR THE [address] [place] [address]  MAKE AN APPOINTMENT TO BE SEEN  TAKE MEDICATION AS PRESCRIBED  IF RASH, SHORTNESS OF BREATH OR TROUBLE SWALLOWING, STOP TAKING THE MEDICATION AND BE SEEN  REST AND DRINK PLENTY OF FLUIDS  IF SYMPTOMS WORSEN OR NEW SYMPTOMS ARISE, RETURN TO THE ER TO BE SEEN

## 2023-03-27 NOTE — ED PROVIDER NOTES
History  Chief Complaint   Patient presents with   • Flank Pain     C/o right sided flank pain that radiates towards pelvis  Denies urinary complaints       32y  o female with PMH of endometriosis presents to the ER for RLQ pain for 2 days  Patient has been taking Tylenol for symptoms without relief  She describes her pain as sharp and at times radiating towards her right flank  Pain is constant  She denies fever, chills, URI symptoms, chest kenneth, dyspnea, N/V/D, urinary symptoms, weakness or paresthesias  History provided by:  Patient   used: No        Prior to Admission Medications   Prescriptions Last Dose Informant Patient Reported?  Taking?   desogestrel-ethinyl estradiol (Apri) 0 15-30 MG-MCG per tablet 3/26/2023  No Yes   Sig: Take 1 tablet by mouth daily   hydrocortisone 1 % cream Not Taking  No No   Sig: Apply to affected area 2 times daily   Patient not taking: Reported on 3/27/2023      Facility-Administered Medications: None       Past Medical History:   Diagnosis Date   • COVID-19 11/2021    mild s/s-fully vaccinated   • Endometriosis        Past Surgical History:   Procedure Laterality Date   • CYSTOSCOPY N/A 12/7/2020    Procedure: CYSTOSCOPY;  Surgeon: Nisa Mitchell DO;  Location[facility];  Service: Gynecology   • OOPHORECTOMY Right 12/7/2020    Procedure: Rivka Buff;  Surgeon: Nisa Mitchell DO;  [facility];  Service: Gynecology   • PELVIC LAPAROSCOPY Bilateral 12/7/2020    Procedure: CYSTECTOMY OVARIAN, LAPAROSCOPIC;  Surgeon: Nisa Mitchell DO;  [facility];  Service: Gynecology   • SC LAPAROSCOPY W/RMVL ADNEXAL STRUCTURES Right 12/7/2020    Procedure: SALPINGECTOMY, LAPAROSCOPIC;  Surgeon: Nisa Mitchell DO;  [facility];  Service: Gynecology   • SC LAPS ABD PRTM&OMENTUM DX W/WO SPEC BR/ [facility] N/A 12/7/2020    Procedure: LAPAROSCOPY DIAGNOSTIC;  Surgeon: Nisa Mitchell DO;  [facility];  Service: Gynecology   • ME RPR AA HERNIA 1ST < 3 CM REDUCIBLE N/A 2/8/2023    Procedure: REPAIR HERNIA INCISIONAL;  Surgeon: Lynette Beauchamp MD;  [facility];  Service: General       Family History   Problem Relation Age of Onset   • Cancer Mother         brain   • Breast cancer Neg Hx    • Colon cancer Neg Hx    • Ovarian cancer Neg Hx      I have reviewed and agree with the history as documented  E-Cigarette/Vaping   • E-Cigarette Use Never User      E-Cigarette/Vaping Substances   • Nicotine No    • THC No    • CBD No    • Flavoring No    • Other No    • Unknown No      Social History     Tobacco Use   • Smoking status: Never   • Smokeless tobacco: Never   Vaping Use   • Vaping Use: Never used   Substance Use Topics   • Alcohol use: Yes     Comment: couple times/month   • Drug use: No       Review of Systems   Constitutional: Negative for activity change, appetite change, chills and fever  HENT: Negative for congestion, drooling, ear discharge, ear pain, facial swelling, rhinorrhea and sore throat  Eyes: Negative for redness  Respiratory: Negative for cough and shortness of breath  Cardiovascular: Negative for chest pain  Gastrointestinal: Positive for abdominal pain  Negative for diarrhea, nausea and vomiting  Genitourinary: Positive for flank pain  Negative for dysuria, frequency, hematuria and urgency  Musculoskeletal: Negative for neck stiffness  Skin: Negative for rash  Allergic/Immunologic: Negative for food allergies  Neurological: Negative for weakness and numbness  Physical Exam  Physical Exam  Vitals and nursing note reviewed  Constitutional:       General: She is not in acute distress  Appearance: She is not toxic-appearing  HENT:      Head: Normocephalic and atraumatic  Eyes:      Conjunctiva/sclera: Conjunctivae normal    Neck:      Trachea: No tracheal deviation  Cardiovascular:      Rate and Rhythm: Normal rate and regular rhythm        Heart sounds: Normal heart sounds, S1 normal and S2 normal  No murmur heard  No friction rub  No gallop  Pulmonary:      Effort: Pulmonary effort is normal  No respiratory distress  Breath sounds: Normal breath sounds  No decreased breath sounds, wheezing, rhonchi or rales  Chest:      Chest wall: No tenderness  Abdominal:      General: Bowel sounds are normal  There is no distension  Palpations: Abdomen is soft  Tenderness: There is abdominal tenderness in the right lower quadrant and suprapubic area  There is no guarding or rebound  Musculoskeletal:      Cervical back: Normal range of motion and neck supple  Skin:     General: Skin is warm and dry  Findings: No rash  Neurological:      Mental Status: She is alert  GCS: GCS eye subscore is 4  GCS verbal subscore is 5  GCS motor subscore is 6     Psychiatric:         Mood and Affect: Mood normal          Vital Signs  ED Triage Vitals [03/27/23 0907]   Temperature Pulse Respirations Blood Pressure SpO2   98 3 °F (36 8 °C) 93 19 139/88 100 %      Temp Source Heart Rate Source Patient Position - Orthostatic VS BP Location FiO2 (%)   Oral Monitor Sitting Right arm --      Pain Score       9           Vitals:    03/27/23 0907 03/27/23 1109 03/27/23 1200   BP: 139/88 141/70 117/63   Pulse: 93 83 79   Patient Position - Orthostatic VS: Sitting Lying Sitting         Visual Acuity      ED Medications  Medications   ibuprofen (MOTRIN) tablet 400 mg (400 mg Oral Given 3/27/23 0910)   sodium chloride 0 9 % bolus 1,000 mL (0 mL Intravenous Stopped 3/27/23 1335)   morphine injection 2 mg (2 mg Intravenous Given 3/27/23 1100)   iohexol (OMNIPAQUE) 350 MG/ML injection (SINGLE-DOSE) 100 mL (100 mL Intravenous Given 3/27/23 1149)       Diagnostic Studies  Results Reviewed     Procedure Component Value Units Date/Time    Urine Microscopic [[id number]]  (Abnormal) Collected: 03/27/23 1041    Lab Status: Final result Specimen: Urine, Clean Catch Updated: 03/27/23 1120     RBC, UA 20-30 /hpf      WBC, UA 4-10 /hpf      Epithelial Cells Occasional /hpf      Bacteria, UA Moderate /hpf      MUCUS THREADS Innumerable    Comprehensive metabolic panel [[id number]]  (Abnormal) Collected: 03/27/23 1054    Lab Status: Final result Specimen: Blood from Arm, Left Updated: 03/27/23 1118     Sodium 138 mmol/L      Potassium 3 5 mmol/L      Chloride 106 mmol/L      CO2 24 mmol/L      ANION GAP 8 mmol/L      BUN 8 mg/dL      Creatinine 0 62 mg/dL      Glucose 106 mg/dL      Calcium 9 1 mg/dL      AST 10 U/L      ALT 7 U/L      Alkaline Phosphatase 45 U/L      Total Protein 7 7 g/dL      Albumin 4 3 g/dL      Total Bilirubin 0 43 mg/dL      eGFR 118 ml/min/1 73sq m     Narrative:      National Kid[employer] Disease Foundation guidelines for Chronic Kidney Disease (CKD):   •  Stage 1 with normal or high GFR (GFR > 90 mL/min/1 73 square meters)  •  Stage 2 Mild CKD (GFR = 60-89 mL/min/1 73 square meters)  •  Stage 3A Moderate CKD (GFR = 45-59 mL/min/1 73 square meters)  •  Stage 3B Moderate CKD (GFR = 30-44 mL/min/1 73 square meters)  •  Stage 4 Severe CKD (GFR = 15-29 mL/min/1 73 square meters)  •  Stage 5 End Stage CKD (GFR <15 mL/min/1 73 square meters)  Note: GFR calculation is accurate only with a steady state creatinine    Lipase [[id number]]  (Normal) Collected: 03/27/23 1054    Lab Status: Final result Specimen: Blood from Arm, Left Updated: 03/27/23 1118     Lipase 21 u/L     CBC and differential [[id number]]  (Abnormal) Collected: 03/27/23 1054    Lab Status: Final result Specimen: Blood from Arm, Left Updated: 03/27/23 1101     WBC 16 44 Thousand/uL      RBC 5 67 Million/uL      Hemoglobin 9 2 g/dL      Hematocrit 33 5 %      MCV 59 fL      MCH 16 2 pg      MCHC 27 5 g/dL      RDW 19 6 %      MPV 9 5 fL      Platelets 456 Thousands/uL      nRBC 0 /100 WBCs      Neutrophils Relative 80 %      Immat GRANS % 0 %      Lymphocytes Relative 13 %      Monocytes Relative 4 %      Eosinophils Relative 2 %      Basophils Relative 1 %      Neutrophils Absolute 13 30 Thousands/µL      Immature Grans Absolute 0 07 Thousand/uL      Lymphocytes Absolute 2 14 Thousands/µL      Monocytes Absolute 0 60 Thousand/µL      Eosinophils Absolute 0 24 Thousand/µL      Basophils Absolute 0 09 Thousands/µL     POCT pregnancy, urine [[id number]]  (Normal) Resulted: 03/27/23 1044    Lab Status: Final result Updated: 03/27/23 1045     EXT Preg Test, Ur Negative     Control Valid    Urine Macroscopic, POC [[id number]]  (Abnormal) Collected: 03/27/23 1041    Lab Status: Final result Specimen: Urine Updated: 03/27/23 1043     Color, UA Maria G     Clarity, UA Slightly Cloudy     pH, UA 6 5     Leukocytes, UA Negative     Nitrite, UA Negative     Protein, UA 30 (1+) mg/dl      Glucose, UA Negative mg/dl      Ketones, UA Negative mg/dl      Urobilinogen, UA 0 2 E U /dl      Bilirubin, UA Negative     Occult Blood, UA Large     Specific Gravity, UA >=1 030    Narrative:      CLINITEK RESULT                 CT abdomen pelvis with contrast   Final Result by Laurel Garcia MD (03/27 1310)      Trace free fluid in the pelvis within physiologic limits  No other evidence of acute abdominopelvic process  Normal appendix  Leiomyomatous uterus              Workstation performed: [id number]                    Procedures  Procedures         ED Course  ED Course as of 03/27/23 1556   Mon Mar 27, 2023   1049 Blood, UA(!): Large   1049 Leukocytes, UA: Negative   1049 Nitrite, UA: Negative   1049 PREGNANCY TEST URINE: Negative   1103 WBC(!): 16 44   1103 Hemoglobin(!): 9 2   1104 Platelet Count: 587   1141 Lipase: 21   1141 Comprehensive metabolic panel(!)  Normal other then AST of 10   1141 Bacteria, UA(!): Moderate   1141 Waiting for CT                               SBIRT 20yo+    Flowsheet Row Most Recent Value   SBIRT (25 yo +)    In order to provide better care to our patients, we are screening all of our patients for alcohol and drug use  Would it be okay to ask you these screening questions? Unable to answer at this time Filed at: 03/27/2023 3192                    Medical Decision Making  32y  o female presents to the ER for RLQ/ right flank pain for 2 days  Vitals are stable  Patient is in no acute distress  On exam, lungs are clear  Heart is regular rate and rhythm  Abdomen is soft but tender in the RLQ and suprapubic area  No guarding, rigidity, distention or pulsatile masses palpated  Will check labs and imaging  1049 Blood, UA(!): Large - patient reports just finishing her period  1049 Leukocytes, UA: Negative    1049 Nitrite, UA: Negative    1049 PREGNANCY TEST URINE: Negative    1103 WBC(!): 16 44    1103 Hemoglobin(!): 9 2 - higher then normal     1104 Platelet Count: 790    1141 Lipase: 21    1141 Comprehensive metabolic panel(!) - Normal other then AST of 10    1141 Bacteria, UA(!): Moderate - although patient denies urinary symptoms  Urine concerning for infection and would account for suprapubic abdominal pain  1141 Waiting for CT     1315     Informed patient of lab and imaging findings  Patient reports improvement in symptoms  Will discharge  Patient agreeable  RTER precautions given  The management plan was discussed in detail with the patient at bedside and all questions were answered  Prior to discharge, we provided both verbal and written instructions  We discussed with the patient the signs and symptoms for which to return to the emergency department  All questions were answered and patient was comfortable with the plan of care and discharged to home  Instructed the patient to follow up with the primary care provider and/or specialist provided and their written instructions  The patient verbalized understanding of our discussion and plan of care, and agrees to return to the Emergency Department for concerns and progression of illness      At discharge, I instructed the patient to:  -follow up with pcp  -take Macrobid as prescribed  -rest and drink plenty of fluids  -return to the ER if symptoms worsened or new symptoms arose  Patient agreed to this plan and was stable at time of discharge  Leiomyoma of uterus: chronic illness or injury  Leukocytosis: acute illness or injury  Renal lesion: chronic illness or injury  RLQ abdominal pain: acute illness or injury  UTI (urinary tract infection): acute illness or injury  Amount and/or Complexity of Data Reviewed  Independent Historian:      Details: Patient is historian  Labs: ordered  Decision-making details documented in ED Course  Radiology: ordered  Risk  Prescription drug management  Disposition  Final diagnoses:   RLQ abdominal pain   UTI (urinary tract infection)   Renal lesion   Leiomyoma of uterus   Leukocytosis     Time reflects when diagnosis was documented in both MDM as applicable and the Disposition within this note     Time User Action Codes Description Comment    3/27/2023  1:20 PM Nancey Sarika A Add [R10 31] RLQ abdominal pain     3/27/2023  1:20 PM Nancey Sarika A Add [N39 0] UTI (urinary tract infection)     3/27/2023  1:20 PM Nancey Asrika A Add [N28 9] Renal lesion     3/27/2023  1:20 PM Nancey Sarika A Add [D25 9] Leiomyoma of uterus     3/27/2023  1:20 PM Nancey Sarika A Add [V35 493] Leukocytosis       ED Disposition     ED Disposition   Discharge    Condition   Stable    Date/Time   Mon Mar 27, 2023  1:19 PM    Comment   Roxann Obrien discharge to home/self care                 Follow-up Information     Follow up With Specialties Details Why Contact Info Additional [address] [facility] Schedule an appointment as soon as possible for a visit  As needed [address], [address] [facility]  [id number]  [address], [address], [address] [place], [place], [address] Discharge Medication List as of 3/27/2023  1:27 PM      START taking these medications    Details   nitrofurantoin (MACROBID) 100 mg capsule Take 1 capsule (100 mg total) by mouth 2 (two) times a day, Starting Mon 3/27/2023, Normal         CONTINUE these medications which have NOT CHANGED    Details   desogestrel-ethinyl estradiol (Apri) 0 15-30 MG-MCG per tablet Take 1 tablet by mouth daily, Starting Mon 4/4/2022, Normal      hydrocortisone 1 % cream Apply to affected area 2 times daily, Normal             No discharge procedures on file      PDMP Review       Value Time User    PDMP Reviewed  Yes 12/5/2020 12:26 PM Rissa Abbasi MD          ED Provider  Electronically Signed by           Anjali Dickinson PA-C  03/27/23 9468

## 2023-03-29 DIAGNOSIS — N80.9 ENDOMETRIOSIS: ICD-10-CM

## 2023-03-29 RX ORDER — DESOGESTREL AND ETHINYL ESTRADIOL 0.15-0.03
KIT ORAL
Qty: 84 TABLET | Refills: 4 | Status: SHIPPED | OUTPATIENT
Start: 2023-03-29

## 2023-04-05 ENCOUNTER — HOSPITAL ENCOUNTER (EMERGENCY)
Facility: HOSPITAL | Age: 33
Discharge: HOME | End: 2023-04-05
Attending: EMERGENCY MEDICINE

## 2023-04-05 ENCOUNTER — APPOINTMENT (EMERGENCY)
Dept: RADIOLOGY | Facility: HOSPITAL | Age: 33
End: 2023-04-05

## 2023-04-05 VITALS
TEMPERATURE: 98.2 F | HEART RATE: 97 BPM | BODY MASS INDEX: 37.14 KG/M2 | WEIGHT: 209.66 LBS | RESPIRATION RATE: 18 BRPM | OXYGEN SATURATION: 99 %

## 2023-04-05 DIAGNOSIS — L03.90 CELLULITIS: ICD-10-CM

## 2023-04-05 DIAGNOSIS — M25.521 RIGHT ELBOW PAIN: Primary | ICD-10-CM

## 2023-04-05 PROBLEM — Z80.3 FAMILY HISTORY OF BREAST CANCER IN FIRST DEGREE RELATIVE: Status: ACTIVE | Noted: 2023-04-05

## 2023-04-05 LAB
BACTERIA UR QL AUTO: ABNORMAL /HPF
BILIRUB UR QL STRIP: NEGATIVE
CLARITY UR: CLEAR
COLOR UR: YELLOW
EXT PREGNANCY TEST URINE: NEGATIVE
EXT. CONTROL: NORMAL
GLUCOSE UR STRIP-MCNC: NEGATIVE MG/DL
HGB UR QL STRIP.AUTO: ABNORMAL
KETONES UR STRIP-MCNC: NEGATIVE MG/DL
LEUKOCYTE ESTERASE UR QL STRIP: ABNORMAL
MUCOUS THREADS UR QL AUTO: ABNORMAL
NITRITE UR QL STRIP: NEGATIVE
NON-SQ EPI CELLS URNS QL MICRO: ABNORMAL /HPF
OTHER STN SPEC: ABNORMAL
PH UR STRIP.AUTO: 6 [PH] (ref 4.5–8)
PROT UR STRIP-MCNC: ABNORMAL MG/DL
RBC #/AREA URNS AUTO: ABNORMAL /HPF
SP GR UR STRIP.AUTO: >=1.03 (ref 1–1.03)
UROBILINOGEN UR QL STRIP.AUTO: 0.2 E.U./DL
WBC #/AREA URNS AUTO: ABNORMAL /HPF

## 2023-04-05 RX ORDER — CEPHALEXIN 250 MG/1
500 CAPSULE ORAL ONCE
Status: COMPLETED | OUTPATIENT
Start: 2023-04-05 | End: 2023-04-05

## 2023-04-05 RX ORDER — CEPHALEXIN 500 MG/1
500 CAPSULE ORAL EVERY 8 HOURS SCHEDULED
Qty: 21 CAPSULE | Refills: 0 | Status: SHIPPED | OUTPATIENT
Start: 2023-04-05 | End: 2023-04-12

## 2023-04-05 RX ORDER — LACTOBACILLUS ACIDOPH-L.BULGARICUS 1 MILLION CELL CHEWABLE TABLET 1MM CELL
1 TABLET,CHEWABLE ORAL
Qty: 15 TABLET | Refills: 0 | Status: SHIPPED | OUTPATIENT
Start: 2023-04-05 | End: 2023-04-10

## 2023-04-05 RX ORDER — KETOROLAC TROMETHAMINE 30 MG/ML
30 INJECTION, SOLUTION INTRAMUSCULAR; INTRAVENOUS ONCE
Status: COMPLETED | OUTPATIENT
Start: 2023-04-05 | End: 2023-04-05

## 2023-04-05 RX ORDER — ACETAMINOPHEN 325 MG/1
650 TABLET ORAL ONCE
Status: COMPLETED | OUTPATIENT
Start: 2023-04-05 | End: 2023-04-05

## 2023-04-05 RX ORDER — NAPROXEN 500 MG/1
500 TABLET ORAL 2 TIMES DAILY WITH MEALS
Qty: 6 TABLET | Refills: 0 | Status: SHIPPED | OUTPATIENT
Start: 2023-04-05 | End: 2023-04-08

## 2023-04-05 RX ADMIN — ACETAMINOPHEN 650 MG: 325 TABLET ORAL at 18:12

## 2023-04-05 RX ADMIN — CEPHALEXIN 500 MG: 250 CAPSULE ORAL at 18:49

## 2023-04-05 RX ADMIN — DEXAMETHASONE SODIUM PHOSPHATE 10 MG: 10 INJECTION, SOLUTION INTRAMUSCULAR; INTRAVENOUS at 18:13

## 2023-04-05 RX ADMIN — KETOROLAC TROMETHAMINE 30 MG: 30 INJECTION, SOLUTION INTRAMUSCULAR; INTRAVENOUS at 18:14

## 2023-04-05 NOTE — ED PROVIDER NOTES
History  Chief Complaint   Patient presents with   • Arm Pain     Began 1 week ago  Pt localizes pain to R elbow  Patient is a 50-year-old female coming in today complaining of right elbow pain  She states that there is no trauma to this region  She states that about a weeks ago and progressively worsening  She reports that she went to see her family doctor and they told her it was from her exercises  She states that she is doing some Hysept hips and arm circles  He has no falls or injuries  She has no fevers, chills, chest pain shortness of breath  She denies any focal weakness of the bilateral upper extremities  She denies any focal paresthesias throughout the bilateral upper extremities  She states that is warm to touch and painful with movement  She states that she has some then similar several years ago to her left arm was told it was cellulitis  She is predominately right-handed      History provided by:  Patient   used: No    Elbow Pain  Location:  Elbow  Elbow location:  R elbow  Injury: no    Pain details:     Quality:  Aching, pressure, sharp and dull    Radiates to:  Does not radiate    Severity:  Moderate    Onset quality:  Gradual    Duration:  1 week    Timing:  Constant    Progression:  Unchanged  Handedness:  Right-handed  Dislocation: no    Foreign body present:  No foreign bodies  Tetanus status:  Up to date  Prior injury to area:  No  Relieved by:  Nothing  Worsened by:  Nothing  Ineffective treatments:  None tried  Associated symptoms: no back pain, no decreased range of motion, no fatigue, no fever, no muscle weakness, no neck pain, no numbness, no stiffness, no swelling and no tingling    Risk factors: no concern for non-accidental trauma, no known bone disorder, no frequent fractures and no recent illness        Prior to Admission Medications   Prescriptions Last Dose Informant Patient Reported? Taking?    Apri 0 15-30 MG-MCG per tablet   No Yes   Sig: TAKE 1 TABLET BY MOUTH EVERY DAY   hydrocortisone 1 % cream   No No   Sig: Apply to affected area 2 times daily   Patient not taking: Reported on 3/27/2023   nitrofurantoin (MACROBID) 100 mg capsule   No No   Sig: Take 1 capsule (100 mg total) by mouth 2 (two) times a day   Patient not taking: Reported on 4/5/2023      Facility-Administered Medications: None       Past Medical History:   Diagnosis Date   • COVID-19 11/2021    mild s/s-fully vaccinated   • Endometriosis        Past Surgical History:   Procedure Laterality Date   • CYSTOSCOPY N/A 12/7/2020    Procedure: Bronson ;  Surgeon: Sandra Wolf DO;  [facility];  Service: Gynecology   • OOPHORECTOMY Right 12/7/2020    Procedure: Rohan Palm;  Surgeon: Sandra Wolf DO;  [facility];  Service: Gynecology   • PELVIC LAPAROSCOPY Bilateral 12/7/2020    Procedure: CYSTECTOMY OVARIAN, LAPAROSCOPIC;  Surgeon: Sandra Wolf DO;  [facility];  Service: Gynecology   • ME LAPAROSCOPY W/RMVL ADNEXAL STRUCTURES Right 12/7/2020    Procedure: SALPINGECTOMY, LAPAROSCOPIC;  Surgeon: Sandra Wolf DO;  [facility];  Service: Gynecology   • ME LAPS ABD PRTM&OMENTUM DX W/WO [facility] N/A 12/7/2020    Procedure: LAPAROSCOPY DIAGNOSTIC;  Surgeon: Sandra Wolf DO;  [facility];  Service: Gynecology   • ME RPR AA HERNIA 1ST < 3 CM REDUCIBLE N/A 2/8/2023    Procedure: REPAIR HERNIA INCISIONAL;  Surgeon: Kavon Pimentel MD;  [facility];  Service: General       Family History   Problem Relation Age of Onset   • Cancer Mother         brain   • Breast cancer Neg Hx    • Colon cancer Neg Hx    • Ovarian cancer Neg Hx      I have reviewed and agree with the history as documented      E-Cigarette/Vaping   • E-Cigarette Use Never User      E-Cigarette/Vaping Substances   • Nicotine No    • THC No    • CBD No    • Flavoring No    • Other No    • Unknown No      Social History     Tobacco Use   • Smoking status: Never   • Smokeless tobacco: Never   Vaping Use   • Vaping Use: Never used   Substance Use Topics   • Alcohol use: Not Currently     Comment: couple times/month   • Drug use: No       Review of Systems   Constitutional: Negative  Negative for chills, fatigue and fever  HENT: Negative  Negative for ear pain and sore throat  Eyes: Negative for pain and visual disturbance  Respiratory: Negative  Negative for cough and shortness of breath  Cardiovascular: Negative  Negative for chest pain and palpitations  Gastrointestinal: Negative  Negative for abdominal pain and vomiting  Genitourinary: Negative  Negative for dysuria and hematuria  Musculoskeletal: Negative for arthralgias, back pain, neck pain and stiffness  Right elbow pain   Skin: Negative for color change and rash  Neurological: Negative  Negative for seizures and syncope  Hematological: Negative  Psychiatric/Behavioral: Negative  All other systems reviewed and are negative  Physical Exam  Physical Exam  Vitals and nursing note reviewed  Constitutional:       General: She is not in acute distress  Appearance: She is well-developed  HENT:      Head: Normocephalic and atraumatic  Comments: Patient maintaining airway and secretions  No stridor   No brawniness under tongue  Mouth/Throat:      Mouth: Mucous membranes are moist    Eyes:      Extraocular Movements: Extraocular movements intact  Pupils: Pupils are equal, round, and reactive to light  Pulmonary:      Effort: Pulmonary effort is normal  No respiratory distress  Musculoskeletal:         General: No swelling  Right shoulder: Normal       Left shoulder: Normal       Right elbow: No lacerations  Normal range of motion  No tenderness  Left elbow: Normal       Right forearm: Normal       Left forearm: Normal       Right wrist: Normal       Left wrist: Normal         Arms:       Cervical back: Neck supple  Comments: Patient has full active range of motion of the bilateral shoulders elbows and wrist while observing patient with other staff  Radial pulse 2+ equal and bilateral   Skin:     General: Skin is warm and dry  Capillary Refill: Capillary refill takes less than 2 seconds  Neurological:      General: No focal deficit present  Mental Status: She is alert and oriented to person, place, and time  GCS: GCS eye subscore is 4  GCS verbal subscore is 5  GCS motor subscore is 6  Cranial Nerves: Cranial nerves 2-12 are intact  Sensory: Sensation is intact  Motor: Motor function is intact  Coordination: Coordination is intact  Gait: Gait is intact  Comments:  speech  No facial asymmetry  No tongue deviation   Psychiatric:         Mood and Affect: Mood normal          Behavior: Behavior normal          Thought Content: Thought content normal          Judgment: Judgment normal          Vital Signs  ED Triage Vitals [04/05/23 1746]   Temperature Pulse Respirations BP SpO2   98 2 °F (36 8 °C) 97 18 -- 99 %      Temp Source Heart Rate Source Patient Position - Orthostatic VS BP Location FiO2 (%)   Oral -- Sitting Left arm --      Pain Score       8           Vitals:    04/05/23 1746   Pulse: 97   Patient Position - Orthostatic VS: Sitting         Visual Acuity      ED Medications  Medications   cephalexin (KEFLEX) capsule 500 mg (has no administration in time range)   ketorolac (TORADOL) injection 30 mg (30 mg Intramuscular Given 4/5/23 1814)   acetaminophen (TYLENOL) tablet 650 mg (650 mg Oral Given 4/5/23 1812)   dexamethasone oral liquid 10 mg 1 mL (10 mg Oral Given 4/5/23 1813)       Diagnostic Studies  Results Reviewed     Procedure Component Value Units Date/Time    Urine Microscopic [[id number]] Collected: 04/05/23 1807    Lab Status:  In process Specimen: Urine, Clean Catch Updated: 04/05/23 1813    POCT pregnancy, urine [[id number]]  (Normal) Resulted: 04/05/23 1810    Lab "Status: Final result Updated: 04/05/23 1810     EXT Preg Test, Ur Negative     Control Valid    Urine Macroscopic, POC [[id number]]  (Abnormal) Collected: 04/05/23 1807    Lab Status: Final result Specimen: Urine Updated: 04/05/23 1808     Color, UA Yellow     Clarity, UA Clear     pH, UA 6 0     Leukocytes, UA Trace     Nitrite, UA Negative     Protein, UA 30 (1+) mg/dl      Glucose, UA Negative mg/dl      Ketones, UA Negative mg/dl      Urobilinogen, UA 0 2 E U /dl      Bilirubin, UA Negative     Occult Blood, UA Trace     Specific Gravity, UA >=1 030    Narrative:      CLINITEK RESULT                 XR elbow 2 views RIGHT   ED Interpretation by William Laboy DO (04/05 1841)   Good alignment  No fracture  No dislocation                 Procedures  Procedures         ED Course  ED Course as of 04/05/23 1844 Wed Apr 05, 2023 1809 Patient is a a 34year old female coming in today with nontraumatic right elbow pain that started approximately 1 week ago  On exam he does have erythema and warmth to the medial aspect of the distal humerus as well as the proximal aspect of the medial aspect of forearm  There is no rashes or lesions  Is tender to palpation  Consistent more with a cellulitis however will obtain x-ray and CBC  Disclosure: Voice to text software was used in the preparation of this document and could have resulted in translational errors       Occasional wrong word or \"sound a like\" substitutions may have occurred due to the inherent limitations of voice recognition software   Read the chart carefully and recognize, using context, where substitutions have occurred        I have independently reviewed external records are available to me to the level of detail possible within the time constraints of my patient care responsibilities in the ED        1841 Patient's x-ray unremarkable  She is afebrile hemodynamically stable    Compartments are soft low concern for compartment syndrome or " thrombophlebitis  Low concern for DVT as patient does have tenderness warmth erythema consistent with cellulitis  Will give first dose of antibiotics and DC home with return to ER instructions  SBIRT 20yo+    Flowsheet Row Most Recent Value   SBIRT (25 yo +)    In order to provide better care to our patients, we are screening all of our patients for alcohol and drug use  Would it be okay to ask you these screening questions? Yes Filed at: 04/05/2023 1818   Initial Alcohol Screen: US AUDIT-C     1  How often do you have a drink containing alcohol? 0 Filed at: 04/05/2023 1818   2  How many drinks containing alcohol do you have on a typical day you are drinking? 0 Filed at: 04/05/2023 1818   3a  Male UNDER 65: How often do you have five or more drinks on one occasion? 0 Filed at: 04/05/2023 1818   3b  FEMALE Any Age, or MALE 65+: How often do you have 4 or more drinks on one occassion? 0 Filed at: 04/05/2023 1818   Audit-C Score 0 Filed at: 04/05/2023 1818   VILMA: How many times in the past year have you    Used an illegal drug or used a prescription medication for non-medical reasons? Never Filed at: 04/05/2023 1818                    Medical Decision Making  Amount and/or Complexity of Data Reviewed  Labs: ordered  Details: negative pregnancy  Radiology: ordered and independent interpretation performed  Decision-making details documented in ED Course  Details: no fracture  no dislocation      Risk  OTC drugs  Prescription drug management            Disposition  Final diagnoses:   Right elbow pain   Cellulitis     Time reflects when diagnosis was documented in both MDM as applicable and the Disposition within this note     Time User Action Codes Description Comment    4/5/2023  6:43 PM Alice Bound Add [M25 521] Right elbow pain     4/5/2023  6:43 PM Alexys Don Add [L03 90] Cellulitis       ED Disposition     ED Disposition   Discharge    Condition   Stable Date/Time   Wed Apr 5, 2023  6:43 PM    Comment   Roaxnn Obrien discharge to home/self care  Follow-up Information     Follow up With Specialties Details Why Contact Info Additional [facility] Medicine Schedule an appointment as soon as possible for a visit in 1 week  [address], [address] [facility] [phone]  [address] [facility] [address], [address], [address] [place], [place], [address]          Patient's Medications   Discharge Prescriptions    CEPHALEXIN (KEFLEX) 500 MG CAPSULE    Take 1 capsule (500 mg total) by mouth every 8 (eight) hours for 7 days       Start Date: 4/5/2023  End Date: 4/12/2023       Order Dose: 500 mg       Quantity: 21 capsule    Refills: 0    LACTOBACILLUS ACIDOPHILUS-BULGARICUS (LACTINEX) CHEWABLE TABLET    Chew 1 tablet 3 (three) times a day with meals for 5 days       Start Date: 4/5/2023  End Date: 4/10/2023       Order Dose: 1 tablet       Quantity: 15 tablet    Refills: 0    NAPROXEN (NAPROSYN) 500 MG TABLET    Take 1 tablet (500 mg total) by mouth 2 (two) times a day with meals for 3 days       Start Date: 4/5/2023  End Date: 4/8/2023       Order Dose: 500 mg       Quantity: 6 tablet    Refills: 0       No discharge procedures on file      PDMP Review       Value Time User    PDMP Reviewed  Yes 12/5/2020 12:26 PM Kathie Steele MD          ED Provider  Electronically Signed by           Suzie Torrez DO  04/05/23 8122

## 2023-04-17 ENCOUNTER — APPOINTMENT (OUTPATIENT)
Dept: LAB | Facility: MEDICAL CENTER | Age: 33
End: 2023-04-17
Attending: PHYSICIAN ASSISTANT

## 2023-04-17 ENCOUNTER — OCCMED (OUTPATIENT)
Dept: URGENT CARE | Facility: MEDICAL CENTER | Age: 33
End: 2023-04-17

## 2023-04-17 DIAGNOSIS — Z02.1 PRE-EMPLOYMENT DRUG SCREENING: ICD-10-CM

## 2023-04-17 DIAGNOSIS — Z02.1 PRE-EMPLOYMENT DRUG SCREENING: Primary | ICD-10-CM

## 2023-04-18 LAB
GAMMA INTERFERON BACKGROUND BLD IA-ACNC: 0.1 IU/ML
M TB IFN-G BLD-IMP: NEGATIVE
M TB IFN-G CD4+ BCKGRND COR BLD-ACNC: -0.03 IU/ML
M TB IFN-G CD4+ BCKGRND COR BLD-ACNC: -0.04 IU/ML
MITOGEN IGNF BCKGRD COR BLD-ACNC: >10 IU/ML

## 2023-08-25 ENCOUNTER — HOSPITAL ENCOUNTER (EMERGENCY)
Facility: HOSPITAL | Age: 33
Discharge: HOME | End: 2023-08-25
Attending: EMERGENCY MEDICINE | Admitting: EMERGENCY MEDICINE
Payer: MEDICARE

## 2023-08-25 VITALS
RESPIRATION RATE: 16 BRPM | OXYGEN SATURATION: 99 % | DIASTOLIC BLOOD PRESSURE: 85 MMHG | WEIGHT: 205.03 LBS | SYSTOLIC BLOOD PRESSURE: 137 MMHG | HEART RATE: 85 BPM | BODY MASS INDEX: 36.32 KG/M2 | TEMPERATURE: 97.8 F

## 2023-08-25 DIAGNOSIS — R03.0 ELEVATED BLOOD PRESSURE READING: ICD-10-CM

## 2023-08-25 DIAGNOSIS — S76.212A INGUINAL STRAIN, LEFT, INITIAL ENCOUNTER: Primary | ICD-10-CM

## 2023-08-25 LAB
BACTERIA UR QL AUTO: NORMAL /HPF
BILIRUB UR QL STRIP: NEGATIVE
CLARITY UR: CLEAR
COLOR UR: YELLOW
EXT PREGNANCY TEST URINE: NEGATIVE
EXT. CONTROL: NORMAL
GLUCOSE UR STRIP-MCNC: NEGATIVE MG/DL
HGB UR QL STRIP.AUTO: ABNORMAL
KETONES UR STRIP-MCNC: NEGATIVE MG/DL
LEUKOCYTE ESTERASE UR QL STRIP: ABNORMAL
NITRITE UR QL STRIP: NEGATIVE
NON-SQ EPI CELLS URNS QL MICRO: NORMAL /HPF
PH UR STRIP.AUTO: 6 [PH] (ref 4.5–8)
PROT UR STRIP-MCNC: NEGATIVE MG/DL
RBC #/AREA URNS AUTO: NORMAL /HPF
SP GR UR STRIP.AUTO: 1.01 (ref 1–1.03)
UROBILINOGEN UR QL STRIP.AUTO: 0.2 E.U./DL
WBC #/AREA URNS AUTO: NORMAL /HPF

## 2023-08-25 PROCEDURE — 96372 THER/PROPH/DIAG INJ SC/IM: CPT

## 2023-08-25 PROCEDURE — 81001 URINALYSIS AUTO W/SCOPE: CPT

## 2023-08-25 PROCEDURE — 81025 URINE PREGNANCY TEST: CPT | Performed by: EMERGENCY MEDICINE

## 2023-08-25 PROCEDURE — 99283 EMERGENCY DEPT VISIT LOW MDM: CPT

## 2023-08-25 RX ORDER — KETOROLAC TROMETHAMINE 30 MG/ML
15 INJECTION, SOLUTION INTRAMUSCULAR; INTRAVENOUS ONCE
Status: COMPLETED | OUTPATIENT
Start: 2023-08-25 | End: 2023-08-25

## 2023-08-25 RX ORDER — NAPROXEN 250 MG/1
250 TABLET ORAL
Qty: 21 TABLET | Refills: 0 | Status: SHIPPED | OUTPATIENT
Start: 2023-08-25 | End: 2023-09-01

## 2023-08-25 RX ORDER — LIDOCAINE 40 MG/G
CREAM TOPICAL AS NEEDED
Qty: 30 G | Refills: 0 | Status: SHIPPED | OUTPATIENT
Start: 2023-08-25

## 2023-08-25 RX ADMIN — KETOROLAC TROMETHAMINE 15 MG: 30 INJECTION, SOLUTION INTRAMUSCULAR; INTRAVENOUS at 18:25

## 2023-08-25 NOTE — Clinical Note
Kiran Resendiz was seen and treated in our emergency department on 8/25/2023. No restrictions            Diagnosis:     Nicolette Alford  may return to work on return date. She may return on this date: 08/27/2023         If you have any questions or concerns, please don't hesitate to call.       Josefina Vance    ______________________________           _______________          _______________  [facility] [place]                              Date                                Time

## 2023-08-25 NOTE — ED PROVIDER NOTES
History  Chief Complaint   Patient presents with   • Groin Pain     Pt reports left sided groin pain - pt denies injury and reports hurts to the touch      69-year-old female presents for evaluation of left inguinal pain that started suddenly earlier today. Patient states that she was walking when she had sudden onset of sharp pain in her left inguinal crease that feels similar to her prior hernia she had in her abdomen. The pain is constant, worse with touching or attempting to ambulate or bear weight. It is nonradiating. No urinary complaints, vaginal complaints, diarrhea, constipation, anorexia, nausea, vomit, fevers, chills, recent falls or trauma. History provided by:  Patient  Groin Pain  Associated symptoms: no abdominal pain, no chest pain, no congestion, no diarrhea, no ear pain, no fatigue, no fever, no myalgias, no nausea, no rash, no rhinorrhea, no shortness of breath, no sore throat, no vomiting and no wheezing        Prior to Admission Medications   Prescriptions Last Dose Informant Patient Reported? Taking?    Apri 0.15-30 MG-MCG per tablet   No No   Sig: TAKE 1 TABLET BY MOUTH EVERY DAY   hydrocortisone 1 % cream   No No   Sig: Apply to affected area 2 times daily   Patient not taking: Reported on 3/27/2023   naproxen (Naprosyn) 500 mg tablet   No No   Sig: Take 1 tablet (500 mg total) by mouth 2 (two) times a day with meals for 3 days   nitrofurantoin (MACROBID) 100 mg capsule   No No   Sig: Take 1 capsule (100 mg total) by mouth 2 (two) times a day   Patient not taking: Reported on 4/5/2023      Facility-Administered Medications: None       Past Medical History:   Diagnosis Date   • COVID-19 11/2021    mild s/s-fully vaccinated   • Endometriosis        Past Surgical History:   Procedure Laterality Date   • CYSTOSCOPY N/A 12/7/2020    Procedure: Alison Mortimer;  Surgeon: Aleksandr Velarde DO;  [facility];  Service: Gynecology   • OOPHORECTOMY Right 12/7/2020    Procedure: OOPHORECTOMY, LAPAROSCOPIC;  Surgeon: Saida Thacker DO;  [facility];  Service: Gynecology   • PELVIC LAPAROSCOPY Bilateral 12/7/2020    Procedure: CYSTECTOMY OVARIAN, LAPAROSCOPIC;  Surgeon: Saida Thacker DO;  [facility];  Service: Gynecology   • OH LAPAROSCOPY W/RMVL ADNEXAL STRUCTURES Right 12/7/2020    Procedure: SALPINGECTOMY, LAPAROSCOPIC;  Surgeon: Saida Thacker DO;  [facility];  Service: Gynecology   • OH LAPS ABD PRTM&OMENTUM DX W/WO SPEC BR/WA SPX N/A 12/7/2020    Procedure: LAPAROSCOPY DIAGNOSTIC;  Surgeon: Saida Thacker DO;  [facility];  Service: Gynecology   • OH RPR AA HERNIA 1ST < 3 CM REDUCIBLE N/A 2/8/2023    Procedure: REPAIR HERNIA INCISIONAL;  Surgeon: Jami Frye MD;  [facility];  Service: General       Family History   Problem Relation Age of Onset   • Cancer Mother         brain   • Breast cancer Neg Hx    • Colon cancer Neg Hx    • Ovarian cancer Neg Hx      I have reviewed and agree with the history as documented. E-Cigarette/Vaping   • E-Cigarette Use Never User      E-Cigarette/Vaping Substances   • Nicotine No    • THC No    • CBD No    • Flavoring No    • Other No    • Unknown No      Social History     Tobacco Use   • Smoking status: Never   • Smokeless tobacco: Never   Vaping Use   • Vaping Use: Never used   Substance Use Topics   • Alcohol use: Not Currently     Comment: couple times/month   • Drug use: No       Review of Systems   Constitutional: Negative for activity change, appetite change, fatigue and fever. HENT: Negative for congestion, dental problem, ear pain, rhinorrhea and sore throat. Eyes: Negative for pain and redness. Respiratory: Negative for chest tightness, shortness of breath and wheezing. Cardiovascular: Negative for chest pain and palpitations. Gastrointestinal: Negative for abdominal pain, blood in stool, constipation, diarrhea, nausea and vomiting.    Endocrine: Negative for cold intolerance and heat intolerance. Genitourinary: Positive for pelvic pain. Negative for decreased urine volume, difficulty urinating, dyspareunia, dysuria, flank pain, frequency, hematuria, vaginal bleeding, vaginal discharge and vaginal pain. Musculoskeletal: Negative for arthralgias and myalgias. Skin: Negative for color change, pallor and rash. Neurological: Negative for weakness and numbness. Hematological: Does not bruise/bleed easily. Psychiatric/Behavioral: Negative for agitation, hallucinations and suicidal ideas. Physical Exam  Physical Exam  Constitutional:       Appearance: She is well-developed. HENT:      Head: Normocephalic and atraumatic. Eyes:      Pupils: Pupils are equal, round, and reactive to light. Neck:      Vascular: No JVD. Trachea: No tracheal deviation. Cardiovascular:      Rate and Rhythm: Normal rate and regular rhythm. Pulmonary:      Effort: No tachypnea, accessory muscle usage or respiratory distress. Abdominal:      General: There is no distension. Palpations: There is no mass. Tenderness: There is no abdominal tenderness. There is no right CVA tenderness or left CVA tenderness. Hernia: No hernia is present. Comments: Female nurse wendy present as chaperone. Pelvis stable, nttp, FROM w/o pain. Pt has no swelling, redness, warmth of inguinal crease. No mass or lymphaendopathy. No skin changes. ttp over inguinal crease. Musculoskeletal:      Right lower leg: Normal.      Left lower leg: Normal.   Skin:     General: Skin is warm. Capillary Refill: Capillary refill takes less than 2 seconds. Neurological:      Mental Status: She is alert and oriented to person, place, and time.    Psychiatric:         Behavior: Behavior normal.         Vital Signs  ED Triage Vitals   Temperature Pulse Respirations Blood Pressure SpO2   08/25/23 1653 08/25/23 1654 08/25/23 1654 08/25/23 1654 08/25/23 1654   97.8 °F (36.6 °C) 100 18 (!) 194/86 100 %      Temp Source Heart Rate Source Patient Position - Orthostatic VS BP Location FiO2 (%)   08/25/23 1653 -- 08/25/23 1654 08/25/23 1654 --   Oral  Sitting Right arm       Pain Score       --                  Vitals:    08/25/23 1654   BP: (!) 194/86   Pulse: 100   Patient Position - Orthostatic VS: Sitting         Visual Acuity      ED Medications  Medications   ketorolac (TORADOL) injection 15 mg (has no administration in time range)       Diagnostic Studies  Results Reviewed     Procedure Component Value Units Date/Time    POCT pregnancy, urine [[id number]]  (Normal) Resulted: 08/25/23 1823    Lab Status: Final result Updated: 08/25/23 1823     EXT Preg Test, Ur Negative     Control Valid    Urine Microscopic [[id number]] Collected: 08/25/23 1815    Lab Status: In process Specimen: Urine, Clean Catch Updated: 08/25/23 1821    Urine Macroscopic, POC [[id number]]  (Abnormal) Collected: 08/25/23 1815    Lab Status: Final result Specimen: Urine Updated: 08/25/23 1817     Color, UA Yellow     Clarity, UA Clear     pH, UA 6.0     Leukocytes, UA Trace     Nitrite, UA Negative     Protein, UA Negative mg/dl      Glucose, UA Negative mg/dl      Ketones, UA Negative mg/dl      Urobilinogen, UA 0.2 E.U./dl      Bilirubin, UA Negative     Occult Blood, UA Trace     Specific Gravity, UA 1.015    Narrative:      CLINITEK RESULT                 No orders to display              Procedures  Procedures         ED Course                                             Medical Decision Making  Left inguinal pain likely secondary to inguinal strain, no history exam to suggest fracture, infectious etiology, obstructive pathology such as hernia and imaging or medical work-up is not indicated. We will do urine dip to rule out urinary tract infection, pregnancy, treat symptoms.     Asymptomatic hypertension-we will reassure, counseled outpatient follow-up        Disposition  Final diagnoses:   Inguinal strain, left, initial encounter   Elevated blood pressure reading     Time reflects when diagnosis was documented in both MDM as applicable and the Disposition within this note     Time User Action Codes Description Comment    8/25/2023  6:23 PM Tisha Pennant Add [D72.005N] Inguinal strain, left, initial encounter     8/25/2023  6:23 PM Tisha Pennant Add [R03.0] Elevated blood pressure reading       ED Disposition     ED Disposition   Discharge    Condition   Stable    Date/Time   Fri Aug 25, 2023  6:23 PM    Comment   Rafa Matter discharge to home/self care. Follow-up Information     Follow up With Specialties Details Why Contact Info Additional [facility] Medicine Schedule an appointment as soon as possible for a visit in 2 days  [address] [place] [phone]  [address] [place] [address], [address] [place] [address], [address] [place] [address]          Patient's Medications   Discharge Prescriptions    LIDOCAINE (LMX) 4 % CREAM    Apply topically as needed for moderate pain       Start Date: 8/25/2023 End Date: --       Order Dose: --       Quantity: 30 g    Refills: 0    NAPROXEN (NAPROSYN) 250 MG TABLET    Take 1 tablet (250 mg total) by mouth 3 (three) times a day with meals for 7 days       Start Date: 8/25/2023 End Date: 9/1/2023       Order Dose: 250 mg       Quantity: 21 tablet    Refills: 0       No discharge procedures on file.     PDMP Review       Value Time User    PDMP Reviewed  Yes 12/5/2020 12:26 PM Digna Alvarez MD          ED Provider  Electronically Signed by           Olga Giraldo MD  08/25/23 1976

## 2023-08-25 NOTE — Clinical Note
Kiran Resendiz was seen and treated in our emergency department on 8/25/2023. No restrictions            Diagnosis:     Nicolette Alford  may return to work on return date. She may return on this date: 08/27/2023         If you have any questions or concerns, please don't hesitate to call.       Josefina Vance    ______________________________           _______________          _______________  [facility] [place] Representative                              Date                                Time

## 2023-08-28 ENCOUNTER — VBI (OUTPATIENT)
Dept: ADMINISTRATIVE | Facility: OTHER | Age: 33
End: 2023-08-28

## 2023-08-28 NOTE — TELEPHONE ENCOUNTER
Gianni Howell    ED Visit Information     Ed visit date: 8/25/2023  Diagnosis Description: Inguinal strain, left, initial encounter  In Network? Yes [address]  Discharge status: Home  Discharged with meds ? Yes  Number of ED visits to date: 4  ED Severity:3     Outreach Information    Outreach successful: Yes 1  Date letter mailed:N/A  Date Finalized:8/28/2023    Care Coordination    Follow up appointment with pcp: no ED follow up not scheduled, patient states she is feeling better. Transportation issues ? No    [employer] type: 3 Day Outreach  Emergent necessity warranted by diagnosis: Yes  [facility] PCP: Yes  Transportation: Self Transport  If able to choose an ED.  Would you have chosen [facility]: Yes  Called PCP first?: No  Feels able to call PCP for urgent problems ?: Yes  Understands what emergencies can be handled by PCP ?: Yes  Ever any problems getting appointment with PCP for minor emergency/urgency problems?: No  Practice Contacted Patient ?: No  Pt had ED follow up with pcp/staff ?: No    Seen for follow-up out of network ?: No  Reason Patient went to ED instead of Urgent Care or PCP?: Perceived Severity of Illness  Urgent care Education?: Yes  08/28/2023 11:25 AM EDT by Nia Read 08/28/2023 11:25 AM EDT by Osmar Love (Self) [phone] (VPDTII)[phone] (Mobile)  [phone] (Mobile) Remove  - Call [employer] - ED outreach successful

## 2023-09-01 ENCOUNTER — OCCMED (OUTPATIENT)
Dept: URGENT CARE | Facility: CLINIC | Age: 33
End: 2023-09-01

## 2023-09-01 ENCOUNTER — APPOINTMENT (OUTPATIENT)
Dept: LAB | Facility: CLINIC | Age: 33
End: 2023-09-01

## 2023-09-01 DIAGNOSIS — Z02.1 PHYSICAL EXAM, PRE-EMPLOYMENT: ICD-10-CM

## 2023-09-01 DIAGNOSIS — Z02.1 PHYSICAL EXAM, PRE-EMPLOYMENT: Primary | ICD-10-CM

## 2023-09-01 LAB — RUBV IGG SERPL IA-ACNC: 32.1 IU/ML

## 2023-09-01 PROCEDURE — 36415 COLL VENOUS BLD VENIPUNCTURE: CPT

## 2023-09-01 PROCEDURE — 86480 TB TEST CELL IMMUN MEASURE: CPT

## 2023-09-01 PROCEDURE — 86787 VARICELLA-ZOSTER ANTIBODY: CPT

## 2023-09-01 PROCEDURE — 86735 MUMPS ANTIBODY: CPT

## 2023-09-01 PROCEDURE — 86762 RUBELLA ANTIBODY: CPT

## 2023-09-01 PROCEDURE — 86765 RUBEOLA ANTIBODY: CPT

## 2023-09-02 LAB
MEV IGG SER QL IA: NORMAL
MUV IGG SER QL IA: NORMAL
VZV IGG SER QL IA: NORMAL

## 2023-09-04 LAB
GAMMA INTERFERON BACKGROUND BLD IA-ACNC: 0.04 IU/ML
M TB IFN-G BLD-IMP: NEGATIVE
M TB IFN-G CD4+ BCKGRND COR BLD-ACNC: 0.01 IU/ML
M TB IFN-G CD4+ BCKGRND COR BLD-ACNC: 0.02 IU/ML
MITOGEN IGNF BCKGRD COR BLD-ACNC: >10 IU/ML

## 2023-10-17 ENCOUNTER — OFFICE VISIT (OUTPATIENT)
Dept: URGENT CARE | Age: 33
End: 2023-10-17
Payer: OTHER MISCELLANEOUS

## 2023-10-17 ENCOUNTER — APPOINTMENT (OUTPATIENT)
Dept: RADIOLOGY | Age: 33
End: 2023-10-17
Payer: OTHER MISCELLANEOUS

## 2023-10-17 DIAGNOSIS — M25.531 RIGHT WRIST PAIN: ICD-10-CM

## 2023-10-17 DIAGNOSIS — S63.501A SPRAIN OF RIGHT WRIST, INITIAL ENCOUNTER: Primary | ICD-10-CM

## 2023-10-17 PROCEDURE — 73110 X-RAY EXAM OF WRIST: CPT

## 2023-10-17 PROCEDURE — 99283 EMERGENCY DEPT VISIT LOW MDM: CPT

## 2023-10-17 PROCEDURE — G0382 LEV 3 HOSP TYPE B ED VISIT: HCPCS

## 2023-10-20 ENCOUNTER — APPOINTMENT (OUTPATIENT)
Dept: URGENT CARE | Age: 33
End: 2023-10-20
Payer: OTHER MISCELLANEOUS

## 2023-10-20 PROCEDURE — 99213 OFFICE O/P EST LOW 20 MIN: CPT

## 2023-11-20 ENCOUNTER — OFFICE VISIT (OUTPATIENT)
Dept: SURGERY | Facility: CLINIC | Age: 33
End: 2023-11-20
Payer: MEDICARE

## 2023-11-20 ENCOUNTER — PREP FOR PROCEDURE (OUTPATIENT)
Dept: SURGERY | Facility: CLINIC | Age: 33
End: 2023-11-20

## 2023-11-20 VITALS
WEIGHT: 200 LBS | BODY MASS INDEX: 35.44 KG/M2 | SYSTOLIC BLOOD PRESSURE: 135 MMHG | HEART RATE: 107 BPM | HEIGHT: 63 IN | DIASTOLIC BLOOD PRESSURE: 84 MMHG

## 2023-11-20 DIAGNOSIS — K43.2 INCISIONAL HERNIA: Primary | ICD-10-CM

## 2023-11-20 DIAGNOSIS — D22.9 SKIN MOLE: ICD-10-CM

## 2023-11-20 DIAGNOSIS — K43.2 INCISIONAL HERNIA WITHOUT OBSTRUCTION OR GANGRENE: Primary | ICD-10-CM

## 2023-11-20 PROCEDURE — 99212 OFFICE O/P EST SF 10 MIN: CPT | Performed by: SURGERY

## 2023-11-20 NOTE — PROGRESS NOTES
Assessment/Plan: Patient presents with a recurrent incisional hernia above the umbilicus. Last repair was not used with mesh. Re-repair is suggested with using mesh. Risks and benefits explained in detail. She is agreeable. She has discomfort associated with lifting and palpation at the region. She has a mole since birth at the umbilicus. This seems to have enlarged in size in the interim. Excision is recommended. There are no diagnoses linked to this encounter. Subjective:      Patient ID: Jason Farnsworth is a 28 y.o. female. Patient presents for ventral hernia consult. States she has had a bulge and intermittent sharp pain next to her abdominal incision for one month. Does not limit her activities. S/P incisional hernia repair 2/8/2023. The following portions of the patient's history were reviewed and updated as appropriate:     She  has a past medical history of COVID-19 (11/2021) and Endometriosis. She  has a past surgical history that includes pr laps abd prtm&omentum dx w/wo spec br/wa spx (N/A, 12/7/2020); pr laparoscopy w/rmvl adnexal structures (Right, 12/7/2020); CYSTOSCOPY (N/A, 12/7/2020); Laparoscopy (Bilateral, 12/7/2020); Oophorectomy (Right, 12/7/2020); and pr rpr aa hernia 1st < 3 cm reducible (N/A, 2/8/2023). Her family history includes Cancer in her mother. She  reports that she has never smoked. She has never used smokeless tobacco. She reports that she does not currently use alcohol. She reports that she does not use drugs.   Current Outpatient Medications   Medication Sig Dispense Refill    Apri 0.15-30 MG-MCG per tablet TAKE 1 TABLET BY MOUTH EVERY DAY 84 tablet 4    hydrocortisone 1 % cream Apply to affected area 2 times daily (Patient not taking: Reported on 3/27/2023) 15 g 0    lidocaine (LMX) 4 % cream Apply topically as needed for moderate pain 30 g 0    naproxen (NAPROSYN) 250 mg tablet Take 1 tablet (250 mg total) by mouth 3 (three) times a day with meals for 7 days 21 tablet 0    naproxen (Naprosyn) 500 mg tablet Take 1 tablet (500 mg total) by mouth 2 (two) times a day with meals for 3 days 6 tablet 0    nitrofurantoin (MACROBID) 100 mg capsule Take 1 capsule (100 mg total) by mouth 2 (two) times a day (Patient not taking: Reported on 4/5/2023) 10 capsule 0     No current facility-administered medications for this visit. She is allergic to wound dressing adhesive. .    Review of Systems   Constitutional: Negative. Negative for activity change. HENT: Negative. Eyes: Negative. Respiratory: Negative. Cardiovascular: Negative. Gastrointestinal:  Positive for abdominal pain. Endocrine: Negative. Genitourinary: Negative. Musculoskeletal: Negative. Skin: Negative. Allergic/Immunologic: Negative. Neurological: Negative. Psychiatric/Behavioral:  Negative for agitation, behavioral problems and confusion. The patient is not nervous/anxious. Objective:      /84   Pulse (!) 107   Ht 5' 3" (1.6 m)   Wt 90.7 kg (200 lb)   BMI 35.43 kg/m²          Physical Exam  Constitutional:       Appearance: Normal appearance. She is well-developed. HENT:      Head: Normocephalic and atraumatic. Nose: Nose normal.   Eyes:      Extraocular Movements: Extraocular movements intact. Conjunctiva/sclera: Conjunctivae normal.   Cardiovascular:      Rate and Rhythm: Normal rate and regular rhythm. Heart sounds: Normal heart sounds. Pulmonary:      Effort: Pulmonary effort is normal.      Breath sounds: Normal breath sounds. Abdominal:      General: Abdomen is flat. Hernia: A hernia (Incisional hernias present. This is tender to palpation. 1 cm mole at the umbilicus is notable.) is present. Musculoskeletal:      Right lower leg: No edema. Left lower leg: No edema. Skin:     General: Skin is warm and dry. Neurological:      Mental Status: She is alert and oriented to person, place, and time. Psychiatric:         Mood and Affect: Mood normal.

## 2023-11-20 NOTE — H&P (VIEW-ONLY)
Assessment/Plan: Patient presents with a recurrent incisional hernia above the umbilicus. Last repair was not used with mesh. Re-repair is suggested with using mesh. Risks and benefits explained in detail. She is agreeable. She has discomfort associated with lifting and palpation at the region. She has a mole since birth at the umbilicus. This seems to have enlarged in size in the interim. Excision is recommended. There are no diagnoses linked to this encounter. Subjective:      Patient ID: Colonel Leyden is a 28 y.o. female. Patient presents for ventral hernia consult. States she has had a bulge and intermittent sharp pain next to her abdominal incision for one month. Does not limit her activities. S/P incisional hernia repair 2/8/2023. The following portions of the patient's history were reviewed and updated as appropriate:     She  has a past medical history of COVID-19 (11/2021) and Endometriosis. She  has a past surgical history that includes pr laps abd prtm&omentum dx w/wo spec br/wa spx (N/A, 12/7/2020); pr laparoscopy w/rmvl adnexal structures (Right, 12/7/2020); CYSTOSCOPY (N/A, 12/7/2020); Laparoscopy (Bilateral, 12/7/2020); Oophorectomy (Right, 12/7/2020); and pr rpr aa hernia 1st < 3 cm reducible (N/A, 2/8/2023). Her family history includes Cancer in her mother. She  reports that she has never smoked. She has never used smokeless tobacco. She reports that she does not currently use alcohol. She reports that she does not use drugs.   Current Outpatient Medications   Medication Sig Dispense Refill    Apri 0.15-30 MG-MCG per tablet TAKE 1 TABLET BY MOUTH EVERY DAY 84 tablet 4    hydrocortisone 1 % cream Apply to affected area 2 times daily (Patient not taking: Reported on 3/27/2023) 15 g 0    lidocaine (LMX) 4 % cream Apply topically as needed for moderate pain 30 g 0    naproxen (NAPROSYN) 250 mg tablet Take 1 tablet (250 mg total) by mouth 3 (three) times a day with meals for 7 days 21 tablet 0    naproxen (Naprosyn) 500 mg tablet Take 1 tablet (500 mg total) by mouth 2 (two) times a day with meals for 3 days 6 tablet 0    nitrofurantoin (MACROBID) 100 mg capsule Take 1 capsule (100 mg total) by mouth 2 (two) times a day (Patient not taking: Reported on 4/5/2023) 10 capsule 0     No current facility-administered medications for this visit. She is allergic to wound dressing adhesive. .    Review of Systems   Constitutional: Negative. Negative for activity change. HENT: Negative. Eyes: Negative. Respiratory: Negative. Cardiovascular: Negative. Gastrointestinal:  Positive for abdominal pain. Endocrine: Negative. Genitourinary: Negative. Musculoskeletal: Negative. Skin: Negative. Allergic/Immunologic: Negative. Neurological: Negative. Psychiatric/Behavioral:  Negative for agitation, behavioral problems and confusion. The patient is not nervous/anxious. Objective:      /84   Pulse (!) 107   Ht 5' 3" (1.6 m)   Wt 90.7 kg (200 lb)   BMI 35.43 kg/m²          Physical Exam  Constitutional:       Appearance: Normal appearance. She is well-developed. HENT:      Head: Normocephalic and atraumatic. Nose: Nose normal.   Eyes:      Extraocular Movements: Extraocular movements intact. Conjunctiva/sclera: Conjunctivae normal.   Cardiovascular:      Rate and Rhythm: Normal rate and regular rhythm. Heart sounds: Normal heart sounds. Pulmonary:      Effort: Pulmonary effort is normal.      Breath sounds: Normal breath sounds. Abdominal:      General: Abdomen is flat. Hernia: A hernia (Incisional hernias present. This is tender to palpation. 1 cm mole at the umbilicus is notable.) is present. Musculoskeletal:      Right lower leg: No edema. Left lower leg: No edema. Skin:     General: Skin is warm and dry. Neurological:      Mental Status: She is alert and oriented to person, place, and time. Psychiatric:         Mood and Affect: Mood normal.

## 2023-11-20 NOTE — LETTER
November 20, 2023     MIMI Bedolla MD  [address] [facility]. [address] [facility]    Patient: Piper Sarmiento   YOB: 1990   Date of Visit: 11/20/2023       Dear Dr. Alphonso Paredes: Thank you for referring Jose Blas to me for evaluation. Below are my notes for this consultation. If you have questions, please do not hesitate to call me. I look forward to following your patient along with you. Sincerely,        Destiny Alamo MD        CC: No Recipients    Destiny Alamo MD  11/20/2023  2:23 PM  Sign when Signing Visit  Assessment/Plan: Patient presents with a recurrent incisional hernia above the umbilicus. Last repair was not used with mesh. Re-repair is suggested with using mesh. Risks and benefits explained in detail. She is agreeable. She has discomfort associated with lifting and palpation at the region. She has a mole since birth at the umbilicus. This seems to have enlarged in size in the interim. Excision is recommended. There are no diagnoses linked to this encounter. Subjective:      Patient ID: Piper Sarmiento is a 28 y.o. female. Patient presents for ventral hernia consult. States she has had a bulge and intermittent sharp pain next to her abdominal incision for one month. Does not limit her activities. S/P incisional hernia repair 2/8/2023. The following portions of the patient's history were reviewed and updated as appropriate:     She  has a past medical history of COVID-19 (11/2021) and Endometriosis. She  has a past surgical history that includes pr laps abd prtm&omentum dx w/wo spec br/wa spx (N/A, 12/7/2020); pr laparoscopy w/rmvl adnexal structures (Right, 12/7/2020); CYSTOSCOPY (N/A, 12/7/2020); Laparoscopy (Bilateral, 12/7/2020); Oophorectomy (Right, 12/7/2020); and pr rpr aa hernia 1st < 3 cm reducible (N/A, 2/8/2023). Her family history includes Cancer in her mother.   She  reports that she has never smoked. She has never used smokeless tobacco. She reports that she does not currently use alcohol. She reports that she does not use drugs. Current Outpatient Medications   Medication Sig Dispense Refill   • Apri 0.15-30 MG-MCG per tablet TAKE 1 TABLET BY MOUTH EVERY DAY 84 tablet 4   • hydrocortisone 1 % cream Apply to affected area 2 times daily (Patient not taking: Reported on 3/27/2023) 15 g 0   • lidocaine (LMX) 4 % cream Apply topically as needed for moderate pain 30 g 0   • naproxen (NAPROSYN) 250 mg tablet Take 1 tablet (250 mg total) by mouth 3 (three) times a day with meals for 7 days 21 tablet 0   • naproxen (Naprosyn) 500 mg tablet Take 1 tablet (500 mg total) by mouth 2 (two) times a day with meals for 3 days 6 tablet 0   • nitrofurantoin (MACROBID) 100 mg capsule Take 1 capsule (100 mg total) by mouth 2 (two) times a day (Patient not taking: Reported on 4/5/2023) 10 capsule 0     No current facility-administered medications for this visit. She is allergic to wound dressing adhesive. .    Review of Systems   Constitutional: Negative. Negative for activity change. HENT: Negative. Eyes: Negative. Respiratory: Negative. Cardiovascular: Negative. Gastrointestinal:  Positive for abdominal pain. Endocrine: Negative. Genitourinary: Negative. Musculoskeletal: Negative. Skin: Negative. Allergic/Immunologic: Negative. Neurological: Negative. Psychiatric/Behavioral:  Negative for agitation, behavioral problems and confusion. The patient is not nervous/anxious. Objective:      /84   Pulse (!) 107   Ht 5' 3" (1.6 m)   Wt 90.7 kg (200 lb)   BMI 35.43 kg/m²          Physical Exam  Constitutional:       Appearance: Normal appearance. She is well-developed. HENT:      Head: Normocephalic and atraumatic. Nose: Nose normal.   Eyes:      Extraocular Movements: Extraocular movements intact.       Conjunctiva/sclera: Conjunctivae normal.   Cardiovascular: Rate and Rhythm: Normal rate and regular rhythm. Heart sounds: Normal heart sounds. Pulmonary:      Effort: Pulmonary effort is normal.      Breath sounds: Normal breath sounds. Abdominal:      General: Abdomen is flat. Hernia: A hernia (Incisional hernias present. This is tender to palpation. 1 cm mole at the umbilicus is notable.) is present. Musculoskeletal:      Right lower leg: No edema. Left lower leg: No edema. Skin:     General: Skin is warm and dry. Neurological:      Mental Status: She is alert and oriented to person, place, and time.    Psychiatric:         Mood and Affect: Mood normal.

## 2023-12-03 RX ORDER — OXYCODONE HYDROCHLORIDE AND ACETAMINOPHEN 5; 325 MG/1; MG/1
1 TABLET ORAL EVERY 4 HOURS PRN
Qty: 10 TABLET | Refills: 0 | Status: SHIPPED | OUTPATIENT
Start: 2023-12-03

## 2023-12-04 NOTE — DISCHARGE INSTR - AVS FIRST PAGE
Please call the office when you leave to schedule an appointment for 2 weeks. Please call [phone]. [address], [address], [place], [address]. Off of [address] between [facility] and [facility]. Activity:    May lift 10 lb as many times as desired the 1st week,       20 lb in 2 weeks,       30 lb in 3 weeks. Walking is encouraged  Normal daily activities including climbing steps are okay  Do not engage in strenuous activity ( sit-ups or crutches) or contact sports for 4-6 weeks post-operatively    Return to Work:   Okay to return to work when you feel well if you desire. Diet:   You may return to your normal healthy diet. Wound Care: Your wound is closed with dissolvable stitches and glue. It is okay to shower. Wash incision gently with soap and water and pat dry. Do not soak incisions in bath water or swim for two weeks. Do not apply any creams or ointments. Pain Medication:   Please take as directed if needed. May use Advil or Motrin in addition. Recall, the pain medicine and anesthesia is associated with constipation. No driving while taking narcotic pain medications. Other: It is normal to developed a “healing ridge” / firm incision after surgery. This is your body making scar tissue. It is a good sign  Constipation is very common after general anesthesia. Please use milk of magnesia as needed in order to help prevent constipation. It is normal to get bruising after surgery. If you have questions after discharge please call the office.     If you have increased pain, fever >101.5, increased drainage, redness or a bad smell at your surgery site, please call us immediately or come directly to the Emergency Room

## 2023-12-05 ENCOUNTER — ANESTHESIA EVENT (OUTPATIENT)
Dept: PERIOP | Facility: HOSPITAL | Age: 33
End: 2023-12-05
Payer: MEDICARE

## 2023-12-05 NOTE — PRE-PROCEDURE INSTRUCTIONS
Pre-Surgery Instructions:   Medication Instructions    Apri 0.15-30 MG-MCG per tablet Take Day of Surgery; Continue to take as prescribed including DOS with a small sip of water, unless usually taken at night     oxyCODONE-acetaminophen (PERCOCET) 5-325 mg per tablet Take Day of Surgery; Continue to take as prescribed including DOS with a small sip of water, unless usually taken at night     Medication instructions for day surgery reviewed. Please use only a sip of water to take your instructed medications. Avoid all over the counter vitamins, supplements and NSAIDS for one week prior to surgery per anesthesia guidelines. Tylenol is ok to take as needed. You will receive a call one business day prior to surgery with an arrival time and hospital directions. If your surgery is scheduled on a Monday, the hospital will be calling you on the Friday prior to your surgery. If you have not heard from anyone by 8pm, please call the hospital supervisor through the hospital  at [phone]. Donnie Cordero [phone]). Do not eat or drink anything after midnight the night before your surgery, including candy, mints, lifesavers, or chewing gum. Do not drink alcohol 24hrs before your surgery. Try not to smoke at least 24hrs before your surgery. Follow the pre surgery showering instructions as listed in the [facility] Surgical Experience Booklet” or otherwise provided by your surgeon's office. Do not use a blade to shave the surgical area 1 week before surgery. It is okay to use a clean electric clippers up to 24 hours before surgery. Do not apply any lotions, creams, including makeup, cologne, deodorant, or perfumes after showering on the day of your surgery. Do not use dry shampoo, hair spray, hair gel, or any type of hair products. No contact lenses, eye make-up, or artificial eyelashes. Remove nail polish, including gel polish, and any artificial, gel, or acrylic nails if possible.  Remove all jewelry including rings and body piercing jewelry. Wear causal clothing that is easy to take on and off. Consider your type of surgery. Keep any valuables, jewelry, piercings at home. Please bring any specially ordered equipment (sling, braces) if indicated. Arrange for a responsible person to drive you to and from the hospital on the day of your surgery. Visitor Guidelines discussed. Call the surgeon's office with any new illnesses, exposures, or additional questions prior to surgery. Please reference your [facility] Surgical Experience Booklet” for additional information to prepare for your upcoming surgery.

## 2023-12-06 ENCOUNTER — ANESTHESIA (OUTPATIENT)
Dept: PERIOP | Facility: HOSPITAL | Age: 33
End: 2023-12-06
Payer: MEDICARE

## 2023-12-06 ENCOUNTER — HOSPITAL ENCOUNTER (OUTPATIENT)
Facility: HOSPITAL | Age: 33
Setting detail: OUTPATIENT SURGERY
Discharge: HOME | End: 2023-12-06
Attending: SURGERY | Admitting: SURGERY
Payer: MEDICARE

## 2023-12-06 VITALS
SYSTOLIC BLOOD PRESSURE: 124 MMHG | OXYGEN SATURATION: 98 % | TEMPERATURE: 98.9 F | DIASTOLIC BLOOD PRESSURE: 78 MMHG | HEART RATE: 87 BPM | RESPIRATION RATE: 18 BRPM

## 2023-12-06 DIAGNOSIS — K43.2 INCISIONAL HERNIA: ICD-10-CM

## 2023-12-06 DIAGNOSIS — K43.2 INCISIONAL HERNIA WITHOUT OBSTRUCTION OR GANGRENE: Primary | ICD-10-CM

## 2023-12-06 DIAGNOSIS — D22.9 SKIN MOLE: ICD-10-CM

## 2023-12-06 PROCEDURE — 22903 EXC ABD LES SC 3 CM/>: CPT | Performed by: SURGERY

## 2023-12-06 PROCEDURE — C1781 MESH (IMPLANTABLE): HCPCS | Performed by: SURGERY

## 2023-12-06 PROCEDURE — 49591 RPR AA HRN 1ST < 3 CM RDC: CPT | Performed by: SURGERY

## 2023-12-06 PROCEDURE — 88305 TISSUE EXAM BY PATHOLOGIST: CPT | Performed by: PATHOLOGY

## 2023-12-06 DEVICE — VENTRALEX ST HERNIA PATCH
Type: IMPLANTABLE DEVICE | Site: ABDOMEN | Status: FUNCTIONAL
Brand: VENTRALEX ST HERNIA PATCH

## 2023-12-06 RX ORDER — MAGNESIUM HYDROXIDE 1200 MG/15ML
LIQUID ORAL AS NEEDED
Status: DISCONTINUED | OUTPATIENT
Start: 2023-12-06 | End: 2023-12-06 | Stop reason: HOSPADM

## 2023-12-06 RX ORDER — HYDROCODONE BITARTRATE AND ACETAMINOPHEN 5; 325 MG/1; MG/1
TABLET ORAL
Status: COMPLETED
Start: 2023-12-06 | End: 2023-12-06

## 2023-12-06 RX ORDER — LIDOCAINE HYDROCHLORIDE 10 MG/ML
INJECTION, SOLUTION EPIDURAL; INFILTRATION; INTRACAUDAL; PERINEURAL AS NEEDED
Status: DISCONTINUED | OUTPATIENT
Start: 2023-12-06 | End: 2023-12-06

## 2023-12-06 RX ORDER — CEFAZOLIN SODIUM 2 G/50ML
SOLUTION INTRAVENOUS AS NEEDED
Status: DISCONTINUED | OUTPATIENT
Start: 2023-12-06 | End: 2023-12-06

## 2023-12-06 RX ORDER — CEFAZOLIN SODIUM 2 G/50ML
2000 SOLUTION INTRAVENOUS ONCE
Status: DISCONTINUED | OUTPATIENT
Start: 2023-12-06 | End: 2023-12-06 | Stop reason: HOSPADM

## 2023-12-06 RX ORDER — ONDANSETRON 2 MG/ML
4 INJECTION INTRAMUSCULAR; INTRAVENOUS EVERY 4 HOURS PRN
Status: DISCONTINUED | OUTPATIENT
Start: 2023-12-06 | End: 2023-12-06 | Stop reason: HOSPADM

## 2023-12-06 RX ORDER — SODIUM CHLORIDE, SODIUM LACTATE, POTASSIUM CHLORIDE, CALCIUM CHLORIDE 600; 310; 30; 20 MG/100ML; MG/100ML; MG/100ML; MG/100ML
100 INJECTION, SOLUTION INTRAVENOUS CONTINUOUS
Status: DISCONTINUED | OUTPATIENT
Start: 2023-12-06 | End: 2023-12-06 | Stop reason: HOSPADM

## 2023-12-06 RX ORDER — HYDROCODONE BITARTRATE AND ACETAMINOPHEN 5; 325 MG/1; MG/1
1 TABLET ORAL EVERY 6 HOURS PRN
Status: DISCONTINUED | OUTPATIENT
Start: 2023-12-06 | End: 2023-12-06 | Stop reason: HOSPADM

## 2023-12-06 RX ORDER — ACETAMINOPHEN 325 MG/1
650 TABLET ORAL EVERY 4 HOURS PRN
Status: DISCONTINUED | OUTPATIENT
Start: 2023-12-06 | End: 2023-12-06 | Stop reason: HOSPADM

## 2023-12-06 RX ORDER — FENTANYL CITRATE/PF 50 MCG/ML
50 SYRINGE (ML) INJECTION
Status: DISCONTINUED | OUTPATIENT
Start: 2023-12-06 | End: 2023-12-06 | Stop reason: HOSPADM

## 2023-12-06 RX ORDER — ONDANSETRON 2 MG/ML
4 INJECTION INTRAMUSCULAR; INTRAVENOUS ONCE AS NEEDED
Status: DISCONTINUED | OUTPATIENT
Start: 2023-12-06 | End: 2023-12-06 | Stop reason: HOSPADM

## 2023-12-06 RX ORDER — SODIUM CHLORIDE, SODIUM LACTATE, POTASSIUM CHLORIDE, CALCIUM CHLORIDE 600; 310; 30; 20 MG/100ML; MG/100ML; MG/100ML; MG/100ML
125 INJECTION, SOLUTION INTRAVENOUS CONTINUOUS
Status: DISCONTINUED | OUTPATIENT
Start: 2023-12-06 | End: 2023-12-06 | Stop reason: HOSPADM

## 2023-12-06 RX ORDER — FENTANYL CITRATE 50 UG/ML
INJECTION, SOLUTION INTRAMUSCULAR; INTRAVENOUS AS NEEDED
Status: DISCONTINUED | OUTPATIENT
Start: 2023-12-06 | End: 2023-12-06

## 2023-12-06 RX ORDER — BUPIVACAINE HYDROCHLORIDE 2.5 MG/ML
INJECTION, SOLUTION EPIDURAL; INFILTRATION; INTRACAUDAL AS NEEDED
Status: DISCONTINUED | OUTPATIENT
Start: 2023-12-06 | End: 2023-12-06 | Stop reason: HOSPADM

## 2023-12-06 RX ORDER — KETOROLAC TROMETHAMINE 30 MG/ML
INJECTION, SOLUTION INTRAMUSCULAR; INTRAVENOUS AS NEEDED
Status: DISCONTINUED | OUTPATIENT
Start: 2023-12-06 | End: 2023-12-06

## 2023-12-06 RX ORDER — SODIUM CHLORIDE, SODIUM LACTATE, POTASSIUM CHLORIDE, CALCIUM CHLORIDE 600; 310; 30; 20 MG/100ML; MG/100ML; MG/100ML; MG/100ML
INJECTION, SOLUTION INTRAVENOUS CONTINUOUS PRN
Status: DISCONTINUED | OUTPATIENT
Start: 2023-12-06 | End: 2023-12-06

## 2023-12-06 RX ORDER — HYDROMORPHONE HCL/PF 1 MG/ML
0.5 SYRINGE (ML) INJECTION
Status: DISCONTINUED | OUTPATIENT
Start: 2023-12-06 | End: 2023-12-06 | Stop reason: HOSPADM

## 2023-12-06 RX ORDER — FUROSEMIDE 10 MG/ML
INJECTION INTRAMUSCULAR; INTRAVENOUS AS NEEDED
Status: DISCONTINUED | OUTPATIENT
Start: 2023-12-06 | End: 2023-12-06

## 2023-12-06 RX ORDER — PROPOFOL 10 MG/ML
INJECTION, EMULSION INTRAVENOUS AS NEEDED
Status: DISCONTINUED | OUTPATIENT
Start: 2023-12-06 | End: 2023-12-06

## 2023-12-06 RX ORDER — ONDANSETRON 2 MG/ML
INJECTION INTRAMUSCULAR; INTRAVENOUS AS NEEDED
Status: DISCONTINUED | OUTPATIENT
Start: 2023-12-06 | End: 2023-12-06

## 2023-12-06 RX ORDER — DEXAMETHASONE SODIUM PHOSPHATE 10 MG/ML
INJECTION, SOLUTION INTRAMUSCULAR; INTRAVENOUS AS NEEDED
Status: DISCONTINUED | OUTPATIENT
Start: 2023-12-06 | End: 2023-12-06

## 2023-12-06 RX ORDER — ACETAMINOPHEN 325 MG/1
975 TABLET ORAL ONCE
Status: COMPLETED | OUTPATIENT
Start: 2023-12-06 | End: 2023-12-06

## 2023-12-06 RX ORDER — MIDAZOLAM HYDROCHLORIDE 2 MG/2ML
INJECTION, SOLUTION INTRAMUSCULAR; INTRAVENOUS AS NEEDED
Status: DISCONTINUED | OUTPATIENT
Start: 2023-12-06 | End: 2023-12-06

## 2023-12-06 RX ADMIN — SODIUM CHLORIDE, SODIUM LACTATE, POTASSIUM CHLORIDE, AND CALCIUM CHLORIDE: .6; .31; .03; .02 INJECTION, SOLUTION INTRAVENOUS at 14:19

## 2023-12-06 RX ADMIN — DEXMEDETOMIDINE HYDROCHLORIDE 8 MCG: 100 INJECTION, SOLUTION INTRAVENOUS at 14:23

## 2023-12-06 RX ADMIN — MIDAZOLAM 2 MG: 1 INJECTION INTRAMUSCULAR; INTRAVENOUS at 14:18

## 2023-12-06 RX ADMIN — CEFAZOLIN SODIUM 2000 MG: 2 SOLUTION INTRAVENOUS at 14:26

## 2023-12-06 RX ADMIN — FENTANYL CITRATE 100 MCG: 50 INJECTION INTRAMUSCULAR; INTRAVENOUS at 14:23

## 2023-12-06 RX ADMIN — ONDANSETRON 4 MG: 2 INJECTION INTRAMUSCULAR; INTRAVENOUS at 14:28

## 2023-12-06 RX ADMIN — DEXAMETHASONE SODIUM PHOSPHATE 10 MG: 10 INJECTION, SOLUTION INTRAMUSCULAR; INTRAVENOUS at 14:28

## 2023-12-06 RX ADMIN — HYDROCODONE BITARTRATE AND ACETAMINOPHEN 1 TABLET: 5; 325 TABLET ORAL at 16:20

## 2023-12-06 RX ADMIN — KETOROLAC TROMETHAMINE 15 MG: 30 INJECTION, SOLUTION INTRAMUSCULAR; INTRAVENOUS at 14:33

## 2023-12-06 RX ADMIN — PROPOFOL 200 MG: 10 INJECTION, EMULSION INTRAVENOUS at 14:23

## 2023-12-06 RX ADMIN — FUROSEMIDE 5 MG: 10 INJECTION, SOLUTION INTRAMUSCULAR; INTRAVENOUS at 15:07

## 2023-12-06 RX ADMIN — LIDOCAINE HYDROCHLORIDE 100 MG: 10 INJECTION, SOLUTION EPIDURAL; INFILTRATION; INTRACAUDAL; PERINEURAL at 14:23

## 2023-12-06 RX ADMIN — FENTANYL CITRATE 50 MCG: 50 INJECTION INTRAMUSCULAR; INTRAVENOUS at 15:36

## 2023-12-06 RX ADMIN — FENTANYL CITRATE 50 MCG: 50 INJECTION INTRAMUSCULAR; INTRAVENOUS at 14:48

## 2023-12-06 RX ADMIN — ACETAMINOPHEN 975 MG: 325 TABLET, FILM COATED ORAL at 11:24

## 2023-12-06 NOTE — ANESTHESIA PREPROCEDURE EVALUATION
Procedure:  REPAIR HERNIA INCISIONAL (Abdomen)  EXCISION  BIOPSY LESION/MASS ABDOMINAL/CHEST WALL - umbilical area (Abdomen)    Relevant Problems   No relevant active problems      Incisional hernia   Obesity, BMI 35    Lab Results   Component Value Date    WBC 16.44 (H) 03/27/2023    HGB 9.2 (L) 03/27/2023    HCT 33.5 (L) 03/27/2023    MCV 59 (L) 03/27/2023     03/27/2023     Lab Results   Component Value Date    SODIUM 138 03/27/2023    K 3.5 03/27/2023     03/27/2023    CO2 24 03/27/2023    BUN 8 03/27/2023    CREATININE 0.62 03/27/2023    GLUC 106 03/27/2023    CALCIUM 9.1 03/27/2023     Lab Results   Component Value Date    INR 1.05 11/04/2020    PROTIME 13.5 11/04/2020     No results found for: "HGBA1C"       Physical Exam    Airway       Dental       Cardiovascular      Pulmonary      Other Findings  post-pubertal.      Anesthesia Plan  ASA Score- 2     Anesthesia Type- general with ASA Monitors. Additional Monitors:     Airway Plan: ETT. Plan Factors-Exercise tolerance (METS): >4 METS. Chart reviewed. EKG reviewed. Existing labs reviewed. Patient summary reviewed. Patient is not a current smoker. Obstructive sleep apnea risk education given perioperatively. Induction- intravenous. Postoperative Plan- Plan for postoperative opioid use. Planned trial extubation    Informed Consent- Anesthetic plan and risks discussed with patient. I personally reviewed this patient with the CRNA. Discussed and agreed on the Anesthesia Plan with the CRNA. Marilyn Thakur

## 2023-12-06 NOTE — ANESTHESIA POSTPROCEDURE EVALUATION
Post-Op Assessment Note    CV Status:  Stable    Pain management: adequate       Mental Status:  Sleepy and arousable   Hydration Status:  Euvolemic   PONV Controlled:  Controlled   Airway Patency:  Patent  Two or more mitigation strategies used for obstructive sleep apnea   Post Op Vitals Reviewed: Yes      Staff: Anesthesiologist, CRNA               BP      Temp     Pulse     Resp      SpO2

## 2023-12-06 NOTE — OP NOTE
OPERATIVE REPORT  PATIENT NAME: Eunice Martins    :  1990  MRN: [id number]  Pt Location: AN OR ROOM 04    SURGERY DATE: 2023    Surgeon(s) and Role:     Raul Martinez MD - Primary     * Kenyetta Hernandez DO - Assisting    Preop Diagnosis:  Incisional hernia K43.2  Skin mole D22.9    Post-Op Diagnosis Codes:     * Incisional hernia [K43.2]     * Skin mole [D22.9]    Procedure(s):  REPAIR HERNIA INCISIONAL  EXCISION  BIOPSY LESION/MASS ABDOMINAL/CHEST WALL - umbilical area    Specimen(s):  ID Type Source Tests Collected by Time Destination   1 : Umbilicus; Stitch marks 12 o'clock Tissue Soft Tissue, Other TISSUE EXAM Raul Martinez MD 2023  2:42 PM        Estimated Blood Loss:   Minimal    Drains:  * No LDAs found *    Anesthesia Type:   General    Operative Indications:  Incisional hernia K43.2  Skin mole D22.9    Independent, non-smoker, ASA 2, wound class II, BMI 36, weight 200, height 63  GERD sleep apnea      Complications:   None    Procedure and Technique:  Patient was notified visually by armband. Placed in supine position. After anesthesia the abdomen was prepped and draped in sterile fashion. 1/4% Marcaine with epinephrine was utilized throughout the procedure. Incision and carried down through skin and subcutaneous tissue about the umbilicus. The umbilicus was removed. The dark skin lesion at the base of the umbilicus measures 11 mm. Margins are 3 mm in all directions. This was carried down to the fascia and then amputated. The suture was used to orient the specimen and it was sent for pathologic review. Incisional hernia was located above the umbilicus. The hernia opening measures 2.2 x 2 cm in size. This was reduced. The hernia sac was amputated and removed. Omentum was pexied inferiorly against the peritoneum. Polypropylene composite mesh was then inserted. The hernia was then repaired with interrupted figure-of-eight #1 Vicryl suture.   This followed by 3-0 Vicryl subcutaneous and 4-0 Monocryl sutures fashioning a new umbilical hernia. The patient tolerated this procedure well. Sponge instrument count correct x 2. I was present for the entire procedure.     Patient Disposition:  PACU         SIGNATURE: Yolanda Beach MD  DATE: December 6, 2023  TIME: 3:10 PM

## 2023-12-06 NOTE — INTERVAL H&P NOTE
H&P reviewed. After examining the patient I find no changes in the patients condition since the H&P had been written.     Vitals:    12/06/23 1114   BP: 160/87   Pulse: 91   Resp: 16   Temp: (!) 97.4 °F (36.3 °C)   SpO2: 100%

## 2023-12-07 NOTE — ANESTHESIA PROCEDURE NOTES
Anesthesia Notable Event  No anethesia notable event occurred.     Date/Time: 12/6/2023 3:26 PM    Performed by: Susie Morillo MD  Authorized by: Susie Morillo MD

## 2023-12-11 PROCEDURE — 88305 TISSUE EXAM BY PATHOLOGIST: CPT | Performed by: PATHOLOGY

## 2024-01-08 ENCOUNTER — OFFICE VISIT (OUTPATIENT)
Dept: SURGERY | Facility: CLINIC | Age: 34
End: 2024-01-08

## 2024-01-08 DIAGNOSIS — K43.2 INCISIONAL HERNIA WITHOUT OBSTRUCTION OR GANGRENE: Primary | ICD-10-CM

## 2024-01-08 PROCEDURE — 99024 POSTOP FOLLOW-UP VISIT: CPT | Performed by: SURGERY

## 2024-01-08 NOTE — PROGRESS NOTES
Assessment/Plan: Patient recently underwent repair of an incisional hernia as well as excision of an endometrial implant.  She returns today for follow-up visit.  Overall she feels well.  She advance her activities nicely.  Incisions clean healing well.  All questions answered.    There are no diagnoses linked to this encounter.    Pathology: Reviewed with patient, all questions answered.       Postoperative restrictions reviewed. All questions answered.       ______________________________________________________  HPI: Patient presents post operatively.  Incisional hernia repair and excision biopsy lesion/mass umbilical area 12/6/2023.   Final Diagnosis  A. Soft Tissue lesion, Umbilicus; Stitch marks 12 o'clock, excision:  - Endometriosis, involves 6 o'clock tip margin.    -- Associated acute and chronic inflammation, hemorrhage and siderosis.  - Negative for malignancy.                          ROS:  General ROS: negative for - chills, fatigue, fever or night sweats, weight loss  Respiratory ROS: no cough, shortness of breath, or wheezing  Cardiovascular ROS: no chest pain or dyspnea on exertion  Genito-Urinary ROS: no dysuria, trouble voiding, or hematuria  Musculoskeletal ROS: negative for - gait disturbance, joint pain or muscle pain  Neurological ROS: no TIA or stroke symptoms  GI ROS: see HPI  Skin ROS: no new rashes or lesions   Lymphatic ROS: no new adenopathy noted by pt.   GYN ROS: see HPI, no new GYN history or bleeding noted  Psy ROS: no new mental or behavioral disturbances         Patient Active Problem List   Diagnosis    Incisional hernia without obstruction or gangrene    Family history of breast cancer in first degree relative       Allergies:  Wound dressing adhesive      Current Outpatient Medications:     Apri 0.15-30 MG-MCG per tablet, TAKE 1 TABLET BY MOUTH EVERY DAY, Disp: 84 tablet, Rfl: 4    oxyCODONE-acetaminophen (PERCOCET) 5-325 mg per tablet, Take 1 tablet by mouth every 4 (four) hours  as needed for moderate pain for up to 10 doses Max Daily Amount: 6 tablets, Disp: 10 tablet, Rfl: 0    Past Medical History:   Diagnosis Date    COVID-19 11/2021    mild s/s-fully vaccinated    Endometriosis        Past Surgical History:   Procedure Laterality Date    CYSTOSCOPY N/A 12/7/2020    Procedure: CYSTOSCOPY;  Surgeon: Armond Stone DO;  [facility];  Service: Gynecology    OOPHORECTOMY Right 12/7/2020    Procedure: OOPHORECTOMY, LAPAROSCOPIC;  Surgeon: Armond Stone DO;  [facility];  Service: Gynecology    PELVIC LAPAROSCOPY Bilateral 12/7/2020    Procedure: CYSTECTOMY OVARIAN, LAPAROSCOPIC;  Surgeon: Armond Stone DO;  [facility];  Service: Gynecology    AR EXC B9 LESION MRGN XCP SK TG T/A/L >4.0 CM N/A 12/6/2023    Procedure: EXCISION  BIOPSY LESION/MASS ABDOMINAL/CHEST WALL - umbilical area;  Surgeon: Aldo Darling MD;  [facility];  Service: General    AR LAPAROSCOPY W/RMVL ADNEXAL STRUCTURES Right 12/7/2020    Procedure: SALPINGECTOMY, LAPAROSCOPIC;  Surgeon: Armond Stone DO;  [facility];  Service: Gynecology    AR LAPS ABD PRTM&OMENTUM DX W/WO SPEC BR/WA SPX N/A 12/7/2020    Procedure: LAPAROSCOPY DIAGNOSTIC;  Surgeon: Armond Stone DO;  [facility];  Service: Gynecology    AR RPR AA HERNIA 1ST < 3 CM REDUCIBLE N/A 2/8/2023    Procedure: REPAIR HERNIA INCISIONAL;  Surgeon: Aldo Darling MD;  [facility];  Service: General    AR RPR AA HERNIA 1ST < 3 CM REDUCIBLE N/A 12/6/2023    Procedure: REPAIR HERNIA INCISIONAL;  Surgeon: Aldo Darling MD;  [facility];  Service: General       Family History   Problem Relation Age of Onset    Cancer Mother 50        brain    Breast cancer Neg Hx     Colon cancer Neg Hx     Ovarian cancer Neg Hx         reports that she has never smoked. She has never used smokeless tobacco. She reports that she does not currently use alcohol. She reports that she  does not use drugs.    PHYSICAL EXAM    There were no vitals taken for this visit.    General: normal, cooperative, no distress  Abdominal: soft, nondistended, or nontender  Incision: clean, dry, and intact and healing well      Aldo Darling MD    Date: 1/8/2024 Time: 9:21 AM

## 2024-05-29 DIAGNOSIS — N80.9 ENDOMETRIOSIS: ICD-10-CM

## 2024-05-29 RX ORDER — DESOGESTREL AND ETHINYL ESTRADIOL 0.15-0.03
KIT ORAL
Qty: 84 TABLET | Refills: 0 | Status: SHIPPED | OUTPATIENT
Start: 2024-05-29

## 2024-07-25 ENCOUNTER — ANNUAL EXAM (OUTPATIENT)
Dept: OBGYN CLINIC | Facility: CLINIC | Age: 34
End: 2024-07-25

## 2024-07-25 VITALS
DIASTOLIC BLOOD PRESSURE: 84 MMHG | HEIGHT: 63 IN | BODY MASS INDEX: 35.65 KG/M2 | WEIGHT: 201.2 LBS | SYSTOLIC BLOOD PRESSURE: 132 MMHG | HEART RATE: 91 BPM

## 2024-07-25 DIAGNOSIS — Z12.39 ENCOUNTER FOR BREAST CANCER SCREENING USING NON-MAMMOGRAM MODALITY: ICD-10-CM

## 2024-07-25 DIAGNOSIS — Z12.4 SCREENING FOR CERVICAL CANCER: ICD-10-CM

## 2024-07-25 DIAGNOSIS — Z80.3 FAMILY HISTORY OF BREAST CANCER IN FIRST DEGREE RELATIVE: ICD-10-CM

## 2024-07-25 DIAGNOSIS — Z11.3 SCREEN FOR STD (SEXUALLY TRANSMITTED DISEASE): ICD-10-CM

## 2024-07-25 DIAGNOSIS — N80.9 ENDOMETRIOSIS: ICD-10-CM

## 2024-07-25 DIAGNOSIS — Z80.3 FAMILY HISTORY OF BREAST CANCER IN FIRST DEGREE RELATIVE: Primary | ICD-10-CM

## 2024-07-25 DIAGNOSIS — Z01.419 ENCOUNTER FOR GYNECOLOGICAL EXAMINATION WITHOUT ABNORMAL FINDING: Primary | ICD-10-CM

## 2024-07-25 PROBLEM — K43.2 INCISIONAL HERNIA WITHOUT OBSTRUCTION OR GANGRENE: Status: RESOLVED | Noted: 2023-01-30 | Resolved: 2024-07-25

## 2024-07-25 PROCEDURE — 99395 PREV VISIT EST AGE 18-39: CPT | Performed by: NURSE PRACTITIONER

## 2024-07-25 PROCEDURE — 90471 IMMUNIZATION ADMIN: CPT | Performed by: NURSE PRACTITIONER

## 2024-07-25 PROCEDURE — 90651 9VHPV VACCINE 2/3 DOSE IM: CPT | Performed by: NURSE PRACTITIONER

## 2024-07-25 RX ORDER — DESOGESTREL AND ETHINYL ESTRADIOL 0.15-0.03
1 KIT ORAL DAILY
Qty: 84 TABLET | Refills: 4 | Status: SHIPPED | OUTPATIENT
Start: 2024-07-25

## 2024-07-25 NOTE — PROGRESS NOTES
ANNUAL GYNECOLOGICAL EXAMINATION    Marian Stacy is a 33 y.o. female who presents today for annual GYN exam.  Her last pap smear was performed 2022 and result was NILM with negative HPV.  She reports no history of abnormal pap smears in her past.  I have no documentation of HIV screening in her past.  She reports menses as regular.  Her general medical history has been reviewed and she reports it as follows:    Past Medical History:   Diagnosis Date    Endometriosis      Past Surgical History:   Procedure Laterality Date    CYSTOSCOPY N/A 2020    Procedure: CYSTOSCOPY;  Surgeon: Armond Stone DO;  [facility];  Service: Gynecology    OOPHORECTOMY Right 2020    Procedure: OOPHORECTOMY, LAPAROSCOPIC;  Surgeon: Armond Stone DO;  [facility];  Service: Gynecology    PELVIC LAPAROSCOPY Bilateral 2020    Procedure: CYSTECTOMY OVARIAN, LAPAROSCOPIC;  Surgeon: Armond Stone DO;  [facility];  Service: Gynecology    LA EXC B9 LESION MRGN XCP SK TG T/A/L >4.0 CM N/A 2023    Procedure: EXCISION  BIOPSY LESION/MASS ABDOMINAL/CHEST WALL - umbilical area;  Surgeon: Aldo Darling MD;  [facility];  Service: General    LA LAPAROSCOPY W/RMVL ADNEXAL STRUCTURES Right 2020    Procedure: SALPINGECTOMY, LAPAROSCOPIC;  Surgeon: Armond Stone DO;  [facility];  Service: Gynecology    LA LAPS ABD PRTM&OMENTUM DX W/WO SPEC BR/WA SPX N/A 2020    Procedure: LAPAROSCOPY DIAGNOSTIC;  Surgeon: Armond Stone DO;  [facility];  Service: Gynecology    LA RPR AA HERNIA 1ST < 3 CM REDUCIBLE N/A 2023    Procedure: REPAIR HERNIA INCISIONAL;  Surgeon: Aldo Darling MD;  [facility];  Service: General    LA RPR AA HERNIA 1ST < 3 CM REDUCIBLE N/A 2023    Procedure: REPAIR HERNIA INCISIONAL;  Surgeon: Aldo Darling MD;  [facility];  Service: General     OB History          6    Para   3  "   Term   3       0    AB   3    Living   3         SAB   3    IAB   0    Ectopic   0    Multiple   0    Live Births   3               Social History     Tobacco Use    Smoking status: Never    Smokeless tobacco: Never   Vaping Use    Vaping status: Never Used   Substance Use Topics    Alcohol use: Yes     Comment: couple times/month    Drug use: Never     Social History     Substance and Sexual Activity   Sexual Activity Yes    Partners: Male    Birth control/protection: OCP     Cancer-related family history includes Cancer (age of onset: 50) in her mother. There is no history of Breast cancer, Colon cancer, or Ovarian cancer.    Current Outpatient Medications   Medication Instructions    desogestrel-ethinyl estradiol (Apri) 0.15-30 MG-MCG per tablet TAKE 1 TABLET BY MOUTH EVERY DAY       Review of Systems:  Review of Systems   Constitutional: Negative.    Gastrointestinal: Negative.    Genitourinary:  Negative for difficulty urinating, menstrual problem, pelvic pain and vaginal discharge.   Skin: Negative.        Physical Exam:  /84 (BP Location: Right arm, Patient Position: Sitting, Cuff Size: Large)   Pulse 91   Ht 5' 3\" (1.6 m)   Wt 91.3 kg (201 lb 3.2 oz)   BMI 35.64 kg/m²   Physical Exam  Constitutional:       General: She is not in acute distress.     Appearance: She is well-developed.   Genitourinary:      Vulva normal.      No lesions in the vagina.        Right Adnexa: not tender and no mass present.     Left Adnexa: not tender and no mass present.     No cervical motion tenderness or lesion.      Uterus is not tender.   Breasts:     Right: No mass, nipple discharge, skin change or tenderness.      Left: No mass, nipple discharge, skin change or tenderness.   Neck:      Thyroid: No thyromegaly.   Cardiovascular:      Rate and Rhythm: Normal rate and regular rhythm.   Pulmonary:      Effort: Pulmonary effort is normal.   Abdominal:      Palpations: Abdomen is soft.      Tenderness: There " is no abdominal tenderness.   Musculoskeletal:      Cervical back: Neck supple.   Neurological:      Mental Status: She is alert and oriented to person, place, and time.   Skin:     General: Skin is warm and dry.   Vitals reviewed.       Assessment/Plan:   1. Normal well-woman GYN exam.  2. Cervical cancer screening:  Normal cervical exam.  Pap smear not indicated at this time.  Has not received HPV vaccine in the past, but she desires to initiate vaccine series now.  Given HPV vaccine today and she will return in 2 and 6 months for subsequent vaccinations.     3. STD screening:  Patient declines but is agreeable to once/lifetime HIV screening.   4. Breast cancer screening:  Normal breast exam.  Family history of half-sister with breast CA diagnosed last year.  Referral ordered for consultation with [facility].  Reviewed breast self-awareness.   5. Depression Screening: Patient's depression screening was assessed with a PHQ-2 score of 0. Their PHQ-9 score was 0. Clinically patient does not have depression. No treatment is required.   6. BMI Counseling: Body mass index is 35.64 kg/m². Discussed the patient's BMI with her. The BMI is above normal. Nutrition recommendations include reducing portion sizes and decreasing overall calorie intake.   7. Contraception:  OCP's.  Rx refills sent to pharmacy for another year.   8. Return to office in 1 year for annual GYN exam.    Reviewed with patient that test results are available [facility] immediately, but that they will not necessarily be reviewed by me immediately.  Explained that I will review results at my earliest opportunity and contact patient appropriately.

## 2024-07-25 NOTE — PATIENT INSTRUCTIONS
Thank you for your confidence in our team.   We appreciate you and welcome your feedback.   If you receive a survey from us, please take a few moments to let us know how we are doing.   Sincerely,  ARISTIDES Smith       OBESITY     Obesity is defined as a body mass index (BMI) which is greater than 30. Your Body mass index is 35.64 kg/m²..    The risks of obesity include  many health problems, such as injuries or physical disability. You may need tests to check for the following:  Diabetes     High blood pressure or high cholesterol     Heart disease     Gallbladder or liver disease     Cancer of the colon, breast, prostate, liver, or kidney     Sleep apnea     Arthritis or gout    Seek care immediately if:   You have a severe headache, confusion, or difficulty speaking.     You have weakness on one side of your body.     You have chest pain, sweating, or shortness of breath.    Contact your healthcare provider if:   You have symptoms of gallbladder or liver disease, such as pain in your upper abdomen.    You have knee or hip pain and discomfort while walking.     You have symptoms of diabetes, such as intense hunger and thirst, and frequent urination.     You have symptoms of sleep apnea, such as snoring or daytime sleepiness.     You have questions or concerns about your condition or care.    Treatment for obesity  focuses on helping you lose weight to improve your health. Even a small decrease in BMI can reduce the risk for many health problems. Your healthcare provider will help you set a weight-loss goal.  Lifestyle changes  are the first step in treating obesity. These include making healthy food choices and getting regular physical activity. Your healthcare provider may suggest a weight-loss program that involves coaching, education, and therapy.     Medicine  may help you lose weight when it is used with a healthy diet and physical activity.     Surgery  can help you lose weight if you are very  obese and have other health problems. There are several types of weight-loss surgery. Ask your healthcare provider for more information.    Be successful losing weight:   Set small, realistic goals.  An example of a small goal is to walk for 20 minutes 5 days a week. Anther goal is to lose 5% of your body weight.    Tell friends, family members, and coworkers about your goals  and ask for their support. Ask a friend to lose weight with you, or join a weight-loss support group.    Identify foods or triggers that may cause you to overeat , and find ways to avoid them. Remove tempting high-calorie foods from your home and workplace. Place a bowl of fresh fruit on your kitchen counter. If stress causes you to eat, then find other ways to cope with stress.     Keep a diary to track what you eat and drink.  Also write down how many minutes of physical activity you do each day. Weigh yourself once a week and record it in your diary.    Eating changes:  You will need to eat 500 to 1,000 fewer calories each day than you currently eat to lose 1 to 2 pounds a week. The following changes will help you cut calories:  Eat smaller portions.  Use small plates, no larger than 9 inches in diameter. Fill your plate half full of fruits and vegetables. Measure your food using measuring cups until you know what a serving size looks like.     Eat 3 meals and 1 or 2 snacks each day.  Plan your meals in advance. Cook and eat at home most of the time. Eat slowly.     Eat fruits and vegetables at every meal.  They are low in calories and high in fiber, which makes you feel full. Do not add butter, margarine, or cream sauce to vegetables. Use herbs to season steamed vegetables.     Eat less fat and fewer fried foods.  Eat more baked or grilled chicken and fish. These protein sources are lower in calories and fat than red meat. Limit fast food. Dress your salads with olive oil and vinegar instead of bottled dressing.     Limit the amount of  sugar you eat.  Do not drink sugary beverages. Limit alcohol.    Activity changes:  Physical activity is good for your body in many ways. It helps you burn calories and build strong muscles. It decreases stress and depression, and improves your mood. It can also help you sleep better. Talk to your healthcare provider before you begin an exercise program.  Exercise for at least 30 minutes 5 days a week.  Start slowly. Set aside time each day for physical activity that you enjoy and that is convenient for you. It is best to do both weight training and an activity that increases your heart rate, such as walking, bicycling, or swimming.     Find ways to be more active.  Do yard work and housecleaning. Walk up the stairs instead of using elevators. Spend your leisure time going to events that require walking, such as outdoor festivals or fairs. This extra physical activity can help you lose weight and keep it off.    Follow up with your primary healthcare provider as directed.  You may need to meet with a dietitian. Write down your questions so you remember to ask them during your visits.

## 2024-09-26 ENCOUNTER — TELEPHONE (OUTPATIENT)
Dept: OBGYN CLINIC | Facility: CLINIC | Age: 34
End: 2024-09-26

## 2024-10-15 ENCOUNTER — HOSPITAL ENCOUNTER (EMERGENCY)
Facility: HOSPITAL | Age: 34
Discharge: HOME | End: 2024-10-15
Attending: EMERGENCY MEDICINE
Payer: MEDICARE

## 2024-10-15 VITALS
OXYGEN SATURATION: 100 % | TEMPERATURE: 98.4 F | SYSTOLIC BLOOD PRESSURE: 169 MMHG | DIASTOLIC BLOOD PRESSURE: 92 MMHG | HEART RATE: 99 BPM | BODY MASS INDEX: 36.28 KG/M2 | WEIGHT: 204.81 LBS | RESPIRATION RATE: 18 BRPM

## 2024-10-15 DIAGNOSIS — L72.9 INFECTED CYST OF SKIN: Primary | ICD-10-CM

## 2024-10-15 DIAGNOSIS — L08.9 INFECTED CYST OF SKIN: Primary | ICD-10-CM

## 2024-10-15 LAB
EXT PREGNANCY TEST URINE: NEGATIVE
EXT. CONTROL: NORMAL

## 2024-10-15 PROCEDURE — 81025 URINE PREGNANCY TEST: CPT | Performed by: PHYSICIAN ASSISTANT

## 2024-10-15 PROCEDURE — 99284 EMERGENCY DEPT VISIT MOD MDM: CPT | Performed by: PHYSICIAN ASSISTANT

## 2024-10-15 PROCEDURE — 99282 EMERGENCY DEPT VISIT SF MDM: CPT

## 2024-10-15 PROCEDURE — 96372 THER/PROPH/DIAG INJ SC/IM: CPT

## 2024-10-15 RX ORDER — CEPHALEXIN 500 MG/1
500 CAPSULE ORAL 4 TIMES DAILY
Qty: 40 CAPSULE | Refills: 0 | Status: SHIPPED | OUTPATIENT
Start: 2024-10-15 | End: 2024-10-25

## 2024-10-15 RX ORDER — IBUPROFEN 600 MG/1
600 TABLET, FILM COATED ORAL EVERY 6 HOURS PRN
Qty: 30 TABLET | Refills: 0 | Status: SHIPPED | OUTPATIENT
Start: 2024-10-15 | End: 2024-10-25

## 2024-10-15 RX ORDER — KETOROLAC TROMETHAMINE 30 MG/ML
15 INJECTION, SOLUTION INTRAMUSCULAR; INTRAVENOUS ONCE
Status: COMPLETED | OUTPATIENT
Start: 2024-10-15 | End: 2024-10-15

## 2024-10-15 RX ORDER — SULFAMETHOXAZOLE AND TRIMETHOPRIM 800; 160 MG/1; MG/1
1 TABLET ORAL 2 TIMES DAILY
Qty: 14 TABLET | Refills: 0 | Status: SHIPPED | OUTPATIENT
Start: 2024-10-15 | End: 2024-10-22

## 2024-10-15 RX ADMIN — KETOROLAC TROMETHAMINE 15 MG: 30 INJECTION, SOLUTION INTRAMUSCULAR; INTRAVENOUS at 14:51

## 2024-10-15 NOTE — Clinical Note
Marian Stacy was seen and treated in our emergency department on 10/15/2024.                Diagnosis:     Marian  .    She may return on this date: 10/17/2024         If you have any questions or concerns, please don't hesitate to call.      Maureen Linda PA-C    ______________________________           _______________          _______________  Hospital Representative                              Date                                Time

## 2024-10-15 NOTE — Clinical Note
Marian Stacy was seen and treated in our emergency department on 10/15/2024.                Diagnosis:     Marian  .    She may return on this date: 10/16/2024         If you have any questions or concerns, please don't hesitate to call.      Maureen Linda PA-C    ______________________________           _______________          _______________  Hospital Representative                              Date                                Time

## 2024-10-15 NOTE — ED PROVIDER NOTES
Time reflects when diagnosis was documented in both MDM as applicable and the Disposition within this note       Time User Action Codes Description Comment    10/15/2024  3:05 PM Maureen Linda Add [L72.9,  L08.9] Infected cyst of skin     10/15/2024  3:05 PM Maureen Linda Modify [L72.9,  L08.9] Infected cyst of skin neck           ED Disposition       ED Disposition   Discharge    Condition   Stable    Date/Time   Tue Oct 15, 2024  3:04 PM    Comment   Marian LISSY Stacy discharge to home/self care.                   Assessment & Plan       Medical Decision Making  Based on patient's history she has had an epidermal cyst for some time that now appears to be getting infected.  Discussed option of incision and drainage/needle aspiration however the area appears small will treat conservatively with oral antibiotics warm compresses and have follow-up with ENT    Amount and/or Complexity of Data Reviewed  Labs: ordered.    Risk  Prescription drug management.             Medications   ketorolac (TORADOL) injection 15 mg (15 mg Intramuscular Given 10/15/24 1451)       ED Risk Strat Scores                           SBIRT 20yo+      Flowsheet Row Most Recent Value   Initial Alcohol Screen: US AUDIT-C     1. How often do you have a drink containing alcohol? 0 Filed at: 10/15/2024 1404   2. How many drinks containing alcohol do you have on a typical day you are drinking?  0 Filed at: 10/15/2024 1404   3b. FEMALE Any Age, or MALE 65+: How often do you have 4 or more drinks on one occassion? 0 Filed at: 10/15/2024 1404   Audit-C Score 0 Filed at: 10/15/2024 1404   VILMA: How many times in the past year have you...    Used an illegal drug or used a prescription medication for non-medical reasons? Never Filed at: 10/15/2024 1404                            History of Present Illness       Chief Complaint   Patient presents with    Abscess     Lump present on the L side of the pt's neck that she states has been for years and  sometimes she squeezed it and pus came out but over the past three days, grown in size and painful       Past Medical History:   Diagnosis Date    Endometriosis       Past Surgical History:   Procedure Laterality Date    CYSTOSCOPY N/A 12/7/2020    Procedure: CYSTOSCOPY;  Surgeon: Armond Stone DO;  [facility];  Service: Gynecology    OOPHORECTOMY Right 12/7/2020    Procedure: OOPHORECTOMY, LAPAROSCOPIC;  Surgeon: Armond Stone DO;  [facility];  Service: Gynecology    PELVIC LAPAROSCOPY Bilateral 12/7/2020    Procedure: CYSTECTOMY OVARIAN, LAPAROSCOPIC;  Surgeon: Armond Stone DO;  [facility];  Service: Gynecology    OH EXC B9 LESION MRGN XCP SK TG T/A/L >4.0 CM N/A 12/6/2023    Procedure: EXCISION  BIOPSY LESION/MASS ABDOMINAL/CHEST WALL - umbilical area;  Surgeon: Aldo Darling MD;  [facility];  Service: General    OH LAPAROSCOPY W/RMVL ADNEXAL STRUCTURES Right 12/7/2020    Procedure: SALPINGECTOMY, LAPAROSCOPIC;  Surgeon: Armond Stone DO;  [facility];  Service: Gynecology    OH LAPS ABD PRTM&OMENTUM DX W/WO SPEC BR/WA SPX N/A 12/7/2020    Procedure: LAPAROSCOPY DIAGNOSTIC;  Surgeon: Armond Stone DO;  [facility];  Service: Gynecology    OH RPR AA HERNIA 1ST < 3 CM REDUCIBLE N/A 2/8/2023    Procedure: REPAIR HERNIA INCISIONAL;  Surgeon: Aldo Darling MD;  [facility];  Service: General    OH RPR AA HERNIA 1ST < 3 CM REDUCIBLE N/A 12/6/2023    Procedure: REPAIR HERNIA INCISIONAL;  Surgeon: Aldo Darling MD;  [facility];  Service: General      Family History   Problem Relation Age of Onset    Cancer Mother 50        brain    Cancer Father         lung    Breast cancer Sister         40's    Colon cancer Neg Hx     Ovarian cancer Neg Hx       Social History     Tobacco Use    Smoking status: Never    Smokeless tobacco: Never   Vaping Use    Vaping status: Never Used   Substance Use Topics     Alcohol use: Yes     Comment: couple times/month    Drug use: Never      E-Cigarette/Vaping    E-Cigarette Use Never User       E-Cigarette/Vaping Substances    Nicotine No     THC No     CBD No     Flavoring No     Other No     Unknown No       I have reviewed and agree with the history as documented.     Lump to neck for years - occ gets stuff out of it - recently over past 3 days getting larger- no drainage now. Getting larger and more sore. No fevers or chills.         Review of Systems   Constitutional:  Negative for fever.   HENT:  Negative for congestion, dental problem, ear pain and sore throat.         Lump on neck   Respiratory: Negative.     Cardiovascular: Negative.    Gastrointestinal: Negative.    All other systems reviewed and are negative.          Objective       ED Triage Vitals   Temperature Pulse Blood Pressure Respirations SpO2 Patient Position - Orthostatic VS   10/15/24 1356 10/15/24 1356 10/15/24 1357 10/15/24 1357 10/15/24 1356 10/15/24 1356   98.4 °F (36.9 °C) 99 169/92 18 100 % Sitting      Temp src Heart Rate Source BP Location FiO2 (%) Pain Score    -- 10/15/24 1356 10/15/24 1356 -- 10/15/24 1357     Monitor Right arm  10 - Worst Possible Pain      Vitals      Date and Time Temp Pulse SpO2 Resp BP Pain Score FACES Pain Rating User   10/15/24 1451 -- -- -- -- -- 10 - Worst Possible Pain -- SG   10/15/24 1357 -- -- -- 18 169/92 10 - Worst Possible Pain -- AW   10/15/24 1356 98.4 °F (36.9 °C) 99 100 % -- -- -- -- AW            Physical Exam  Vitals and nursing note reviewed.   Constitutional:       Appearance: She is well-developed.   HENT:      Head: Normocephalic and atraumatic.      Right Ear: External ear normal.      Left Ear: External ear normal.   Eyes:      Conjunctiva/sclera: Conjunctivae normal.   Neck:     Cardiovascular:      Rate and Rhythm: Normal rate and regular rhythm.      Heart sounds: Normal heart sounds.   Pulmonary:      Effort: Pulmonary effort is normal.      Breath  sounds: Normal breath sounds.   Abdominal:      General: Bowel sounds are normal.      Palpations: Abdomen is soft.   Musculoskeletal:         General: Normal range of motion.      Cervical back: Neck supple.   Lymphadenopathy:      Cervical: No cervical adenopathy.   Skin:     General: Skin is warm.      Findings: No rash.   Neurological:      Mental Status: She is alert.   Psychiatric:         Behavior: Behavior normal.         Results Reviewed       Procedure Component Value Units Date/Time    POCT pregnancy, urine [[id number]]  (Normal) Resulted: 10/15/24 1449    Lab Status: Final result Updated: 10/15/24 1449     EXT Preg Test, Ur Negative     Control Valid            No orders to display       Procedures    ED Medication and Procedure Management   Prior to Admission Medications   Prescriptions Last Dose Informant Patient Reported? Taking?   desogestrel-ethinyl estradiol (Apri) 0.15-30 MG-MCG per tablet   No No   Sig: Take 1 tablet by mouth daily      Facility-Administered Medications: None     Discharge Medication List as of 10/15/2024  3:06 PM        START taking these medications    Details   cephalexin (KEFLEX) 500 mg capsule Take 1 capsule (500 mg total) by mouth 4 (four) times a day for 10 days, Starting Tue 10/15/2024, Until Fri 10/25/2024, Normal      ibuprofen (MOTRIN) 600 mg tablet Take 1 tablet (600 mg total) by mouth every 6 (six) hours as needed for mild pain for up to 10 days, Starting Tue 10/15/2024, Until Fri 10/25/2024 at 2359, Normal      sulfamethoxazole-trimethoprim (BACTRIM DS) 800-160 mg per tablet Take 1 tablet by mouth 2 (two) times a day for 7 days, Starting Tue 10/15/2024, Until Tue 10/22/2024, Normal           CONTINUE these medications which have NOT CHANGED    Details   desogestrel-ethinyl estradiol (Apri) 0.15-30 MG-MCG per tablet Take 1 tablet by mouth daily, Starting Thu 7/25/2024, Normal             ED SEPSIS DOCUMENTATION   Time reflects when diagnosis was documented in both MDM  as applicable and the Disposition within this note       Time User Action Codes Description Comment    10/15/2024  3:05 PM Maureen Linda Add [L72.9,  L08.9] Infected cyst of skin     10/15/2024  3:05 PM Maureen Linda Modify [L72.9,  L08.9] Infected cyst of skin neck                  Maureen Linda PA-C  10/15/24 1512       Maureen Linda PA-C  10/15/24 1885

## 2024-10-15 NOTE — DISCHARGE INSTRUCTIONS
Warm compresses to area. Medication as directed. Follow up with surgery/ENT for eval/ possible removal of cyst

## 2024-10-22 NOTE — PROGRESS NOTES
Assessment/Plan:   Marian Stacy is a 34 y.o.female who is here for   Chief Complaint   Patient presents with    Cyst     Patient is here today regarding a cyst on the left side of her neck was seen in the ER 10/15/24 and placed on antibiotics after 1 week it opened up and started to drain. This is the first cyst patient has had no infections in the past such as MRSA,          On exam found to have Sebaceous Cyst of the : left neck.    Plan: Excise lesion(s) in the office under local anesthetic in three months. Patient knows Dr. Kaufman will evaluate and perform cystectomy in office if he deems this reasonable. If not he will schedule for the OR at this next visit in three months.             _______________________________________________________  CC:Cyst (Patient is here today regarding a cyst on the left side of her neck was seen in the ER 10/15/24 and placed on antibiotics after 1 week it opened up and started to drain. This is the first cyst patient has had no infections in the past such as MRSA, )  .    HPI:  Marian Stacy is a 34 y.o.female who was referred for evaluation of Cyst (Patient is here today regarding a cyst on the left side of her neck was seen in the ER 10/15/24 and placed on antibiotics after 1 week it opened up and started to drain. This is the first cyst patient has had no infections in the past such as MRSA, )  .    Currently patient reports went to ER 10/15/24 for abscess on left neck. Patient reports cyst there for many years. Was treated with keflex and Bactrim. Patient states the cyst opened and drained a week ago. Patient reports she has always had a cyst on left neck but this is her first infection and drainage. Patient would like definitive removal.     ROS:  General ROS: negative  negative for - chills, fatigue, fever or night sweats, weight loss  Respiratory ROS: no cough, shortness of breath, or wheezing  Cardiovascular ROS: no chest pain or dyspnea on  exertion  Genito-Urinary ROS: no dysuria, trouble voiding, or hematuria  Musculoskeletal ROS: negative for - gait disturbance, joint pain or muscle pain  Neurological ROS: no TIA or stroke symptoms  Skin ROS: See HPI  GI ROS: see HPI  Skin ROS: no new rashes or lesions   Lymphatic ROS: no new adenopathy noted by pt.   Psy ROS: no new mental or behavioral disturbances       Patient Active Problem List   Diagnosis    Family history of breast cancer in first degree relative         Allergies:  Wound dressing adhesive      Current Outpatient Medications:     desogestrel-ethinyl estradiol (Apri) 0.15-30 MG-MCG per tablet, Take 1 tablet by mouth daily, Disp: 84 tablet, Rfl: 4    ibuprofen (MOTRIN) 600 mg tablet, Take 1 tablet (600 mg total) by mouth every 6 (six) hours as needed for mild pain for up to 10 days, Disp: 30 tablet, Rfl: 0    Past Medical History:   Diagnosis Date    Endometriosis        Past Surgical History:   Procedure Laterality Date    CYSTOSCOPY N/A 12/7/2020    Procedure: CYSTOSCOPY;  Surgeon: Armond Stone DO;  [facility];  Service: Gynecology    OOPHORECTOMY Right 12/7/2020    Procedure: OOPHORECTOMY, LAPAROSCOPIC;  Surgeon: Armond Stone DO;  [facility];  Service: Gynecology    PELVIC LAPAROSCOPY Bilateral 12/7/2020    Procedure: CYSTECTOMY OVARIAN, LAPAROSCOPIC;  Surgeon: Armond Stone DO;  [facility];  Service: Gynecology    NV EXC B9 LESION MRGN XCP SK TG T/A/L >4.0 CM N/A 12/6/2023    Procedure: EXCISION  BIOPSY LESION/MASS ABDOMINAL/CHEST WALL - umbilical area;  Surgeon: Aldo Darling MD;  [facility];  Service: General    NV LAPAROSCOPY W/RMVL ADNEXAL STRUCTURES Right 12/7/2020    Procedure: SALPINGECTOMY, LAPAROSCOPIC;  Surgeon: Armond Stone DO;  [facility];  Service: Gynecology    NV LAPS ABD PRTM&OMENTUM DX W/WO SPEC BR/WA SPX N/A 12/7/2020    Procedure: LAPAROSCOPY DIAGNOSTIC;  Surgeon: Armond Stone DO;   [facility];  Service: Gynecology    NC RPR AA HERNIA 1ST < 3 CM REDUCIBLE N/A 2/8/2023    Procedure: REPAIR HERNIA INCISIONAL;  Surgeon: Aldo Darling MD;  [facility];  Service: General    NC RPR AA HERNIA 1ST < 3 CM REDUCIBLE N/A 12/6/2023    Procedure: REPAIR HERNIA INCISIONAL;  Surgeon: Aldo Darling MD;  [facility];  Service: General       Family History   Problem Relation Age of Onset    Cancer Mother 50        brain    Cancer Father         lung    Breast cancer Sister         40's    Colon cancer Neg Hx     Ovarian cancer Neg Hx         reports that she has never smoked. She has never used smokeless tobacco. She reports current alcohol use. She reports that she does not use drugs.    Vitals:    10/31/24 0923   BP: 142/88   Pulse: 99   Resp: 16   Temp: 98.3 °F (36.8 °C)   SpO2: 99%        PHYSICAL EXAM  General Appearance:    Alert, cooperative, no distress,    Head:    Normocephalic without obvious abnormality   Eyes:    PERRL, conjunctiva/corneas clear     Neck:   Supple, no adenopathy, no JVD   Back:      Lungs:      Heart:     Abdomen:     Benign, no rebound or guarding.    Extremities:   Extremities normal. No clubbing, cyanosis or edema   Psych:   Normal Affect, AOx3.    Neurologic:  Skin:   CNII-XII intact. Strength symmetric, speech intact    Warm, dry, intact, no visible rashes or lesions except as follows: small pea sized healing sebaceous cyst. No drainage. No fluctuation.                  Yamilex Erazo PA-C    Date: 10/31/2024 Time: 9:28 AM

## 2024-10-31 ENCOUNTER — OFFICE VISIT (OUTPATIENT)
Dept: SURGERY | Facility: CLINIC | Age: 34
End: 2024-10-31
Payer: MEDICARE

## 2024-10-31 VITALS
TEMPERATURE: 98.3 F | BODY MASS INDEX: 36.11 KG/M2 | HEART RATE: 99 BPM | WEIGHT: 203.8 LBS | SYSTOLIC BLOOD PRESSURE: 142 MMHG | OXYGEN SATURATION: 99 % | DIASTOLIC BLOOD PRESSURE: 88 MMHG | RESPIRATION RATE: 16 BRPM | HEIGHT: 63 IN

## 2024-10-31 DIAGNOSIS — L72.3 SEBACEOUS CYST: Primary | ICD-10-CM

## 2024-10-31 PROCEDURE — 99242 OFF/OP CONSLTJ NEW/EST SF 20: CPT | Performed by: PHYSICIAN ASSISTANT

## 2025-01-24 ENCOUNTER — OFFICE VISIT (OUTPATIENT)
Dept: GENETICS | Facility: CLINIC | Age: 35
End: 2025-01-24

## 2025-01-24 DIAGNOSIS — Z80.8 FAMILY HISTORY OF BRAIN CANCER: ICD-10-CM

## 2025-01-24 DIAGNOSIS — Z80.49 FAMILY HISTORY OF UTERINE CANCER: ICD-10-CM

## 2025-01-24 DIAGNOSIS — Z80.3 FAMILY HISTORY OF BREAST CANCER: Primary | ICD-10-CM

## 2025-01-24 NOTE — PROGRESS NOTES
Pre-Test Genetic Counseling Consult Note    Patient Name: Marian Stacy   /Age: 1990/34 y.o.  Referring Provider: ARISTIDES Smith    Date of Service: 2025  Genetic Counselor: Dara Miranda MS, [facility]  Interpretation Services: None  Location: Telephone consult   Length of Visit: 60 Minutes    Marian was referred to the [facility] Cancer Risk and Genetic Assessment Program due to her family history of breast, brain and uterine cancer . she presents today to discuss the possibility of a hereditary cancer syndrome, options for genetic testing, and implications for her and her family.      Cancer History and Treatment:     Personal History: No personal history of cancer      Screening Hx:     Breast:  Breast Imaging: Annual CBE, no current imaging   Breast Biopsy: No prior biopsy   Breast Density: Unknown    Colon:  Colonoscopy: No prior colonoscopy     Gynecologic:  Right ovary  and fallopian tube removed, uterus and left ovary intact   Endometriosis, uterine fibroids and cysts     Skin:  No current screening    Reproductive History  Age at menarche: 10  Age at first live birth:  17  Menopause: Premenopausal  Hormone replacement: N/A     Medical and Surgical History  Pertinent surgical history:   Past Surgical History:   Procedure Laterality Date    CYSTOSCOPY N/A 2020    Procedure: CYSTOSCOPY;  Surgeon: Armond Stone DO;  [facility];  Service: Gynecology    OOPHORECTOMY Right 2020    Procedure: OOPHORECTOMY, LAPAROSCOPIC;  Surgeon: Armond Stone DO;  [facility];  Service: Gynecology    PELVIC LAPAROSCOPY Bilateral 2020    Procedure: CYSTECTOMY OVARIAN, LAPAROSCOPIC;  Surgeon: Armond Stone DO;  [facility];  Service: Gynecology    WV EXC B9 LESION MRGN XCP SK TG T/A/L >4.0 CM N/A 2023    Procedure: EXCISION  BIOPSY LESION/MASS ABDOMINAL/CHEST WALL - umbilical area;  Surgeon: Aldo Darling MD;  [facility];   "Service: General    ME LAPAROSCOPY W/RMVL ADNEXAL STRUCTURES Right 12/7/2020    Procedure: SALPINGECTOMY, LAPAROSCOPIC;  Surgeon: Armond Stone DO;  [facility];  Service: Gynecology    ME LAPS ABD PRTM&OMENTUM DX W/WO SPEC BR/WA SPX N/A 12/7/2020    Procedure: LAPAROSCOPY DIAGNOSTIC;  Surgeon: Armond Stone DO;  [facility];  Service: Gynecology    ME RPR AA HERNIA 1ST < 3 CM REDUCIBLE N/A 2/8/2023    Procedure: REPAIR HERNIA INCISIONAL;  Surgeon: Aldo Darling MD;  [facility];  Service: General    ME RPR AA HERNIA 1ST < 3 CM REDUCIBLE N/A 12/6/2023    Procedure: REPAIR HERNIA INCISIONAL;  Surgeon: Aldo Darling MD;  [facility];  Service: General      Pertinent medical history:  Past Medical History:   Diagnosis Date    Endometriosis        Other History:  Height:   Ht Readings from Last 1 Encounters:   10/31/24 5' 3\" (1.6 m)     Weight:   Wt Readings from Last 1 Encounters:   10/31/24 92.4 kg (203 lb 12.8 oz)     Relevant Family History   Patient reports no Ashkenazi Anabaptism ancestry.     Maternal Family History:  Mother (d age 50) from a glioblastoma  Maternal Grandmother (d age 70's) with uterine cancer under the age of 50    Paternal Family History:  Father (d age 60's) from lung cancer and he had a history of smoking  Half-Sister (age 45) with breast cancer at the age of 43; she may have had some genetic testing and was negative but records were not available for review at the time of our appointment     Please refer to the scanned pedigree in the Media Tab for a complete family history     *All history is reported as provided by the patient; records are not available for review, except where indicated.     Assessment:  We discussed sporadic, familial and hereditary cancer.  We also discussed the many factors that influence our risk for cancer such as age, environmental exposures, lifestyle choices and family history.      We reviewed the indications " suggestive of a hereditary predisposition to cancer.    Of note, While testing an affected family member is most informative, it is also appropriate to test unaffected family members who meet testing criteria (NCCN Guidelines for Genetic/Familial High-Risk Assessment: Breast, Ovarian, and Pancreatic).      Genetic testing is indicated for Marian based on the following criteria:     Meets NCCN V2.2025 Testing Criteria for High-Penetrance Breast Cancer Susceptibility Genes: Family history of a paternal half-sister (SDR) with breast cancer under the age of 50    Meets NCCN V3.2024 Testing Criteria for the Evaluation of Samnao syndrome: >=2 first-degree or second-degree relatives with LS-related cancers including >=1 diagnosed <50 y.  Specifically, her mother (FDR) had a glioblastoma and her maternal grandmother (SDR) had uterine cancer under the age of 50    The risks, benefits, and limitations of genetic testing were reviewed with the patient, as well as genetic discrimination laws, and possible test results (positive, negative, variants of uncertain significance) and their clinical implications. If positive for a mutation, options for managing cancer risk including increased surveillance, chemoprevention, and in some cases prophylactic surgery were discussed. Marian was informed that if a hereditary cancer syndrome was identified in her, first degree relatives (parents, siblings, and children) have a chance of also inheriting the condition. Genetic testing would allow for predictive genetic testing in other relatives, who may also be at risk depending on their degree of relation.     Billing:  Based on Marian's primary insurance being Medicaid and [employer]'s billing policy, she should expect an out of pocket cost of $0. Marian verbalized understanding.    Plan: Patient decided to proceed with testing and provided consent.    Summary:     Sample Collection:  An order was placed and the patient was instructed  to go to a [facility]'s lab for a blood collection    Genetic Testing Preformed: CustomNext: Cancer® +RNAinsight® (59 genes): APC, YAMILET, AXIN2, BAP1, BARD1, BMPR1A, BRCA1, BRCA2, BRIP1, CDH1, CDK4, CDKN1B, CDKN2A, CHEK2, CTNNA1, DICER1, EGLN1, EPCAM, FH, FLCN, GREM1, HOXB13, KIF1B, KIT, MAX, MEN1, MET, MITF, MLH1, MSH2, MSH3, MSH6, MUTYH, NF1, NTHL1, PALB2, PDGFRA PMS2, POLD1, POLE, POT1, PTEN, RAD51C, RAD51D, RB1, RET, SDHA, SDHAF2, SDHB, SDHC, SDHD, SMAD4, SMARCA4, STK11, ZYAF174, TP53, TSC1, TSC2, VHL      Result Call Information:  In the event that we need to reach Marian via telephone:  I confirmed the patient's mobile number on file as the best number to call with results  I confirmed with the patient that we can leave a voicemail on the provided numbers    Results take approximately 2-3 weeks to complete once test is started.    Marian will be notified via [employer] once results are available.      Additional recommendations for surveillance/medical management will be made pending genetic test results.

## 2025-01-28 ENCOUNTER — PROCEDURE VISIT (OUTPATIENT)
Dept: SURGERY | Facility: CLINIC | Age: 35
End: 2025-01-28

## 2025-01-28 ENCOUNTER — TELEPHONE (OUTPATIENT)
Dept: SURGERY | Facility: CLINIC | Age: 35
End: 2025-01-28

## 2025-01-28 VITALS
WEIGHT: 204 LBS | HEART RATE: 86 BPM | HEIGHT: 63 IN | SYSTOLIC BLOOD PRESSURE: 127 MMHG | DIASTOLIC BLOOD PRESSURE: 83 MMHG | BODY MASS INDEX: 36.14 KG/M2

## 2025-01-28 DIAGNOSIS — L72.0 EPIDERMAL CYST OF NECK: Primary | ICD-10-CM

## 2025-01-28 PROCEDURE — 88304 TISSUE EXAM BY PATHOLOGIST: CPT | Performed by: PATHOLOGY

## 2025-01-28 NOTE — TELEPHONE ENCOUNTER
Called and left the patient a voicemail letting her know that  has been called into surgery so we are moving her 11 am office procedure to 1:30 pm. If she is not able to make it to the new appointment time please call me back at [phone] for a new appointment date and time.

## 2025-01-28 NOTE — PROGRESS NOTES
Name: Marian Stacy      : 1990      MRN: [id number]  Encounter Provider: Brian Kaufman MD  Encounter Date: 2025   Encounter department: [facility]  :  Assessment & Plan  Epidermal cyst of neck  This suture was removed without difficulty.  Was sent to pathology.  Postop instructions were provided.  Will contact with results but follow-up as needed    Orders:    Tissue Exam        History of Present Illness   HPI  Marian Stacy is a 34 y.o. female who presents for follow-up of a draining cyst on her neck and for possible excision.  History obtained from: patient    Review of Systems  Past Medical History   Past Medical History:   Diagnosis Date    Endometriosis      Past Surgical History:   Procedure Laterality Date    CYSTOSCOPY N/A 2020    Procedure: CYSTOSCOPY;  Surgeon: Armond Stone DO;  [facility];  Service: Gynecology    OOPHORECTOMY Right 2020    Procedure: OOPHORECTOMY, LAPAROSCOPIC;  Surgeon: Armond Stone DO;  [facility];  Service: Gynecology    PELVIC LAPAROSCOPY Bilateral 2020    Procedure: CYSTECTOMY OVARIAN, LAPAROSCOPIC;  Surgeon: Armond Stone DO;  [facility];  Service: Gynecology    TN EXC B9 LESION MRGN XCP SK TG T/A/L >4.0 CM N/A 2023    Procedure: EXCISION  BIOPSY LESION/MASS ABDOMINAL/CHEST WALL - umbilical area;  Surgeon: Aldo Darling MD;  [facility];  Service: General    TN LAPAROSCOPY W/RMVL ADNEXAL STRUCTURES Right 2020    Procedure: SALPINGECTOMY, LAPAROSCOPIC;  Surgeon: Armond Stone DO;  [facility];  Service: Gynecology    TN LAPS ABD PRTM&OMENTUM DX W/WO SPEC BR/WA SPX N/A 2020    Procedure: LAPAROSCOPY DIAGNOSTIC;  Surgeon: Armond Stone DO;  [facility];  Service: Gynecology    TN RPR AA HERNIA 1ST < 3 CM REDUCIBLE N/A 2023    Procedure: REPAIR HERNIA INCISIONAL;  Surgeon: Aldo Darling MD;  [facility];  Service: General    SD RPR AA HERNIA 1ST < 3 CM REDUCIBLE N/A 12/6/2023    Procedure: REPAIR HERNIA INCISIONAL;  Surgeon: Aldo Darling MD;  [facility];  Service: General     Family History   Problem Relation Age of Onset    Cancer Mother 50        brain    Cancer Father         lung    Breast cancer Sister         40's    Colon cancer Neg Hx     Ovarian cancer Neg Hx       reports that she has never smoked. She has never used smokeless tobacco. She reports current alcohol use. She reports that she does not use drugs.  Current Outpatient Medications on File Prior to Visit   Medication Sig Dispense Refill    desogestrel-ethinyl estradiol (Apri) 0.15-30 MG-MCG per tablet Take 1 tablet by mouth daily 84 tablet 4    ibuprofen (MOTRIN) 600 mg tablet Take 1 tablet (600 mg total) by mouth every 6 (six) hours as needed for mild pain for up to 10 days 30 tablet 0     No current facility-administered medications on file prior to visit.     Allergies   Allergen Reactions    Wound Dressing Adhesive Rash      Current Outpatient Medications on File Prior to Visit   Medication Sig Dispense Refill    desogestrel-ethinyl estradiol (Apri) 0.15-30 MG-MCG per tablet Take 1 tablet by mouth daily 84 tablet 4    ibuprofen (MOTRIN) 600 mg tablet Take 1 tablet (600 mg total) by mouth every 6 (six) hours as needed for mild pain for up to 10 days 30 tablet 0     No current facility-administered medications on file prior to visit.      Social History     Tobacco Use    Smoking status: Never    Smokeless tobacco: Never   Vaping Use    Vaping status: Never Used   Substance and Sexual Activity    Alcohol use: Yes     Comment: couple times/month    Drug use: No    Sexual activity: Yes     Partners: Male     Birth control/protection: Other     Comment: The pill        Objective   /83 (BP Location: Left arm, Patient Position: Sitting, Cuff Size: Large)   Pulse 86   Ht 5' 3\" (1.6 m)   Wt 92.5 kg (204 lb)   BMI " 36.14 kg/m²      Physical Exam  Vitals and nursing note reviewed.   Constitutional:       General: She is not in acute distress.     Appearance: She is well-developed. She is not diaphoretic.   HENT:      Head: Normocephalic and atraumatic.   Eyes:      Pupils: Pupils are equal, round, and reactive to light.   Cardiovascular:      Rate and Rhythm: Normal rate and regular rhythm.   Pulmonary:      Effort: No respiratory distress.   Musculoskeletal:         General: Normal range of motion.      Cervical back: Normal range of motion and neck supple.   Skin:     General: Skin is warm and dry.      Comments: Left neck there is a 1 cm cystic structure consistent with healed drained sebaceous cyst   Neurological:      Mental Status: She is alert and oriented to person, place, and time.   Psychiatric:         Behavior: Behavior normal.     Skin excision    Date/Time: 1/28/2025 1:30 PM    Performed by: Brian Kaufman MD  Authorized by: Brian Kaufman MD  Universal Protocol:  procedure performed by consultantConsent: Verbal consent obtained.  Risks and benefits: risks, benefits and alternatives were discussed  Consent given by: patient    Procedure Details - Skin Excision:     Number of Lesions:  1  Lesion 1:     Body area:  Head/neck    Head/neck location:  Neck    Malignancy: benign lesion        Initial size (mm):  12        Final defect size (mm):  12    Closure complexity: simple       Repair Comments: The structure was excised with a 15 blade scalpel after infiltration with 1% lidocaine with epinephrine.  The central pore and the underlying cystic structure was excised and sent to pathology.  The wound was closed with 4-0 Monocryl and glue.

## 2025-01-28 NOTE — ASSESSMENT & PLAN NOTE
This suture was removed without difficulty.  Was sent to pathology.  Postop instructions were provided.  Will contact with results but follow-up as needed    Orders:    Tissue Exam

## 2025-01-31 PROCEDURE — 88304 TISSUE EXAM BY PATHOLOGIST: CPT | Performed by: PATHOLOGY

## (undated) DEVICE — BETHLEHEM UNIVERSAL MINOR GEN: Brand: CARDINAL HEALTH

## (undated) DEVICE — DECANTER: Brand: UNBRANDED

## (undated) DEVICE — PLASTIC ADHESIVE BANDAGE: Brand: CURITY

## (undated) DEVICE — CHLORAPREP HI-LITE 26ML ORANGE

## (undated) DEVICE — TISSUE RETRIEVAL SYSTEM: Brand: INZII RETRIEVAL SYSTEM

## (undated) DEVICE — TROCAR: Brand: KII SLEEVE

## (undated) DEVICE — NEEDLE HYPO 22G X 1-1/2 IN

## (undated) DEVICE — PENCIL ELECTROSURG E-Z CLEAN -0035H

## (undated) DEVICE — SYRINGE 50ML LL

## (undated) DEVICE — BULB SYRINGE,IRRIGATION WITH PROTECTIVE CAP: Brand: DOVER

## (undated) DEVICE — HEMOSTATIC MATRIX SURGIFLO 8ML W/THROMBIN

## (undated) DEVICE — ENDOPATH PNEUMONEEDLE INSUFFLATION NEEDLES WITH LUER LOCK CONNECTORS 120MM: Brand: ENDOPATH

## (undated) DEVICE — GLOVE INDICATOR PI UNDERGLOVE SZ 8 BLUE

## (undated) DEVICE — ADHESIVE SKIN HIGH VISCOSITY EXOFIN 1ML

## (undated) DEVICE — ANTIBACTERIAL UNDYED BRAIDED (POLYGLACTIN 910), SYNTHETIC ABSORBABLE SUTURE: Brand: COATED VICRYL

## (undated) DEVICE — INTENDED FOR TISSUE SEPARATION, AND OTHER PROCEDURES THAT REQUIRE A SHARP SURGICAL BLADE TO PUNCTURE OR CUT.: Brand: BARD-PARKER SAFETY BLADES SIZE 15, STERILE

## (undated) DEVICE — ELECTRODE BLADE MOD E-Z CLEAN 2.5IN 6.4CM -0012M

## (undated) DEVICE — UNDYED BRAIDED (POLYGLACTIN 910), SYNTHETIC ABSORBABLE SUTURE: Brand: COATED VICRYL

## (undated) DEVICE — TROCAR: Brand: KII FIOS FIRST ENTRY

## (undated) DEVICE — GLOVE SRG BIOGEL ECLIPSE 7

## (undated) DEVICE — LIGASURE LAP SLR/DIV MARYLAND 5MM

## (undated) DEVICE — PLUMEPEN PRO 10FT

## (undated) DEVICE — IV EXTENSION TUBING 33 IN

## (undated) DEVICE — [HIGH FLOW INSUFFLATOR,  DO NOT USE IF PACKAGE IS DAMAGED,  KEEP DRY,  KEEP AWAY FROM SUNLIGHT,  PROTECT FROM HEAT AND RADIOACTIVE SOURCES.]: Brand: PNEUMOSURE

## (undated) DEVICE — PAD GROUNDING ADULT

## (undated) DEVICE — 5 MM CURVED DISSECTORS WITH MONOPOLAR CAUTERY: Brand: ENDOPATH

## (undated) DEVICE — BLUE HEAT SCOPE WARMER

## (undated) DEVICE — INTENDED FOR TISSUE SEPARATION, AND OTHER PROCEDURES THAT REQUIRE A SHARP SURGICAL BLADE TO PUNCTURE OR CUT.: Brand: BARD-PARKER SAFETY BLADES SIZE 11, STERILE

## (undated) DEVICE — SUT MONOCRYL 4-0 PS-2 18 IN Y496G

## (undated) DEVICE — VIOLET BRAIDED (POLYGLACTIN 910), SYNTHETIC ABSORBABLE SUTURE: Brand: COATED VICRYL

## (undated) DEVICE — SCD SEQUENTIAL COMPRESSION COMFORT SLEEVE MEDIUM KNEE LENGTH: Brand: KENDALL SCD

## (undated) DEVICE — SUT MONOCRYL 4-0 PS-2 27 IN Y426H

## (undated) DEVICE — CATH FOLEY 16FR 5ML 2 WAY UNCOATED SILICONE

## (undated) DEVICE — TOWEL SET X-RAY

## (undated) DEVICE — SUT VICRYL 2-0 REEL 54 IN J286G

## (undated) DEVICE — ELECTRODE LAP J HOOK E-Z CLEAN 33CM-0021

## (undated) DEVICE — PREMIUM DRY TRAY LF: Brand: MEDLINE INDUSTRIES, INC.

## (undated) DEVICE — SUT PLAIN 3-0 PS-1 27 IN 1640H

## (undated) DEVICE — IRRIG ENDO FLO TUBING

## (undated) DEVICE — GAUZE SPONGES,16 PLY: Brand: CURITY

## (undated) DEVICE — SUT VICRYL 3-0 SH 27 IN J416H

## (undated) DEVICE — BETHLEHEM UNIVERSAL GYN LAP PK: Brand: CARDINAL HEALTH

## (undated) DEVICE — GLOVE PI ULTRA TOUCH SZ.7.5